# Patient Record
Sex: FEMALE | Race: WHITE | NOT HISPANIC OR LATINO | Employment: FULL TIME | URBAN - METROPOLITAN AREA
[De-identification: names, ages, dates, MRNs, and addresses within clinical notes are randomized per-mention and may not be internally consistent; named-entity substitution may affect disease eponyms.]

---

## 2017-02-17 ENCOUNTER — OFFICE VISIT (OUTPATIENT)
Dept: CARDIOLOGY | Facility: CLINIC | Age: 37
End: 2017-02-17

## 2017-02-17 VITALS
HEART RATE: 97 BPM | WEIGHT: 269.4 LBS | HEIGHT: 67 IN | BODY MASS INDEX: 42.28 KG/M2 | DIASTOLIC BLOOD PRESSURE: 92 MMHG | SYSTOLIC BLOOD PRESSURE: 141 MMHG

## 2017-02-17 DIAGNOSIS — R00.2 PALPITATIONS: Primary | ICD-10-CM

## 2017-02-17 PROCEDURE — 93225 XTRNL ECG REC<48 HRS REC: CPT | Performed by: INTERNAL MEDICINE

## 2017-02-17 PROCEDURE — 99213 OFFICE O/P EST LOW 20 MIN: CPT | Performed by: INTERNAL MEDICINE

## 2017-02-17 RX ORDER — ESOMEPRAZOLE MAGNESIUM 40 MG/1
40 CAPSULE, DELAYED RELEASE ORAL
COMMUNITY
End: 2019-10-16

## 2017-02-17 NOTE — PROGRESS NOTES
[facility] at [facility]  Office Progress Note  Brandy Garcia  1980  [phone]  [phone]    Visit Date: 02/17/2017    PCP: No Known Provider  [place] [id number]    IDENTIFICATION: A 36 y.o. female from [place].    Chief Complaint   Patient presents with   • Follow-up     SOA/PALPS       PROBLEM LIST:   1. Chest pain/palpitations   A. Echo 7/15: normal EF, normal pressures, IVC collapse consistent with decreased intravascular volume.  2. Obesity  3. GERD  4. H/o SVT   A. No interventions  5. Elevated TSH with history of postpartum hypothyroidism      Allergies  Allergies not on file    Current Medications    Current Outpatient Prescriptions:   •  esomeprazole (nexIUM) 40 MG capsule, Take 40 mg by mouth Every Morning Before Breakfast., Disp: , Rfl:       History of Present Illness   Patient is previously known to Dr. Jacobsen and was last seen in July 2015 while she was pregnant with chest discomfort and tachycardia palpitations.  He performed an echocardiogram which showed a collapsed inferior vena cava and he encouraged to increase fluid intake for which resolved her symptoms at that time.  Proximal by 2 months ago she began with fatigue, malaise, and recurrent panic attacks which led up to an emergency department visit to [place] [facility] where she is found to have an elevated TSH.  She was found to have sinus tachycardia with a heart rate of 129 bpm and was given IV fluids and had improvement of her symptoms at that time.  She was discharged home with recommended follow-up and further evaluation of her elevated TSH levels.    She states that over the last 2 weeks she has had a worsening sensation of tachycardia palpitations that are waking her up at night making her short of breath without dizziness or chest discomfort.  She has been able to perform routine activities and notes limitations to her activities of daily living due to fatigue.  She has discontinued  "her caffeine intake and has not seen any improvement.  She does not use any decongestants or OTC NSAIDs.  She also acknowledges uncontrolled weight fluctuations but overall has continuously gained weight over the last 6 months.  She denies orthopnea, PND, lower extremity edema.    ROS:  All systems have been reviewed and are negative with the exception of those mentioned in the HPI.    OBJECTIVE:  Vitals:    02/17/17 1250   BP: 141/92   BP Location: Left arm   Patient Position: Sitting   Pulse: 97   Weight: 269 lb 6.4 oz (122 kg)   Height: 67\" (170.2 cm)     Physical Exam   Constitutional: She is oriented to person, place, and time. She appears well-developed and well-nourished. No distress.   Neck: Normal range of motion. Neck supple. No hepatojugular reflux and no JVD present. Carotid bruit is not present. No tracheal deviation present. No thyromegaly present.   Cardiovascular: Normal rate, regular rhythm, S1 normal, S2 normal, intact distal pulses and normal pulses.  PMI is not displaced.  Exam reveals no gallop, no distant heart sounds, no friction rub, no midsystolic click and no opening snap.    No murmur heard.  Pulses:       Radial pulses are 2+ on the right side, and 2+ on the left side.        Dorsalis pedis pulses are 2+ on the right side, and 2+ on the left side.        Posterior tibial pulses are 2+ on the right side, and 2+ on the left side.   Pulmonary/Chest: Effort normal and breath sounds normal. She has no wheezes. She has no rales.   Abdominal: Soft. Bowel sounds are normal. She exhibits no mass. There is no tenderness. There is no guarding.   Musculoskeletal: Normal range of motion. She exhibits no edema or tenderness.   Neurological: She is alert and oriented to person, place, and time.   Nursing note and vitals reviewed.      Diagnostic Data:  Procedures      ASSESSMENT:   Diagnosis Plan   1. Palpitations  Holter Monitor - 24 Hour       PLAN:  1.  Normal echo in 2015.  Related to her " hyperthyroidism from recently found elevated TSH.  With her questionable history of supraventricular tachycardia will perform 24-hour Holter monitor just to ensure that this is sinus tachycardia related to her thyroid issues.  Will send a letter the results.  She has planned follow-up with a primary care provider to begin evaluation for this elevated TSH.    Follow-up with us in 9 months or sooner if needed.    Scribed for Severo Leija MD by RACHANA Rasheed. 2/17/2017  1:33 PM   I, Seveor Leija MD, personally performed the services described in this documentation as scribed by the above named individual in my presence, and it is both accurate and complete.  2/17/2017  1:43 PM        Severo Leija MD, [facility]

## 2017-02-28 PROCEDURE — 93227 XTRNL ECG REC<48 HR R&I: CPT | Performed by: INTERNAL MEDICINE

## 2017-03-02 ENCOUNTER — OFFICE VISIT (OUTPATIENT)
Dept: CARDIOLOGY | Facility: CLINIC | Age: 37
End: 2017-03-02

## 2017-03-02 DIAGNOSIS — R00.2 PALPITATIONS: ICD-10-CM

## 2017-03-08 ENCOUNTER — TELEPHONE (OUTPATIENT)
Dept: CARDIOLOGY | Facility: CLINIC | Age: 37
End: 2017-03-08

## 2017-03-08 NOTE — TELEPHONE ENCOUNTER
Patient called about holter monitor results. Informed patient her holter was normal. Patient stated she has still had an increased HR that wakes her at night. Asked patient if she had f/u with PCP for thyroid. Patient stated she had and they started her on synthroid and would recheck levels in 2 wks. Advised patient to see what her thyroid levels are are if PCP feels this isn't related to her thyroid to call us back and we can get her an appt or discuss with Dr Leija. Patient verbalized understanding.

## 2017-08-23 ENCOUNTER — TELEPHONE (OUTPATIENT)
Dept: CARDIOLOGY | Facility: CLINIC | Age: 37
End: 2017-08-23

## 2017-08-23 NOTE — TELEPHONE ENCOUNTER
Patient called stating she had an EKG with PCP and it was abnormal. Patient stated PCP told her she needs to be seen by cardio. She is currently scheduled in December for routine follow up. We have not received referral or EKG but this needs triaged. Please let us know if you would like us to move up the appointment.

## 2017-08-25 NOTE — TELEPHONE ENCOUNTER
Called patient and got PCP information.  I called [employer] [facility] at [phone] and requested the EKG at 10:50am today.  Waiting on the the fax.

## 2017-08-28 NOTE — TELEPHONE ENCOUNTER
Called again today 8/28/17 to have the EKG faxed to me at 10:20am. As of this time we had not received it.

## 2017-08-29 NOTE — TELEPHONE ENCOUNTER
Per Dr. Leija EKG looks good and to keep already scheduled appt. If she has anymore problems to give us a call.    LMOM at 2:58pm with Dr. Leija's recommendations.

## 2017-11-30 ENCOUNTER — TRANSCRIBE ORDERS (OUTPATIENT)
Dept: ADMINISTRATIVE | Facility: HOSPITAL | Age: 37
End: 2017-11-30

## 2017-12-04 ENCOUNTER — TRANSCRIBE ORDERS (OUTPATIENT)
Dept: ADMINISTRATIVE | Facility: HOSPITAL | Age: 37
End: 2017-12-04

## 2017-12-04 DIAGNOSIS — N63.11 BREAST LUMP ON RIGHT SIDE AT 10 O'CLOCK POSITION: ICD-10-CM

## 2017-12-04 DIAGNOSIS — N63.0 BREAST MASS IN FEMALE: Primary | ICD-10-CM

## 2017-12-20 ENCOUNTER — HOSPITAL ENCOUNTER (OUTPATIENT)
Dept: MAMMOGRAPHY | Facility: HOSPITAL | Age: 37
Discharge: HOME | End: 2017-12-20
Admitting: NURSE PRACTITIONER

## 2017-12-20 ENCOUNTER — HOSPITAL ENCOUNTER (OUTPATIENT)
Dept: ULTRASOUND IMAGING | Facility: HOSPITAL | Age: 37
Discharge: HOME | End: 2017-12-20

## 2017-12-20 DIAGNOSIS — N63.0 BREAST MASS IN FEMALE: ICD-10-CM

## 2017-12-20 DIAGNOSIS — N63.11 BREAST LUMP ON RIGHT SIDE AT 10 O'CLOCK POSITION: ICD-10-CM

## 2017-12-20 PROCEDURE — 76642 ULTRASOUND BREAST LIMITED: CPT | Performed by: RADIOLOGY

## 2017-12-20 PROCEDURE — G0279 TOMOSYNTHESIS, MAMMO: HCPCS

## 2017-12-20 PROCEDURE — 77066 DX MAMMO INCL CAD BI: CPT | Performed by: RADIOLOGY

## 2017-12-20 PROCEDURE — 76642 ULTRASOUND BREAST LIMITED: CPT

## 2017-12-20 PROCEDURE — 77062 BREAST TOMOSYNTHESIS BI: CPT | Performed by: RADIOLOGY

## 2017-12-20 PROCEDURE — G0204 DX MAMMO INCL CAD BI: HCPCS

## 2017-12-31 ENCOUNTER — HOSPITAL ENCOUNTER (EMERGENCY)
Facility: HOSPITAL | Age: 37
Discharge: HOME | End: 2017-12-31
Attending: EMERGENCY MEDICINE | Admitting: EMERGENCY MEDICINE

## 2017-12-31 ENCOUNTER — APPOINTMENT (OUTPATIENT)
Dept: GENERAL RADIOLOGY | Facility: HOSPITAL | Age: 37
End: 2017-12-31

## 2017-12-31 VITALS
RESPIRATION RATE: 20 BRPM | SYSTOLIC BLOOD PRESSURE: 129 MMHG | OXYGEN SATURATION: 98 % | HEART RATE: 98 BPM | WEIGHT: 247 LBS | TEMPERATURE: 99 F | BODY MASS INDEX: 38.77 KG/M2 | HEIGHT: 67 IN | DIASTOLIC BLOOD PRESSURE: 92 MMHG

## 2017-12-31 DIAGNOSIS — B34.9 VIRAL SYNDROME: Primary | ICD-10-CM

## 2017-12-31 DIAGNOSIS — J40 BRONCHITIS: ICD-10-CM

## 2017-12-31 LAB
FLUAV AG NPH QL: NEGATIVE
FLUBV AG NPH QL IA: NEGATIVE

## 2017-12-31 PROCEDURE — 94760 N-INVAS EAR/PLS OXIMETRY 1: CPT

## 2017-12-31 PROCEDURE — 99284 EMERGENCY DEPT VISIT MOD MDM: CPT

## 2017-12-31 PROCEDURE — 87804 INFLUENZA ASSAY W/OPTIC: CPT | Performed by: EMERGENCY MEDICINE

## 2017-12-31 PROCEDURE — 94640 AIRWAY INHALATION TREATMENT: CPT

## 2017-12-31 PROCEDURE — 71020 HC CHEST PA AND LATERAL: CPT

## 2017-12-31 RX ORDER — IPRATROPIUM BROMIDE AND ALBUTEROL SULFATE 2.5; .5 MG/3ML; MG/3ML
3 SOLUTION RESPIRATORY (INHALATION) ONCE
Status: COMPLETED | OUTPATIENT
Start: 2017-12-31 | End: 2017-12-31

## 2017-12-31 RX ORDER — GUAIFENESIN AND CODEINE PHOSPHATE 100; 10 MG/5ML; MG/5ML
5 SOLUTION ORAL 4 TIMES DAILY PRN
Qty: 180 ML | Refills: 0 | Status: SHIPPED | OUTPATIENT
Start: 2017-12-31 | End: 2018-01-05

## 2017-12-31 RX ADMIN — HYDROCODONE POLISTIREX AND CHLORPHENIRAMINE POLISTIREX 5 ML: 10; 8 SUSPENSION, EXTENDED RELEASE ORAL at 13:20

## 2017-12-31 RX ADMIN — IPRATROPIUM BROMIDE AND ALBUTEROL SULFATE 3 ML: .5; 3 SOLUTION RESPIRATORY (INHALATION) at 12:34

## 2018-01-01 ENCOUNTER — HOSPITAL ENCOUNTER (EMERGENCY)
Facility: HOSPITAL | Age: 38
Discharge: HOME | End: 2018-01-01
Attending: EMERGENCY MEDICINE | Admitting: EMERGENCY MEDICINE

## 2018-01-01 VITALS
BODY MASS INDEX: 38.77 KG/M2 | SYSTOLIC BLOOD PRESSURE: 116 MMHG | RESPIRATION RATE: 16 BRPM | DIASTOLIC BLOOD PRESSURE: 80 MMHG | OXYGEN SATURATION: 98 % | WEIGHT: 247 LBS | TEMPERATURE: 100 F | HEART RATE: 112 BPM | HEIGHT: 67 IN

## 2018-01-01 DIAGNOSIS — J11.1 INFLUENZA: Primary | ICD-10-CM

## 2018-01-01 DIAGNOSIS — R68.89 FLU-LIKE SYMPTOMS: ICD-10-CM

## 2018-01-01 LAB
ALBUMIN SERPL-MCNC: 4.1 G/DL (ref 3.2–4.8)
ALBUMIN/GLOB SERPL: 1.2 G/DL (ref 1.5–2.5)
ALP SERPL-CCNC: 79 U/L (ref 25–100)
ALT SERPL W P-5'-P-CCNC: 46 U/L (ref 7–40)
ANION GAP SERPL CALCULATED.3IONS-SCNC: 8 MMOL/L (ref 3–11)
AST SERPL-CCNC: 26 U/L (ref 0–33)
BASOPHILS # BLD AUTO: 0.01 10*3/MM3 (ref 0–0.2)
BASOPHILS NFR BLD AUTO: 0.2 % (ref 0–1)
BILIRUB SERPL-MCNC: 0.3 MG/DL (ref 0.3–1.2)
BUN BLD-MCNC: 10 MG/DL (ref 9–23)
BUN/CREAT SERPL: 14.3 (ref 7–25)
CALCIUM SPEC-SCNC: 8.6 MG/DL (ref 8.7–10.4)
CHLORIDE SERPL-SCNC: 106 MMOL/L (ref 99–109)
CO2 SERPL-SCNC: 22 MMOL/L (ref 20–31)
CREAT BLD-MCNC: 0.7 MG/DL (ref 0.6–1.3)
DEPRECATED RDW RBC AUTO: 42.2 FL (ref 37–54)
EOSINOPHIL # BLD AUTO: 0.11 10*3/MM3 (ref 0–0.3)
EOSINOPHIL NFR BLD AUTO: 2.2 % (ref 0–3)
ERYTHROCYTE [DISTWIDTH] IN BLOOD BY AUTOMATED COUNT: 13.4 % (ref 11.3–14.5)
GFR SERPL CREATININE-BSD FRML MDRD: 95 ML/MIN/1.73
GLOBULIN UR ELPH-MCNC: 3.4 GM/DL
GLUCOSE BLD-MCNC: 110 MG/DL (ref 70–100)
HCT VFR BLD AUTO: 37.6 % (ref 34.5–44)
HGB BLD-MCNC: 12.3 G/DL (ref 11.5–15.5)
IMM GRANULOCYTES # BLD: 0.02 10*3/MM3 (ref 0–0.03)
IMM GRANULOCYTES NFR BLD: 0.4 % (ref 0–0.6)
LYMPHOCYTES # BLD AUTO: 0.82 10*3/MM3 (ref 0.6–4.8)
LYMPHOCYTES NFR BLD AUTO: 16.4 % (ref 24–44)
MCH RBC QN AUTO: 28.1 PG (ref 27–31)
MCHC RBC AUTO-ENTMCNC: 32.7 G/DL (ref 32–36)
MCV RBC AUTO: 86 FL (ref 80–99)
MONOCYTES # BLD AUTO: 0.65 10*3/MM3 (ref 0–1)
MONOCYTES NFR BLD AUTO: 13 % (ref 0–12)
NEUTROPHILS # BLD AUTO: 3.39 10*3/MM3 (ref 1.5–8.3)
NEUTROPHILS NFR BLD AUTO: 67.8 % (ref 41–71)
PLATELET # BLD AUTO: 274 10*3/MM3 (ref 150–450)
PMV BLD AUTO: 10.4 FL (ref 6–12)
POTASSIUM BLD-SCNC: 3.8 MMOL/L (ref 3.5–5.5)
PROT SERPL-MCNC: 7.5 G/DL (ref 5.7–8.2)
RBC # BLD AUTO: 4.37 10*6/MM3 (ref 3.89–5.14)
SODIUM BLD-SCNC: 136 MMOL/L (ref 132–146)
WBC NRBC COR # BLD: 5 10*3/MM3 (ref 3.5–10.8)

## 2018-01-01 PROCEDURE — 94760 N-INVAS EAR/PLS OXIMETRY 1: CPT

## 2018-01-01 PROCEDURE — 80053 COMPREHEN METABOLIC PANEL: CPT | Performed by: PHYSICIAN ASSISTANT

## 2018-01-01 PROCEDURE — 96361 HYDRATE IV INFUSION ADD-ON: CPT

## 2018-01-01 PROCEDURE — 85025 COMPLETE CBC W/AUTO DIFF WBC: CPT | Performed by: PHYSICIAN ASSISTANT

## 2018-01-01 PROCEDURE — 25010000002 KETOROLAC TROMETHAMINE PER 15 MG: Performed by: PHYSICIAN ASSISTANT

## 2018-01-01 PROCEDURE — 25010000002 ONDANSETRON PER 1 MG: Performed by: PHYSICIAN ASSISTANT

## 2018-01-01 PROCEDURE — 94640 AIRWAY INHALATION TREATMENT: CPT

## 2018-01-01 PROCEDURE — 99284 EMERGENCY DEPT VISIT MOD MDM: CPT

## 2018-01-01 PROCEDURE — 96375 TX/PRO/DX INJ NEW DRUG ADDON: CPT

## 2018-01-01 PROCEDURE — 96374 THER/PROPH/DIAG INJ IV PUSH: CPT

## 2018-01-01 RX ORDER — SODIUM CHLORIDE 0.9 % (FLUSH) 0.9 %
10 SYRINGE (ML) INJECTION AS NEEDED
Status: DISCONTINUED | OUTPATIENT
Start: 2018-01-01 | End: 2018-01-01 | Stop reason: HOSPADM

## 2018-01-01 RX ORDER — ALBUTEROL SULFATE 2.5 MG/3ML
2.5 SOLUTION RESPIRATORY (INHALATION) ONCE
Status: COMPLETED | OUTPATIENT
Start: 2018-01-01 | End: 2018-01-01

## 2018-01-01 RX ORDER — KETOROLAC TROMETHAMINE 30 MG/ML
15 INJECTION, SOLUTION INTRAMUSCULAR; INTRAVENOUS ONCE
Status: COMPLETED | OUTPATIENT
Start: 2018-01-01 | End: 2018-01-01

## 2018-01-01 RX ORDER — ONDANSETRON 2 MG/ML
4 INJECTION INTRAMUSCULAR; INTRAVENOUS ONCE
Status: COMPLETED | OUTPATIENT
Start: 2018-01-01 | End: 2018-01-01

## 2018-01-01 RX ORDER — ALBUTEROL SULFATE 2.5 MG/3ML
SOLUTION RESPIRATORY (INHALATION)
Status: COMPLETED
Start: 2018-01-01 | End: 2018-01-01

## 2018-01-01 RX ADMIN — SODIUM CHLORIDE 2000 ML: 9 INJECTION, SOLUTION INTRAVENOUS at 14:11

## 2018-01-01 RX ADMIN — ALBUTEROL SULFATE 2.5 MG: 2.5 SOLUTION RESPIRATORY (INHALATION) at 14:30

## 2018-01-01 RX ADMIN — ALBUTEROL SULFATE 2.5 MG: 2.5 SOLUTION RESPIRATORY (INHALATION) at 14:31

## 2018-01-01 RX ADMIN — KETOROLAC TROMETHAMINE 15 MG: 30 INJECTION, SOLUTION INTRAMUSCULAR at 14:47

## 2018-01-01 RX ADMIN — ONDANSETRON 4 MG: 2 INJECTION INTRAMUSCULAR; INTRAVENOUS at 14:47

## 2018-01-01 NOTE — ED PROVIDER NOTES
Subjective   HPI Comments: 37-year-old female presents to the emergency department with flulike symptoms.  Specifically, she complains of fever, body aches, headache, nonproductive cough and generalized fatigue.  The patient states her symptoms began about 3 days ago.  She was seen here yesterday and had a negative flu screen and negative chest x-ray.  She received a neb treatment which helped briefly.  Past medical history of hypothyroidism, anxiety and depression.  She is a nonsmoker.  No alcohol or drug use.  Her PCP is Dr. Solano in [place].    Patient is a 37 y.o. female presenting with flu symptoms.   History provided by:  Patient  Flu Symptoms   Presenting symptoms: cough, fatigue, fever, headache, myalgias, nausea and sore throat    Presenting symptoms: no diarrhea, no rhinorrhea, no shortness of breath and no vomiting    Severity:  Moderate  Onset quality:  Gradual  Duration:  3 days  Progression:  Worsening  Chronicity:  New  Relieved by:  Nothing  Worsened by:  Nothing  Ineffective treatments:  OTC medications  Associated symptoms: chills, decreased appetite and nasal congestion    Associated symptoms: no ear pain        Review of Systems   Constitutional: Positive for chills, decreased appetite, fatigue and fever.   HENT: Positive for congestion and sore throat. Negative for ear pain, nosebleeds and rhinorrhea.    Eyes: Negative for pain, discharge and visual disturbance.   Respiratory: Positive for cough. Negative for shortness of breath and wheezing.    Cardiovascular: Negative for chest pain, palpitations and leg swelling.   Gastrointestinal: Positive for nausea. Negative for abdominal pain, blood in stool, diarrhea and vomiting.   Endocrine: Negative.    Genitourinary: Negative for dysuria, hematuria and urgency.   Musculoskeletal: Positive for myalgias. Negative for arthralgias and back pain.   Skin: Negative for pallor and rash.   Allergic/Immunologic: Negative for immunocompromised state.    Neurological: Positive for weakness (generalized) and headaches. Negative for dizziness and speech difficulty.   Hematological: Negative for adenopathy. Does not bruise/bleed easily.   Psychiatric/Behavioral: Negative.        History reviewed. No pertinent past medical history.    Allergies   Allergen Reactions   • Brethaire [Terbutaline]    • Prednisone Other (See Comments)   • Zithromax [Azithromycin] GI Intolerance   • Amoxicillin Rash       History reviewed. No pertinent surgical history.    History reviewed. No pertinent family history.    Social History     Social History   • Marital status:      Spouse name: N/A   • Number of children: N/A   • Years of education: N/A     Social History Main Topics   • Smoking status: Former Smoker     Quit date: 2/17/2013   • Smokeless tobacco: None   • Alcohol use 1.8 - 3.6 oz/week     1 - 2 Glasses of wine, 1 - 2 Cans of beer, 1 - 2 Shots of liquor per week      Comment: OCCAS   • Drug use: No   • Sexual activity: Defer     Other Topics Concern   • None     Social History Narrative           Objective   Physical Exam   Constitutional: She is oriented to person, place, and time. She appears well-developed and well-nourished.   Appears to feel bad     HENT:   Head: Normocephalic and atraumatic.   Nose: Nose normal.   Mouth/Throat: Oropharynx is clear and moist.   Eyes: EOM are normal. Pupils are equal, round, and reactive to light. Left eye exhibits no discharge. No scleral icterus.   Neck: Normal range of motion. Neck supple.   Cardiovascular: Regular rhythm and normal heart sounds.    No murmur heard.  Tachycardic at 114   Pulmonary/Chest: Effort normal. No respiratory distress. She has wheezes (mild). She has no rales. She exhibits no tenderness.   Abdominal: Soft. Bowel sounds are normal. There is no tenderness.   Musculoskeletal: Normal range of motion. She exhibits no edema or tenderness.   Neurological: She is alert and oriented to person, place, and time.    Skin: Skin is warm and dry. No rash noted. She is not diaphoretic.   Psychiatric: She has a normal mood and affect.   Nursing note and vitals reviewed.      Procedures         ED Course  ED Course    Nml white count.  Nml chemistries.  Pt has received Toradol and two liters of saline.  She had negative flu screen yesterday but I suspect that is a false negative.  At any rate, she is outside the window for Tamiflu.  Will d/c home to continue fluids and Tylenol or Motrin.  She has a cough med with codeine that was prescribed earlier.    Recent Results (from the past 24 hour(s))   Comprehensive Metabolic Panel    Collection Time: 01/01/18  2:10 PM   Result Value Ref Range    Glucose 110 (H) 70 - 100 mg/dL    BUN 10 9 - 23 mg/dL    Creatinine 0.70 0.60 - 1.30 mg/dL    Sodium 136 132 - 146 mmol/L    Potassium 3.8 3.5 - 5.5 mmol/L    Chloride 106 99 - 109 mmol/L    CO2 22.0 20.0 - 31.0 mmol/L    Calcium 8.6 (L) 8.7 - 10.4 mg/dL    Total Protein 7.5 5.7 - 8.2 g/dL    Albumin 4.10 3.20 - 4.80 g/dL    ALT (SGPT) 46 (H) 7 - 40 U/L    AST (SGOT) 26 0 - 33 U/L    Alkaline Phosphatase 79 25 - 100 U/L    Total Bilirubin 0.3 0.3 - 1.2 mg/dL    eGFR Non African Amer 95 >60 mL/min/1.73    Globulin 3.4 gm/dL    A/G Ratio 1.2 (L) 1.5 - 2.5 g/dL    BUN/Creatinine Ratio 14.3 7.0 - 25.0    Anion Gap 8.0 3.0 - 11.0 mmol/L   CBC Auto Differential    Collection Time: 01/01/18  2:13 PM   Result Value Ref Range    WBC 5.00 3.50 - 10.80 10*3/mm3    RBC 4.37 3.89 - 5.14 10*6/mm3    Hemoglobin 12.3 11.5 - 15.5 g/dL    Hematocrit 37.6 34.5 - 44.0 %    MCV 86.0 80.0 - 99.0 fL    MCH 28.1 27.0 - 31.0 pg    MCHC 32.7 32.0 - 36.0 g/dL    RDW 13.4 11.3 - 14.5 %    RDW-SD 42.2 37.0 - 54.0 fl    MPV 10.4 6.0 - 12.0 fL    Platelets 274 150 - 450 10*3/mm3    Neutrophil % 67.8 41.0 - 71.0 %    Lymphocyte % 16.4 (L) 24.0 - 44.0 %    Monocyte % 13.0 (H) 0.0 - 12.0 %    Eosinophil % 2.2 0.0 - 3.0 %    Basophil % 0.2 0.0 - 1.0 %    Immature Grans % 0.4 0.0 -  "0.6 %    Neutrophils, Absolute 3.39 1.50 - 8.30 10*3/mm3    Lymphocytes, Absolute 0.82 0.60 - 4.80 10*3/mm3    Monocytes, Absolute 0.65 0.00 - 1.00 10*3/mm3    Eosinophils, Absolute 0.11 0.00 - 0.30 10*3/mm3    Basophils, Absolute 0.01 0.00 - 0.20 10*3/mm3    Immature Grans, Absolute 0.02 0.00 - 0.03 10*3/mm3     Note: In addition to lab results from this visit, the labs listed above may include labs taken at another facility or during a different encounter within the last 24 hours. Please correlate lab times with ED admission and discharge times for further clarification of the services performed during this visit.    No orders to display     Vitals:    01/01/18 1318 01/01/18 1431   BP: 158/97    BP Location: Left arm    Patient Position: Sitting    Pulse: 112 98   Resp: 20 16   Temp: 100 °F (37.8 °C)    TempSrc: Oral    SpO2: 97% 97%   Weight: 112 kg (247 lb)    Height: 170.2 cm (67\")      Medications   sodium chloride 0.9 % flush 10 mL (not administered)   sodium chloride 0.9 % bolus 2,000 mL (2,000 mL Intravenous New Bag 1/1/18 1411)   ketorolac (TORADOL) injection 15 mg (15 mg Intravenous Given 1/1/18 1447)   ondansetron (ZOFRAN) injection 4 mg (4 mg Intravenous Given 1/1/18 1447)   albuterol (PROVENTIL) nebulizer solution 0.083% 2.5 mg/3mL (2.5 mg Nebulization Given 1/1/18 1431)   albuterol (PROVENTIL) (2.5 MG/3ML) 0.083% nebulizer solution  - ADS Override Pull (2.5 mg  Given 1/1/18 1430)     ECG/EMG Results (last 24 hours)     ** No results found for the last 24 hours. **                      [facility]    Final diagnoses:   Influenza   Flu-like symptoms            RACHANA Kern  01/01/18 1539    "

## 2018-01-01 NOTE — DISCHARGE INSTRUCTIONS
Plenty of fluids.  Rest.  Tylenol or Motrin as directed for fever and body aches.  Use your previously prescribed cough med.  Follow up with your PCP or return to ER if worse.

## 2018-03-12 ENCOUNTER — TRANSCRIBE ORDERS (OUTPATIENT)
Dept: LAB | Facility: HOSPITAL | Age: 38
End: 2018-03-12

## 2018-03-12 ENCOUNTER — APPOINTMENT (OUTPATIENT)
Dept: LAB | Facility: HOSPITAL | Age: 38
End: 2018-03-12

## 2018-03-12 DIAGNOSIS — E03.9 HYPOTHYROIDISM, UNSPECIFIED TYPE: Primary | ICD-10-CM

## 2018-08-08 ENCOUNTER — HOSPITAL ENCOUNTER (EMERGENCY)
Facility: HOSPITAL | Age: 38
Discharge: HOME | End: 2018-08-08
Attending: EMERGENCY MEDICINE | Admitting: EMERGENCY MEDICINE

## 2018-08-08 ENCOUNTER — APPOINTMENT (OUTPATIENT)
Dept: CT IMAGING | Facility: HOSPITAL | Age: 38
End: 2018-08-08

## 2018-08-08 VITALS
BODY MASS INDEX: 38.3 KG/M2 | WEIGHT: 244 LBS | RESPIRATION RATE: 14 BRPM | HEART RATE: 75 BPM | DIASTOLIC BLOOD PRESSURE: 94 MMHG | HEIGHT: 67 IN | OXYGEN SATURATION: 95 % | TEMPERATURE: 97.7 F | SYSTOLIC BLOOD PRESSURE: 138 MMHG

## 2018-08-08 DIAGNOSIS — N20.1 URETEROLITHIASIS: Primary | ICD-10-CM

## 2018-08-08 DIAGNOSIS — N23 RENAL COLIC ON LEFT SIDE: ICD-10-CM

## 2018-08-08 LAB
ALBUMIN SERPL-MCNC: 4.4 G/DL (ref 3.2–4.8)
ALBUMIN/GLOB SERPL: 1.3 G/DL (ref 1.5–2.5)
ALP SERPL-CCNC: 73 U/L (ref 25–100)
ALT SERPL W P-5'-P-CCNC: 52 U/L (ref 7–40)
ANION GAP SERPL CALCULATED.3IONS-SCNC: 18 MMOL/L (ref 3–11)
AST SERPL-CCNC: 26 U/L (ref 0–33)
BACTERIA UR QL AUTO: NORMAL /HPF
BASOPHILS # BLD AUTO: 0.04 10*3/MM3 (ref 0–0.2)
BASOPHILS NFR BLD AUTO: 0.5 % (ref 0–1)
BILIRUB SERPL-MCNC: 0.4 MG/DL (ref 0.3–1.2)
BILIRUB UR QL STRIP: NEGATIVE
BUN BLD-MCNC: 13 MG/DL (ref 9–23)
BUN/CREAT SERPL: 14.9 (ref 7–25)
CALCIUM SPEC-SCNC: 9.2 MG/DL (ref 8.7–10.4)
CHLORIDE SERPL-SCNC: 106 MMOL/L (ref 99–109)
CLARITY UR: CLEAR
CO2 SERPL-SCNC: 23 MMOL/L (ref 20–31)
COLOR UR: ABNORMAL
CREAT BLD-MCNC: 0.87 MG/DL (ref 0.6–1.3)
DEPRECATED RDW RBC AUTO: 41.7 FL (ref 37–54)
EOSINOPHIL # BLD AUTO: 0.36 10*3/MM3 (ref 0–0.3)
EOSINOPHIL NFR BLD AUTO: 4.6 % (ref 0–3)
ERYTHROCYTE [DISTWIDTH] IN BLOOD BY AUTOMATED COUNT: 13 % (ref 11.3–14.5)
GFR SERPL CREATININE-BSD FRML MDRD: 73 ML/MIN/1.73
GLOBULIN UR ELPH-MCNC: 3.3 GM/DL
GLUCOSE BLD-MCNC: 105 MG/DL (ref 70–100)
GLUCOSE UR STRIP-MCNC: NEGATIVE MG/DL
HCT VFR BLD AUTO: 39.5 % (ref 34.5–44)
HGB BLD-MCNC: 12.9 G/DL (ref 11.5–15.5)
HGB UR QL STRIP.AUTO: NEGATIVE
HOLD SPECIMEN: NORMAL
HOLD SPECIMEN: NORMAL
HYALINE CASTS UR QL AUTO: NORMAL /LPF
IMM GRANULOCYTES # BLD: 0.02 10*3/MM3 (ref 0–0.03)
IMM GRANULOCYTES NFR BLD: 0.3 % (ref 0–0.6)
KETONES UR QL STRIP: NEGATIVE
LEUKOCYTE ESTERASE UR QL STRIP.AUTO: ABNORMAL
LIPASE SERPL-CCNC: 53 U/L (ref 6–51)
LYMPHOCYTES # BLD AUTO: 2.3 10*3/MM3 (ref 0.6–4.8)
LYMPHOCYTES NFR BLD AUTO: 29.4 % (ref 24–44)
MCH RBC QN AUTO: 28.5 PG (ref 27–31)
MCHC RBC AUTO-ENTMCNC: 32.7 G/DL (ref 32–36)
MCV RBC AUTO: 87.2 FL (ref 80–99)
MONOCYTES # BLD AUTO: 0.56 10*3/MM3 (ref 0–1)
MONOCYTES NFR BLD AUTO: 7.2 % (ref 0–12)
NEUTROPHILS # BLD AUTO: 4.55 10*3/MM3 (ref 1.5–8.3)
NEUTROPHILS NFR BLD AUTO: 58 % (ref 41–71)
NITRITE UR QL STRIP: POSITIVE
PH UR STRIP.AUTO: 5.5 [PH] (ref 5–8)
PLATELET # BLD AUTO: 339 10*3/MM3 (ref 150–450)
PMV BLD AUTO: 10.2 FL (ref 6–12)
POTASSIUM BLD-SCNC: 4.1 MMOL/L (ref 3.5–5.5)
PROT SERPL-MCNC: 7.7 G/DL (ref 5.7–8.2)
PROT UR QL STRIP: NEGATIVE
RBC # BLD AUTO: 4.53 10*6/MM3 (ref 3.89–5.14)
RBC # UR: NORMAL /HPF
REF LAB TEST METHOD: NORMAL
SODIUM BLD-SCNC: 147 MMOL/L (ref 132–146)
SP GR UR STRIP: 1.01 (ref 1–1.03)
SQUAMOUS #/AREA URNS HPF: NORMAL /HPF
UROBILINOGEN UR QL STRIP: ABNORMAL
WBC NRBC COR # BLD: 7.83 10*3/MM3 (ref 3.5–10.8)
WBC UR QL AUTO: NORMAL /HPF
WHOLE BLOOD HOLD SPECIMEN: NORMAL
WHOLE BLOOD HOLD SPECIMEN: NORMAL

## 2018-08-08 PROCEDURE — 74176 CT ABD & PELVIS W/O CONTRAST: CPT

## 2018-08-08 PROCEDURE — 96375 TX/PRO/DX INJ NEW DRUG ADDON: CPT

## 2018-08-08 PROCEDURE — 25010000002 MORPHINE PER 10 MG: Performed by: EMERGENCY MEDICINE

## 2018-08-08 PROCEDURE — 96374 THER/PROPH/DIAG INJ IV PUSH: CPT

## 2018-08-08 PROCEDURE — 80053 COMPREHEN METABOLIC PANEL: CPT | Performed by: EMERGENCY MEDICINE

## 2018-08-08 PROCEDURE — 85025 COMPLETE CBC W/AUTO DIFF WBC: CPT | Performed by: EMERGENCY MEDICINE

## 2018-08-08 PROCEDURE — 25010000002 ONDANSETRON PER 1 MG: Performed by: EMERGENCY MEDICINE

## 2018-08-08 PROCEDURE — 81001 URINALYSIS AUTO W/SCOPE: CPT | Performed by: EMERGENCY MEDICINE

## 2018-08-08 PROCEDURE — 25010000002 KETOROLAC TROMETHAMINE PER 15 MG: Performed by: EMERGENCY MEDICINE

## 2018-08-08 PROCEDURE — 99284 EMERGENCY DEPT VISIT MOD MDM: CPT

## 2018-08-08 PROCEDURE — 83690 ASSAY OF LIPASE: CPT | Performed by: EMERGENCY MEDICINE

## 2018-08-08 RX ORDER — KETOROLAC TROMETHAMINE 10 MG/1
10 TABLET, FILM COATED ORAL EVERY 6 HOURS PRN
Qty: 20 TABLET | Refills: 0 | OUTPATIENT
Start: 2018-08-08 | End: 2018-11-20

## 2018-08-08 RX ORDER — CHLORAL HYDRATE 500 MG
550 CAPSULE ORAL
COMMUNITY
End: 2019-10-16

## 2018-08-08 RX ORDER — ONDANSETRON 8 MG/1
8 TABLET, ORALLY DISINTEGRATING ORAL EVERY 8 HOURS PRN
Qty: 15 TABLET | Refills: 0 | OUTPATIENT
Start: 2018-08-08 | End: 2018-11-20

## 2018-08-08 RX ORDER — OXYCODONE AND ACETAMINOPHEN 7.5; 325 MG/1; MG/1
1 TABLET ORAL EVERY 4 HOURS PRN
Qty: 15 TABLET | Refills: 0 | OUTPATIENT
Start: 2018-08-08 | End: 2018-11-20

## 2018-08-08 RX ORDER — SODIUM CHLORIDE 0.9 % (FLUSH) 0.9 %
10 SYRINGE (ML) INJECTION AS NEEDED
Status: DISCONTINUED | OUTPATIENT
Start: 2018-08-08 | End: 2018-08-08 | Stop reason: HOSPADM

## 2018-08-08 RX ORDER — ONDANSETRON 2 MG/ML
4 INJECTION INTRAMUSCULAR; INTRAVENOUS ONCE
Status: COMPLETED | OUTPATIENT
Start: 2018-08-08 | End: 2018-08-08

## 2018-08-08 RX ORDER — PHENAZOPYRIDINE HYDROCHLORIDE 100 MG/1
200 TABLET, FILM COATED ORAL ONCE
Status: COMPLETED | OUTPATIENT
Start: 2018-08-08 | End: 2018-08-08

## 2018-08-08 RX ORDER — OXYCODONE HYDROCHLORIDE AND ACETAMINOPHEN 5; 325 MG/1; MG/1
1 TABLET ORAL ONCE
Status: COMPLETED | OUTPATIENT
Start: 2018-08-08 | End: 2018-08-08

## 2018-08-08 RX ORDER — MORPHINE SULFATE 4 MG/ML
4 INJECTION, SOLUTION INTRAMUSCULAR; INTRAVENOUS ONCE
Status: COMPLETED | OUTPATIENT
Start: 2018-08-08 | End: 2018-08-08

## 2018-08-08 RX ORDER — KETOROLAC TROMETHAMINE 15 MG/ML
10 INJECTION, SOLUTION INTRAMUSCULAR; INTRAVENOUS ONCE
Status: COMPLETED | OUTPATIENT
Start: 2018-08-08 | End: 2018-08-08

## 2018-08-08 RX ORDER — OXYCODONE HYDROCHLORIDE AND ACETAMINOPHEN 5; 325 MG/1; MG/1
1 TABLET ORAL ONCE
Status: DISCONTINUED | OUTPATIENT
Start: 2018-08-08 | End: 2018-08-08

## 2018-08-08 RX ADMIN — KETOROLAC TROMETHAMINE 10 MG: 15 INJECTION, SOLUTION INTRAMUSCULAR; INTRAVENOUS at 19:15

## 2018-08-08 RX ADMIN — LIDOCAINE HYDROCHLORIDE 150 MG: 10 INJECTION, SOLUTION INFILTRATION; PERINEURAL at 19:31

## 2018-08-08 RX ADMIN — PHENAZOPYRIDINE HYDROCHLORIDE 200 MG: 100 TABLET ORAL at 19:15

## 2018-08-08 RX ADMIN — SODIUM CHLORIDE 1000 ML: 9 INJECTION, SOLUTION INTRAVENOUS at 19:15

## 2018-08-08 RX ADMIN — ONDANSETRON 4 MG: 2 INJECTION INTRAMUSCULAR; INTRAVENOUS at 19:15

## 2018-08-08 RX ADMIN — OXYCODONE HYDROCHLORIDE AND ACETAMINOPHEN 1 TABLET: 5; 325 TABLET ORAL at 20:31

## 2018-08-08 RX ADMIN — MORPHINE SULFATE 4 MG: 4 INJECTION, SOLUTION INTRAMUSCULAR; INTRAVENOUS at 20:09

## 2018-08-08 NOTE — ED PROVIDER NOTES
Subjective   Brandy Garcia is a 38 y.o.female who presents to the ED with complaints of left sided flank pain. The patient reports her pain started in her left groin and radiated into her left flank and LLQ earlier today. She describes the pain as a stabbing sensation that waxes and wanes. She has not taken any medication. She also complains of nausea and lightheadedness. She denies any history of kidney stones. There are no other complaints at this time.         History provided by:  Patient, spouse and relative  Flank Pain   Pain location:  L flank  Pain quality: stabbing    Pain radiates to:  LLQ  Pain severity:  Moderate  Onset quality:  Sudden  Duration: earlier today.  Timing:  Constant  Progression:  Waxing and waning  Chronicity:  New  Relieved by:  None tried  Worsened by:  Nothing  Ineffective treatments:  None tried  Associated symptoms: nausea        Review of Systems   Gastrointestinal: Positive for abdominal pain (LLQ) and nausea.   Genitourinary: Positive for flank pain.   Neurological: Positive for light-headedness.   All other systems reviewed and are negative.      Past Medical History:   Diagnosis Date   • Anxiety    • Depression    • Disease of thyroid gland    • GERD (gastroesophageal reflux disease)        Allergies   Allergen Reactions   • Prednisone Other (See Comments)     Makes her crazy    • Brethaire [Terbutaline]    • Zithromax [Azithromycin] GI Intolerance   • Amoxicillin Rash       Past Surgical History:   Procedure Laterality Date   • CHOLECYSTECTOMY     • LEEP         History reviewed. No pertinent family history.    Social History     Social History   • Marital status:      Social History Main Topics   • Smoking status: Former Smoker     Quit date: 2/17/2013   • Alcohol use 1.8 - 3.6 oz/week     1 - 2 Glasses of wine, 1 - 2 Cans of beer, 1 - 2 Shots of liquor per week      Comment: OCCAS   • Drug use: No   • Sexual activity: Defer     Other Topics Concern   • Not on file          Objective   Physical Exam   Constitutional: She is oriented to person, place, and time. She appears well-developed and well-nourished. No distress.   Pt appears uncomfortable   Cardiovascular: Normal rate, regular rhythm, normal heart sounds and intact distal pulses.    No murmur heard.  Pulmonary/Chest: Effort normal and breath sounds normal. No respiratory distress.   Abdominal: Soft. Bowel sounds are normal. There is no tenderness.   Musculoskeletal: Normal range of motion. She exhibits no edema.   No CVA TTP   Neurological: She is alert and oriented to person, place, and time.   Skin: Skin is warm and dry.   Psychiatric: She has a normal mood and affect. Her behavior is normal.   Nursing note and vitals reviewed.      Procedures         ED Course  ED Course as of Aug 08 2051   Wed Aug 08, 2018   2007 Symptoms improved.  Patient has a 4 mm distal left ureteral stone.  We'll discharge her symptomatically management follow-up with urology if symptoms persist return the ER for any worsening or concerns.  Patient voices understanding and agrees.    YOANA query complete. Treatment plan to include limited course of prescribed  controlled substance. Risks including addiction, benefits, and alternatives presented to patient.    [RS]      ED Course User Index  [RS] Killian Deluca MD     Recent Results (from the past 24 hour(s))   Comprehensive Metabolic Panel    Collection Time: 08/08/18  6:56 PM   Result Value Ref Range    Glucose 105 (H) 70 - 100 mg/dL    BUN 13 9 - 23 mg/dL    Creatinine 0.87 0.60 - 1.30 mg/dL    Sodium 147 (H) 132 - 146 mmol/L    Potassium 4.1 3.5 - 5.5 mmol/L    Chloride 106 99 - 109 mmol/L    CO2 23.0 20.0 - 31.0 mmol/L    Calcium 9.2 8.7 - 10.4 mg/dL    Total Protein 7.7 5.7 - 8.2 g/dL    Albumin 4.40 3.20 - 4.80 g/dL    ALT (SGPT) 52 (H) 7 - 40 U/L    AST (SGOT) 26 0 - 33 U/L    Alkaline Phosphatase 73 25 - 100 U/L    Total Bilirubin 0.4 0.3 - 1.2 mg/dL    eGFR Non African Amer 73  >60 mL/min/1.73    Globulin 3.3 gm/dL    A/G Ratio 1.3 (L) 1.5 - 2.5 g/dL    BUN/Creatinine Ratio 14.9 7.0 - 25.0    Anion Gap 18.0 (H) 3.0 - 11.0 mmol/L   Lipase    Collection Time: 08/08/18  6:56 PM   Result Value Ref Range    Lipase 53 (H) 6 - 51 U/L   Light Blue Top    Collection Time: 08/08/18  6:56 PM   Result Value Ref Range    Extra Tube hold for add-on    Green Top (Gel)    Collection Time: 08/08/18  6:56 PM   Result Value Ref Range    Extra Tube Hold for add-ons.    Lavender Top    Collection Time: 08/08/18  6:56 PM   Result Value Ref Range    Extra Tube hold for add-on    Gold Top - SST    Collection Time: 08/08/18  6:56 PM   Result Value Ref Range    Extra Tube Hold for add-ons.    CBC Auto Differential    Collection Time: 08/08/18  6:56 PM   Result Value Ref Range    WBC 7.83 3.50 - 10.80 10*3/mm3    RBC 4.53 3.89 - 5.14 10*6/mm3    Hemoglobin 12.9 11.5 - 15.5 g/dL    Hematocrit 39.5 34.5 - 44.0 %    MCV 87.2 80.0 - 99.0 fL    MCH 28.5 27.0 - 31.0 pg    MCHC 32.7 32.0 - 36.0 g/dL    RDW 13.0 11.3 - 14.5 %    RDW-SD 41.7 37.0 - 54.0 fl    MPV 10.2 6.0 - 12.0 fL    Platelets 339 150 - 450 10*3/mm3    Neutrophil % 58.0 41.0 - 71.0 %    Lymphocyte % 29.4 24.0 - 44.0 %    Monocyte % 7.2 0.0 - 12.0 %    Eosinophil % 4.6 (H) 0.0 - 3.0 %    Basophil % 0.5 0.0 - 1.0 %    Immature Grans % 0.3 0.0 - 0.6 %    Neutrophils, Absolute 4.55 1.50 - 8.30 10*3/mm3    Lymphocytes, Absolute 2.30 0.60 - 4.80 10*3/mm3    Monocytes, Absolute 0.56 0.00 - 1.00 10*3/mm3    Eosinophils, Absolute 0.36 (H) 0.00 - 0.30 10*3/mm3    Basophils, Absolute 0.04 0.00 - 0.20 10*3/mm3    Immature Grans, Absolute 0.02 0.00 - 0.03 10*3/mm3   Urinalysis With Microscopic If Indicated (No Culture) - Urine, Clean Catch    Collection Time: 08/08/18  8:26 PM   Result Value Ref Range    Color, UA Dark Yellow (A) Yellow, Straw    Appearance, UA Clear Clear    pH, UA 5.5 5.0 - 8.0    Specific Gravity, UA 1.008 1.001 - 1.030    Glucose, UA Negative  "Negative    Ketones, UA Negative Negative    Bilirubin, UA Negative Negative    Blood, UA Negative Negative    Protein, UA Negative Negative    Leuk Esterase, UA Trace (A) Negative    Nitrite, UA Positive (A) Negative    Urobilinogen, UA 1.0 E.U./dL 0.2 - 1.0 E.U./dL   Urinalysis, Microscopic Only - Urine, Clean Catch    Collection Time: 08/08/18  8:26 PM   Result Value Ref Range    RBC, UA 0-2 None Seen, 0-2 /HPF    WBC, UA 0-2 None Seen, 0-2 /HPF    Bacteria, UA None Seen None Seen, Trace /HPF    Squamous Epithelial Cells, UA 0-2 None Seen, 0-2 /HPF    Hyaline Casts, UA None Seen 0 - 6 /LPF    Methodology Automated Microscopy      Note: In addition to lab results from this visit, the labs listed above may include labs taken at another facility or during a different encounter within the last 24 hours. Please correlate lab times with ED admission and discharge times for further clarification of the services performed during this visit.    CT Abdomen Pelvis Without Contrast   Final Result     Single non-obstructing intrarenal calculus on the left.  No other urinary    calculi.  No hydronephrosis.        No other acute findings.      THIS DOCUMENT HAS BEEN ELECTRONICALLY SIGNED BY LAINE TATE MD        Vitals:    08/08/18 1849 08/08/18 1940 08/08/18 2000   BP: 158/95 133/82 138/94   BP Location: Left arm     Patient Position: Sitting     Pulse: 76  75   Resp: 14     Temp: 97.7 °F (36.5 °C)     TempSrc: Oral     SpO2: 100% 97% 95%   Weight: 111 kg (244 lb)     Height: 170.2 cm (67\")       Medications   sodium chloride 0.9 % flush 10 mL (not administered)   sodium chloride 0.9 % bolus 1,000 mL (0 mL Intravenous Stopped 8/8/18 2009)   ketorolac (TORADOL) injection 10 mg (10 mg Intravenous Given 8/8/18 1915)   lidocaine (XYLOCAINE) 1 % 150 mg in sodium chloride 0.9 % 250 mL IVPB (150 mg Intravenous Given 8/8/18 1931)   phenazopyridine (PYRIDIUM) tablet 200 mg (200 mg Oral Given 8/8/18 1915)   ondansetron (ZOFRAN) " injection 4 mg (4 mg Intravenous Given 8/8/18 1915)   Morphine sulfate (PF) injection 4 mg (4 mg Intravenous Given 8/8/18 2009)   oxyCODONE-acetaminophen (PERCOCET) 5-325 MG per tablet 1 tablet (1 tablet Oral Given 8/8/18 2031)     ECG/EMG Results (last 24 hours)     ** No results found for the last 24 hours. **                        MDM  Number of Diagnoses or Management Options  Renal colic on left side:   Ureterolithiasis:      Amount and/or Complexity of Data Reviewed  Clinical lab tests: reviewed  Tests in the radiology section of CPT®: reviewed  Independent visualization of images, tracings, or specimens: yes        Final diagnoses:   Ureterolithiasis   Renal colic on left side       Documentation assistance provided by gabi Polanco.  Information recorded by the gabi was done at my direction and has been verified and validated by me.     Yovani Polanco  08/08/18 1907       Yovani Polanco  08/08/18 1908       Killian Deluca MD  08/08/18 2051

## 2018-08-09 NOTE — DISCHARGE INSTRUCTIONS
CONTROLLED SUBSTANCE(S) EDUCATION  Controlled Substances have been prescribed by your provider to treat your medical condition and associated symptoms. Although Controlled Substances can be effective in relieving your pain or other symptoms, they may also cause serious adverse effects. It is important that you understand how to safely and appropriately take these medications.  Proper Use  1. Carefully following instructions for use, including timing of doses, whether to take the  medication with or without food, and any foods or other medications to avoid while taking the medication;  2. If you have low or impaired vision you should wear glasses when taking the medication and not take the medication in the dark;  3. You should read the prescription container label each time to confirm the dosage;  4. You should never use the medication after the expiration date;  5. You must never share the medication with others;  6. You must not take the medication with alcohol or other sedatives;  7. You should not take the medication to help you sleep;  8. You should never break, crush or chew the medication;  9. If you have been prescribed a skin patch (transdermal), external heat, fever and exertion can increase the absorption of these products, leading to potentially fatal overdose;  10. You should immediately contact the physician?s office to report any adverse reaction and,  11. It is illegal to share, sell or give away Controlled Substances.  Driving and Work Safety  1. Controlled Substances may cause sleepiness, clouded thinking, decreased concentration, slower reflexes, or incoordination, all of which may create a danger to you and others when driving or operating certain type of machinery;  2. Avoid, if possible, driving or engaging in other potentially dangerous work or other activities, for a specific period of time until the initial effects of the Controlled Substances no longer create such dangers; and,  3.  Ingesting other substances, such as alcohol, benzodiazepines or some cold remedies, at the same time you are taking the Controlled Substances prescribed or dispensed may increase cognitive and motor impairment.  Pregnancy  If you are pregnant or nursing a baby, avoid using Controlled Substances, or use them on a minimal basis in strict accordance with your provider?s instructions.  Potential for Overdose and Response  1. The use of Controlled Substances creates a risk of respiratory depression, which may result in serious harm or death. You and others should be watchful for the following warning signs of overmedication:  ? intoxicated behavior, such as confusion, slurred speech, or stumbling;  ? feeling dizzy or faint;  ? acting very drowsy or groggy;  ? unusual snoring, gasping, or snorting during sleep;  ? and/or difficulty waking up from sleep or difficulty in staying awake.  2. Immediately call ?911? or an emergency service upon you or your caregivers observing or experiencing any of the following conditions:  ? you cannot be aroused or waken, or are unable to talk after being awakened;  ? you have shortness of breath, slow or light breathing, or stopped breathing;  ? gurgling noises coming from your mouth or throat;  ? your body is limp, seems lifeless;  ? your face is pale or clammy;  ? your fingernails or lips are turning purple or blue; and/or  ? your heartbeat is slow, unusual or stopped  Safe Storage of Controlled Substances  1. If your Controlled Substances are not stored in a safe manner there is a potential that  partners, family members or others may improperly obtain your Controlled Substances;  2. Always keep the Controlled Substances in the original container;  3. Store Controlled Substances in a locked cabinet or other secure storage unit, that is cool, dry and out of direct sunlight, such as:  ? an existing safe;  ? a cut-proof travel bag;  ? a portable lock box designed for travel; or,  ? a  locking medical box.  4. Do not store Controlled Substances in:  ? an unlocked medicine cabinet;  ? in your car; or,  ? in a refrigerator or freezer unless specifically recommended by the prescriber or  pharmacist; and  5. Immediately notify your provider if any Controlled Substances prescribed or dispensed by the provider are stolen or improperly taken by another individual.  Proper Disposal  1. It is important to safely and appropriately dispose of unused Controlled Substances that had been prescribed or dispensed by your provider;  2. Promptly dispose of unused Controlled Substances after the expiration date of the  prescription or after you no longer require the Controlled Substances to treat your medical condition;  3. In order to safely dispose of Controlled Substances, you should turn in the unused Controlled Substances as part of an approved governmental drug take-back program. The [place] Office of Drug Control Policy has a listing of [place] Permanent Drug Disposal Locations at http://www.odcp.[place].gov - click on the [place] Prescriptions Drug Drop Map and Location on the left side of the page.  4. You should not flush Controlled Substances down the toilet; and,  5. You should personally remove any identifying information, including the prescription number, from an empty Controlled Substance container and then properly dispose of the empty container.  CONSENT FOR TREATMENT WITH CONTROLLED SUBSTANCE(S)  (This is for an initial prescription)  1. Controlled Substances  Controlled Substances are prescribed to treat a variety of conditions, including the relief  of chronic pain, to provide stimulation, promote weight loss, and treat mood disorders.  Pain relief is an important medical reason to take Controlled Substances.  Controlled Substances are drugs or chemical substances whose possession and use are  regulated under the Controlled Substances Act. The law requires that patient are informed of the risks,  benefits, and alternatives of taking Controlled Substances.  2. Adverse Effects  As with any medication, there are risks and adverse effects associated with the use of  Controlled Substances. Common adverse effects of pain medicines could include, but are not limited to: sedation or sleepiness, nausea, vomiting, constipation, pruritus (itching), confusion, respiratory depression, and urinary retention. Some of these effects may make it unsafe for you to drive a vehicle, operate heavy machinery, or perform other tasks that require concentration and coordination. Excessive use of these Controlled Substances can lead to profound sedation, respiratory depression, coma, and/or death. Regarding stimulants, adverse effects could include, but are not limited to: drug dependency, neuropsychiatric symptoms such as psychosis and hetal, weight loss, cardiovascular events such as heart attack and stroke, insomnia, hypertension, and agitation. Any questions you have regarding the Controlled Substance(s) should be discussed with the prescribing provider.  3. Physical Dependence, Tolerance, and Addiction  Although uncommon when used for their clinical indications, both pain relievers and  stimulants can cause physical dependence, tolerance, and/or addiction when used for a  prolonged period. Maintenance therapy with these Controlled Substances can cause  physical dependence. This means that if these medications are abruptly stopped, or  decreased significantly over a short period of time, a patient may experience withdrawal  symptoms such as: nervousness, irritability, insomnia, sweating, abdominal cramping,  nausea, vomiting, and diarrhea. Tolerance occurs when the effects of these Controlled  Substances are decreased over a period of prolonged use making it necessary to increase the dosage. Physical dependence and tolerance are different than addiction. Addiction is a complex disease characterized by compulsive craving or seeking  and use of a substance despite its extreme negatives on a person. The risk of addiction may be increased in a patient with a history of alcoholism or other addiction.  4. Alternatives  Controlled Substances are routinely prescribed to treat moderate to severe pain or other  medical conditions. Other medicines are available to treat these conditions that are not  associated with tolerance or addiction, however, are associated with a lower level of pain  relief or stimulation. It may also be an alternative to not take any medicine to treat these  conditions, or to use alternative modalities, other than medicine to treat these conditions.  I voluntarily consent to the receipt of the above-named Controlled Substance(s) as prescribed by my provider. I have been informed of the benefits, risks, and alternatives to taking these medications. I acknowledge that I have read and understood all of the information above and I have had the opportunity to ask questions and have them answered to my satisfaction.

## 2018-09-09 ENCOUNTER — TRANSCRIBE ORDERS (OUTPATIENT)
Dept: GENERAL RADIOLOGY | Facility: HOSPITAL | Age: 38
End: 2018-09-09

## 2018-09-09 ENCOUNTER — HOSPITAL ENCOUNTER (OUTPATIENT)
Dept: GENERAL RADIOLOGY | Facility: HOSPITAL | Age: 38
Discharge: HOME | End: 2018-09-09
Admitting: PHYSICIAN ASSISTANT

## 2018-09-09 ENCOUNTER — HOSPITAL ENCOUNTER (OUTPATIENT)
Dept: GENERAL RADIOLOGY | Facility: HOSPITAL | Age: 38
Discharge: HOME | End: 2018-09-09

## 2018-09-09 DIAGNOSIS — R05.9 COUGH: ICD-10-CM

## 2018-09-09 DIAGNOSIS — R05.9 COUGH: Primary | ICD-10-CM

## 2018-09-09 PROCEDURE — 71046 X-RAY EXAM CHEST 2 VIEWS: CPT

## 2018-11-20 ENCOUNTER — HOSPITAL ENCOUNTER (EMERGENCY)
Facility: HOSPITAL | Age: 38
Discharge: HOME | End: 2018-11-20
Attending: EMERGENCY MEDICINE | Admitting: EMERGENCY MEDICINE

## 2018-11-20 ENCOUNTER — APPOINTMENT (OUTPATIENT)
Dept: ULTRASOUND IMAGING | Facility: HOSPITAL | Age: 38
End: 2018-11-20

## 2018-11-20 ENCOUNTER — APPOINTMENT (OUTPATIENT)
Dept: GENERAL RADIOLOGY | Facility: HOSPITAL | Age: 38
End: 2018-11-20

## 2018-11-20 VITALS
WEIGHT: 230 LBS | HEIGHT: 67 IN | BODY MASS INDEX: 36.1 KG/M2 | SYSTOLIC BLOOD PRESSURE: 128 MMHG | RESPIRATION RATE: 18 BRPM | DIASTOLIC BLOOD PRESSURE: 68 MMHG | HEART RATE: 89 BPM | TEMPERATURE: 98.1 F | OXYGEN SATURATION: 98 %

## 2018-11-20 DIAGNOSIS — R10.2 PELVIC PAIN: Primary | ICD-10-CM

## 2018-11-20 DIAGNOSIS — N83.202 LEFT OVARIAN CYST: ICD-10-CM

## 2018-11-20 LAB
ALBUMIN SERPL-MCNC: 4.38 G/DL (ref 3.2–4.8)
ALBUMIN/GLOB SERPL: 1.6 G/DL (ref 1.5–2.5)
ALP SERPL-CCNC: 80 U/L (ref 25–100)
ALT SERPL W P-5'-P-CCNC: 42 U/L (ref 7–40)
ANION GAP SERPL CALCULATED.3IONS-SCNC: 8 MMOL/L (ref 3–11)
AST SERPL-CCNC: 23 U/L (ref 0–33)
B-HCG UR QL: NEGATIVE
BACTERIA UR QL AUTO: NORMAL /HPF
BASOPHILS # BLD AUTO: 0.03 10*3/MM3 (ref 0–0.2)
BASOPHILS NFR BLD AUTO: 0.3 % (ref 0–1)
BILIRUB SERPL-MCNC: 0.4 MG/DL (ref 0.3–1.2)
BILIRUB UR QL STRIP: NEGATIVE
BUN BLD-MCNC: 10 MG/DL (ref 9–23)
BUN/CREAT SERPL: 14.7 (ref 7–25)
CALCIUM SPEC-SCNC: 9.3 MG/DL (ref 8.7–10.4)
CHLORIDE SERPL-SCNC: 102 MMOL/L (ref 99–109)
CLARITY UR: ABNORMAL
CO2 SERPL-SCNC: 27 MMOL/L (ref 20–31)
COLOR UR: YELLOW
CREAT BLD-MCNC: 0.68 MG/DL (ref 0.6–1.3)
DEPRECATED RDW RBC AUTO: 41.9 FL (ref 37–54)
EOSINOPHIL # BLD AUTO: 0.36 10*3/MM3 (ref 0–0.3)
EOSINOPHIL NFR BLD AUTO: 3.4 % (ref 0–3)
ERYTHROCYTE [DISTWIDTH] IN BLOOD BY AUTOMATED COUNT: 13.1 % (ref 11.3–14.5)
GFR SERPL CREATININE-BSD FRML MDRD: 97 ML/MIN/1.73
GLOBULIN UR ELPH-MCNC: 2.7 GM/DL
GLUCOSE BLD-MCNC: 110 MG/DL (ref 70–100)
GLUCOSE UR STRIP-MCNC: NEGATIVE MG/DL
HCT VFR BLD AUTO: 39.6 % (ref 34.5–44)
HGB BLD-MCNC: 12.7 G/DL (ref 11.5–15.5)
HGB UR QL STRIP.AUTO: NEGATIVE
HOLD SPECIMEN: NORMAL
HOLD SPECIMEN: NORMAL
HYALINE CASTS UR QL AUTO: NORMAL /LPF
IMM GRANULOCYTES # BLD: 0.01 10*3/MM3 (ref 0–0.03)
IMM GRANULOCYTES NFR BLD: 0.1 % (ref 0–0.6)
INTERNAL NEGATIVE CONTROL: NEGATIVE
INTERNAL POSITIVE CONTROL: POSITIVE
KETONES UR QL STRIP: NEGATIVE
LEUKOCYTE ESTERASE UR QL STRIP.AUTO: ABNORMAL
LIPASE SERPL-CCNC: 54 U/L (ref 6–51)
LYMPHOCYTES # BLD AUTO: 2.31 10*3/MM3 (ref 0.6–4.8)
LYMPHOCYTES NFR BLD AUTO: 21.9 % (ref 24–44)
Lab: NORMAL
MCH RBC QN AUTO: 28 PG (ref 27–31)
MCHC RBC AUTO-ENTMCNC: 32.1 G/DL (ref 32–36)
MCV RBC AUTO: 87.4 FL (ref 80–99)
MONOCYTES # BLD AUTO: 0.84 10*3/MM3 (ref 0–1)
MONOCYTES NFR BLD AUTO: 8 % (ref 0–12)
NEUTROPHILS # BLD AUTO: 6.99 10*3/MM3 (ref 1.5–8.3)
NEUTROPHILS NFR BLD AUTO: 66.4 % (ref 41–71)
NITRITE UR QL STRIP: NEGATIVE
PH UR STRIP.AUTO: 7.5 [PH] (ref 5–8)
PLATELET # BLD AUTO: 404 10*3/MM3 (ref 150–450)
PMV BLD AUTO: 9.8 FL (ref 6–12)
POTASSIUM BLD-SCNC: 3.6 MMOL/L (ref 3.5–5.5)
PROT SERPL-MCNC: 7.1 G/DL (ref 5.7–8.2)
PROT UR QL STRIP: NEGATIVE
RBC # BLD AUTO: 4.53 10*6/MM3 (ref 3.89–5.14)
RBC # UR: NORMAL /HPF
REF LAB TEST METHOD: NORMAL
SODIUM BLD-SCNC: 137 MMOL/L (ref 132–146)
SP GR UR STRIP: 1.02 (ref 1–1.03)
SQUAMOUS #/AREA URNS HPF: NORMAL /HPF
UROBILINOGEN UR QL STRIP: ABNORMAL
WBC NRBC COR # BLD: 10.53 10*3/MM3 (ref 3.5–10.8)
WBC UR QL AUTO: NORMAL /HPF
WHOLE BLOOD HOLD SPECIMEN: NORMAL
WHOLE BLOOD HOLD SPECIMEN: NORMAL

## 2018-11-20 PROCEDURE — 93976 VASCULAR STUDY: CPT

## 2018-11-20 PROCEDURE — 96375 TX/PRO/DX INJ NEW DRUG ADDON: CPT

## 2018-11-20 PROCEDURE — 25010000002 HYDROMORPHONE PER 4 MG: Performed by: EMERGENCY MEDICINE

## 2018-11-20 PROCEDURE — 80053 COMPREHEN METABOLIC PANEL: CPT | Performed by: EMERGENCY MEDICINE

## 2018-11-20 PROCEDURE — 99283 EMERGENCY DEPT VISIT LOW MDM: CPT

## 2018-11-20 PROCEDURE — 83690 ASSAY OF LIPASE: CPT | Performed by: EMERGENCY MEDICINE

## 2018-11-20 PROCEDURE — 74018 RADEX ABDOMEN 1 VIEW: CPT

## 2018-11-20 PROCEDURE — 81001 URINALYSIS AUTO W/SCOPE: CPT | Performed by: EMERGENCY MEDICINE

## 2018-11-20 PROCEDURE — 25010000002 ONDANSETRON PER 1 MG: Performed by: EMERGENCY MEDICINE

## 2018-11-20 PROCEDURE — 96374 THER/PROPH/DIAG INJ IV PUSH: CPT

## 2018-11-20 PROCEDURE — 25010000002 KETOROLAC TROMETHAMINE PER 15 MG: Performed by: EMERGENCY MEDICINE

## 2018-11-20 PROCEDURE — 81025 URINE PREGNANCY TEST: CPT | Performed by: EMERGENCY MEDICINE

## 2018-11-20 PROCEDURE — 96376 TX/PRO/DX INJ SAME DRUG ADON: CPT

## 2018-11-20 PROCEDURE — 76830 TRANSVAGINAL US NON-OB: CPT

## 2018-11-20 PROCEDURE — 85025 COMPLETE CBC W/AUTO DIFF WBC: CPT | Performed by: EMERGENCY MEDICINE

## 2018-11-20 RX ORDER — HYDROMORPHONE HYDROCHLORIDE 1 MG/ML
0.25 INJECTION, SOLUTION INTRAMUSCULAR; INTRAVENOUS; SUBCUTANEOUS ONCE
Status: COMPLETED | OUTPATIENT
Start: 2018-11-20 | End: 2018-11-20

## 2018-11-20 RX ORDER — SODIUM CHLORIDE 0.9 % (FLUSH) 0.9 %
10 SYRINGE (ML) INJECTION AS NEEDED
Status: DISCONTINUED | OUTPATIENT
Start: 2018-11-20 | End: 2018-11-20 | Stop reason: HOSPADM

## 2018-11-20 RX ORDER — KETOROLAC TROMETHAMINE 15 MG/ML
10 INJECTION, SOLUTION INTRAMUSCULAR; INTRAVENOUS ONCE
Status: COMPLETED | OUTPATIENT
Start: 2018-11-20 | End: 2018-11-20

## 2018-11-20 RX ORDER — ONDANSETRON 2 MG/ML
4 INJECTION INTRAMUSCULAR; INTRAVENOUS ONCE
Status: COMPLETED | OUTPATIENT
Start: 2018-11-20 | End: 2018-11-20

## 2018-11-20 RX ADMIN — KETOROLAC TROMETHAMINE 10 MG: 15 INJECTION, SOLUTION INTRAMUSCULAR; INTRAVENOUS at 11:28

## 2018-11-20 RX ADMIN — ONDANSETRON 4 MG: 2 INJECTION INTRAMUSCULAR; INTRAVENOUS at 11:28

## 2018-11-20 RX ADMIN — HYDROMORPHONE HYDROCHLORIDE 0.25 MG: 1 INJECTION, SOLUTION INTRAMUSCULAR; INTRAVENOUS; SUBCUTANEOUS at 14:37

## 2018-11-20 RX ADMIN — HYDROMORPHONE HYDROCHLORIDE 0.25 MG: 1 INJECTION, SOLUTION INTRAMUSCULAR; INTRAVENOUS; SUBCUTANEOUS at 12:11

## 2018-11-20 NOTE — ED PROVIDER NOTES
Subjective   Ms. Garcia started having left flank pain shortly before arrival.  It makes her restless, she has been sweaty and nauseated.  The pain radiates to her suprapubic area and vagina.  It feels the same as a kidney stone she had a few months ago.  A CT scan at that time showed a non-obstructive stone in her left kidney.        History provided by:  Patient  Flank Pain   Pain location:  L flank  Pain quality: aching    Pain radiates to:  LLQ and perineum  Pain severity:  Severe  Onset quality:  Sudden  Duration:  1 hour  Timing:  Constant  Chronicity:  Recurrent  Relieved by:  None tried  Worsened by:  Nothing  Ineffective treatments:  None tried  Associated symptoms: nausea    Associated symptoms: no vomiting        Review of Systems   Constitutional: Negative.    HENT:        On the improving side of a URI   Respiratory:        Improving URI   Cardiovascular: Negative.    Gastrointestinal: Positive for nausea. Negative for vomiting.   Genitourinary: Positive for flank pain.   Neurological: Negative.    Psychiatric/Behavioral: Negative.    All other systems reviewed and are negative.      Past Medical History:   Diagnosis Date   • Anxiety    • Depression    • Disease of thyroid gland    • GERD (gastroesophageal reflux disease)    • Kidney stone        Allergies   Allergen Reactions   • Prednisone Other (See Comments)     Makes her crazy    • Brethaire [Terbutaline]    • Zithromax [Azithromycin] GI Intolerance   • Amoxicillin Rash       Past Surgical History:   Procedure Laterality Date   • CHOLECYSTECTOMY     • LEEP         History reviewed. No pertinent family history.    Social History     Socioeconomic History   • Marital status:      Spouse name: Not on file   • Number of children: Not on file   • Years of education: Not on file   • Highest education level: Not on file   Tobacco Use   • Smoking status: Former Smoker     Last attempt to quit: 2013     Years since quittin.7   Substance and  Sexual Activity   • Alcohol use: Yes     Alcohol/week: 1.8 - 3.6 oz     Types: 1 - 2 Glasses of wine, 1 - 2 Cans of beer, 1 - 2 Shots of liquor per week     Comment: OCCAS   • Drug use: No   • Sexual activity: Defer           Objective   Physical Exam   Constitutional: She is oriented to person, place, and time. She appears well-developed and well-nourished. No distress.   HENT:   Head: Atraumatic.   Airway patent   Eyes: Conjunctivae are normal.   Neck: Phonation normal. Neck supple.   Cardiovascular: Regular rhythm. Tachycardia present.   Pulmonary/Chest: Effort normal and breath sounds normal. No respiratory distress.   Abdominal: Soft. There is no tenderness.   Musculoskeletal:   Minimal left CVAT   Neurological: She is alert and oriented to person, place, and time.   Skin: Skin is warm and dry.   Psychiatric: She has a normal mood and affect. Her behavior is normal.   Nursing note and vitals reviewed.      Procedures           ED Course  ED Course as of Nov 20 1611   Tue Nov 20, 2018   1251 She is more comfortable at this time.  She adds history of periodic left pelvic pain without diagnosis.  I have reviewed her recent CT and her KUB today with Dr. Cantu.  No   [LI]   1534 She is much more comfortable at this time.  No further pain meds in last hour and only tiny dose of Dilaudid given then.  She relates that she had obvious left pelvic pain with the US probe during the US exam.  [LI]      ED Course User Index  [LI] Rufino Pires MD      Recent Results (from the past 24 hour(s))   Comprehensive Metabolic Panel    Collection Time: 11/20/18 11:28 AM   Result Value Ref Range    Glucose 110 (H) 70 - 100 mg/dL    BUN 10 9 - 23 mg/dL    Creatinine 0.68 0.60 - 1.30 mg/dL    Sodium 137 132 - 146 mmol/L    Potassium 3.6 3.5 - 5.5 mmol/L    Chloride 102 99 - 109 mmol/L    CO2 27.0 20.0 - 31.0 mmol/L    Calcium 9.3 8.7 - 10.4 mg/dL    Total Protein 7.1 5.7 - 8.2 g/dL    Albumin 4.38 3.20 - 4.80 g/dL    ALT (SGPT) 42  (H) 7 - 40 U/L    AST (SGOT) 23 0 - 33 U/L    Alkaline Phosphatase 80 25 - 100 U/L    Total Bilirubin 0.4 0.3 - 1.2 mg/dL    eGFR Non African Amer 97 >60 mL/min/1.73    Globulin 2.7 gm/dL    A/G Ratio 1.6 1.5 - 2.5 g/dL    BUN/Creatinine Ratio 14.7 7.0 - 25.0    Anion Gap 8.0 3.0 - 11.0 mmol/L   Lipase    Collection Time: 11/20/18 11:28 AM   Result Value Ref Range    Lipase 54 (H) 6 - 51 U/L   Urinalysis With Microscopic If Indicated (No Culture) - Urine, Clean Catch    Collection Time: 11/20/18 11:28 AM   Result Value Ref Range    Color, UA Yellow Yellow, Straw    Appearance, UA Cloudy (A) Clear    pH, UA 7.5 5.0 - 8.0    Specific Gravity, UA 1.022 1.001 - 1.030    Glucose, UA Negative Negative    Ketones, UA Negative Negative    Bilirubin, UA Negative Negative    Blood, UA Negative Negative    Protein, UA Negative Negative    Leuk Esterase, UA Trace (A) Negative    Nitrite, UA Negative Negative    Urobilinogen, UA 0.2 E.U./dL 0.2 - 1.0 E.U./dL   Light Blue Top    Collection Time: 11/20/18 11:28 AM   Result Value Ref Range    Extra Tube hold for add-on    Green Top (Gel)    Collection Time: 11/20/18 11:28 AM   Result Value Ref Range    Extra Tube Hold for add-ons.    Lavender Top    Collection Time: 11/20/18 11:28 AM   Result Value Ref Range    Extra Tube hold for add-on    Gold Top - SST    Collection Time: 11/20/18 11:28 AM   Result Value Ref Range    Extra Tube Hold for add-ons.    CBC Auto Differential    Collection Time: 11/20/18 11:28 AM   Result Value Ref Range    WBC 10.53 3.50 - 10.80 10*3/mm3    RBC 4.53 3.89 - 5.14 10*6/mm3    Hemoglobin 12.7 11.5 - 15.5 g/dL    Hematocrit 39.6 34.5 - 44.0 %    MCV 87.4 80.0 - 99.0 fL    MCH 28.0 27.0 - 31.0 pg    MCHC 32.1 32.0 - 36.0 g/dL    RDW 13.1 11.3 - 14.5 %    RDW-SD 41.9 37.0 - 54.0 fl    MPV 9.8 6.0 - 12.0 fL    Platelets 404 150 - 450 10*3/mm3    Neutrophil % 66.4 41.0 - 71.0 %    Lymphocyte % 21.9 (L) 24.0 - 44.0 %    Monocyte % 8.0 0.0 - 12.0 %     Eosinophil % 3.4 (H) 0.0 - 3.0 %    Basophil % 0.3 0.0 - 1.0 %    Immature Grans % 0.1 0.0 - 0.6 %    Neutrophils, Absolute 6.99 1.50 - 8.30 10*3/mm3    Lymphocytes, Absolute 2.31 0.60 - 4.80 10*3/mm3    Monocytes, Absolute 0.84 0.00 - 1.00 10*3/mm3    Eosinophils, Absolute 0.36 (H) 0.00 - 0.30 10*3/mm3    Basophils, Absolute 0.03 0.00 - 0.20 10*3/mm3    Immature Grans, Absolute 0.01 0.00 - 0.03 10*3/mm3   Urinalysis, Microscopic Only - Urine, Clean Catch    Collection Time: 11/20/18 11:28 AM   Result Value Ref Range    RBC, UA 0-2 None Seen, 0-2 /HPF    WBC, UA 0-2 None Seen, 0-2 /HPF    Bacteria, UA None Seen None Seen, Trace /HPF    Squamous Epithelial Cells, UA 0-2 None Seen, 0-2 /HPF    Hyaline Casts, UA 0-6 0 - 6 /LPF    Methodology Automated Microscopy    POCT pregnancy, urine    Collection Time: 11/20/18 11:33 AM   Result Value Ref Range    HCG, Urine, QL Negative Negative    Lot Number [id number]     Internal Positive Control Positive     Internal Negative Control Negative      Note: In addition to lab results from this visit, the labs listed above may include labs taken at another facility or during a different encounter within the last 24 hours. Please correlate lab times with ED admission and discharge times for further clarification of the services performed during this visit.    US Non-ob Transvaginal   Preliminary Result   No evidence of torsion. There is a complex cyst seen on the   left ovary measuring 1.7 cm. The remainder of the ultrasound of the   pelvis is unremarkable.       D:  11/20/2018   E:  11/20/2018                   US Testicular or Ovarian Vascular Limited   Preliminary Result   No evidence of torsion. There is a complex cyst seen on the   left ovary measuring 1.7 cm. The remainder of the ultrasound of the   pelvis is unremarkable.       D:  11/20/2018   E:  11/20/2018                   XR Abdomen KUB   Preliminary Result   1. Faint suggestion of minute calculi in the left lower renal  "pole as on   previous CT scan.    2. Stable pelvic phleboliths, and what appears to be a small benign bone   island in the left sacrum. Allowing for this, no evidence of potential   ureteral calculus.   3. Mild fecal stasis noted.       D:  11/20/2018   E:  11/20/2018                Vitals:    11/20/18 1109 11/20/18 1245 11/20/18 1547   BP: (!) 160/105 139/98 128/68   BP Location:   Left arm   Patient Position:   Sitting   Pulse: 101  89   Resp: 18     Temp: 97.8 °F (36.6 °C)  98.1 °F (36.7 °C)   TempSrc: Oral  Oral   SpO2: 100% 97% 98%   Weight: 104 kg (230 lb)     Height: 170.2 cm (67\")       Medications   sodium chloride 0.9 % flush 10 mL (not administered)   ketorolac (TORADOL) injection 10 mg (10 mg Intravenous Given 11/20/18 1128)   ondansetron (ZOFRAN) injection 4 mg (4 mg Intravenous Given 11/20/18 1128)   HYDROmorphone (DILAUDID) injection 0.25 mg (0.25 mg Intravenous Given 11/20/18 1211)   HYDROmorphone (DILAUDID) injection 0.25 mg (0.25 mg Intravenous Given 11/20/18 1437)     ECG/EMG Results (last 24 hours)     ** No results found for the last 24 hours. **                    MDM      Final diagnoses:   Pelvic pain   Left ovarian cyst            Rufino Pires MD  11/20/18 1612    "

## 2018-11-20 NOTE — DISCHARGE INSTRUCTIONS
Follow up with NACHO Boyle.    Immediately return to the emergency department if you develop new or worsening symptoms.

## 2019-01-18 RX ORDER — LEVOTHYROXINE SODIUM 50 UG/1
50 CAPSULE ORAL DAILY
Qty: 30 CAPSULE | Refills: 2 | Status: CANCELLED | OUTPATIENT
Start: 2019-01-18

## 2019-02-21 ENCOUNTER — TRANSCRIBE ORDERS (OUTPATIENT)
Dept: NUTRITION | Facility: HOSPITAL | Age: 39
End: 2019-02-21

## 2019-02-21 DIAGNOSIS — R63.5 ABNORMAL WEIGHT GAIN: Primary | ICD-10-CM

## 2019-07-21 ENCOUNTER — HOSPITAL ENCOUNTER (EMERGENCY)
Facility: HOSPITAL | Age: 39
Discharge: HOME | End: 2019-07-21
Attending: EMERGENCY MEDICINE | Admitting: EMERGENCY MEDICINE

## 2019-07-21 VITALS
OXYGEN SATURATION: 97 % | BODY MASS INDEX: 40.02 KG/M2 | RESPIRATION RATE: 18 BRPM | WEIGHT: 255 LBS | HEART RATE: 81 BPM | SYSTOLIC BLOOD PRESSURE: 140 MMHG | DIASTOLIC BLOOD PRESSURE: 90 MMHG | TEMPERATURE: 99 F | HEIGHT: 67 IN

## 2019-07-21 DIAGNOSIS — R42 ACUTE ONSET OF SEVERE VERTIGO: Primary | ICD-10-CM

## 2019-07-21 LAB
ALBUMIN SERPL-MCNC: 4.1 G/DL (ref 3.5–5.2)
ALBUMIN/GLOB SERPL: 1.1 G/DL
ALP SERPL-CCNC: 75 U/L (ref 39–117)
ALT SERPL W P-5'-P-CCNC: 32 U/L (ref 1–33)
ANION GAP SERPL CALCULATED.3IONS-SCNC: 11 MMOL/L (ref 5–15)
AST SERPL-CCNC: 32 U/L (ref 1–32)
BASOPHILS # BLD AUTO: 0.07 10*3/MM3 (ref 0–0.2)
BASOPHILS NFR BLD AUTO: 1 % (ref 0–1.5)
BILIRUB SERPL-MCNC: 0.3 MG/DL (ref 0.2–1.2)
BUN BLD-MCNC: 13 MG/DL (ref 6–20)
BUN/CREAT SERPL: 16 (ref 7–25)
CALCIUM SPEC-SCNC: 9.6 MG/DL (ref 8.6–10.5)
CHLORIDE SERPL-SCNC: 106 MMOL/L (ref 98–107)
CO2 SERPL-SCNC: 25 MMOL/L (ref 22–29)
CREAT BLD-MCNC: 0.81 MG/DL (ref 0.57–1)
DEPRECATED RDW RBC AUTO: 39.9 FL (ref 37–54)
EOSINOPHIL # BLD AUTO: 0.22 10*3/MM3 (ref 0–0.4)
EOSINOPHIL NFR BLD AUTO: 3.2 % (ref 0.3–6.2)
ERYTHROCYTE [DISTWIDTH] IN BLOOD BY AUTOMATED COUNT: 12.9 % (ref 12.3–15.4)
GFR SERPL CREATININE-BSD FRML MDRD: 79 ML/MIN/1.73
GLOBULIN UR ELPH-MCNC: 3.9 GM/DL
GLUCOSE BLD-MCNC: 134 MG/DL (ref 65–99)
HCT VFR BLD AUTO: 36.4 % (ref 34–46.6)
HGB BLD-MCNC: 11.5 G/DL (ref 12–15.9)
HOLD SPECIMEN: NORMAL
HOLD SPECIMEN: NORMAL
IMM GRANULOCYTES # BLD AUTO: 0.04 10*3/MM3 (ref 0–0.05)
IMM GRANULOCYTES NFR BLD AUTO: 0.6 % (ref 0–0.5)
LYMPHOCYTES # BLD AUTO: 1.42 10*3/MM3 (ref 0.7–3.1)
LYMPHOCYTES NFR BLD AUTO: 20.6 % (ref 19.6–45.3)
MCH RBC QN AUTO: 27.4 PG (ref 26.6–33)
MCHC RBC AUTO-ENTMCNC: 31.6 G/DL (ref 31.5–35.7)
MCV RBC AUTO: 86.7 FL (ref 79–97)
MONOCYTES # BLD AUTO: 0.58 10*3/MM3 (ref 0.1–0.9)
MONOCYTES NFR BLD AUTO: 8.4 % (ref 5–12)
NEUTROPHILS # BLD AUTO: 4.56 10*3/MM3 (ref 1.7–7)
NEUTROPHILS NFR BLD AUTO: 66.2 % (ref 42.7–76)
NRBC BLD AUTO-RTO: 0 /100 WBC (ref 0–0.2)
PLATELET # BLD AUTO: 397 10*3/MM3 (ref 140–450)
PMV BLD AUTO: 9.5 FL (ref 6–12)
POTASSIUM BLD-SCNC: 4 MMOL/L (ref 3.5–5.2)
PROT SERPL-MCNC: 8 G/DL (ref 6–8.5)
RBC # BLD AUTO: 4.2 10*6/MM3 (ref 3.77–5.28)
SODIUM BLD-SCNC: 142 MMOL/L (ref 136–145)
WBC NRBC COR # BLD: 6.89 10*3/MM3 (ref 3.4–10.8)
WHOLE BLOOD HOLD SPECIMEN: NORMAL
WHOLE BLOOD HOLD SPECIMEN: NORMAL

## 2019-07-21 PROCEDURE — 25010000002 LORAZEPAM PER 2 MG

## 2019-07-21 PROCEDURE — 25010000002 LORAZEPAM PER 2 MG: Performed by: EMERGENCY MEDICINE

## 2019-07-21 PROCEDURE — 96375 TX/PRO/DX INJ NEW DRUG ADDON: CPT

## 2019-07-21 PROCEDURE — 25010000002 DIMENHYDRINATE 50 MG/ML SOLUTION: Performed by: EMERGENCY MEDICINE

## 2019-07-21 PROCEDURE — 96376 TX/PRO/DX INJ SAME DRUG ADON: CPT

## 2019-07-21 PROCEDURE — 99284 EMERGENCY DEPT VISIT MOD MDM: CPT

## 2019-07-21 PROCEDURE — 85025 COMPLETE CBC W/AUTO DIFF WBC: CPT | Performed by: EMERGENCY MEDICINE

## 2019-07-21 PROCEDURE — 25010000002 DEXAMETHASONE PER 1 MG: Performed by: EMERGENCY MEDICINE

## 2019-07-21 PROCEDURE — 80053 COMPREHEN METABOLIC PANEL: CPT | Performed by: EMERGENCY MEDICINE

## 2019-07-21 PROCEDURE — 96374 THER/PROPH/DIAG INJ IV PUSH: CPT

## 2019-07-21 RX ORDER — DIMENHYDRINATE 50 MG/ML
25 INJECTION, SOLUTION INTRAMUSCULAR; INTRAVENOUS ONCE
Status: COMPLETED | OUTPATIENT
Start: 2019-07-21 | End: 2019-07-21

## 2019-07-21 RX ORDER — MECLIZINE HYDROCHLORIDE 25 MG/1
25 TABLET ORAL 3 TIMES DAILY PRN
Qty: 30 TABLET | Refills: 0 | Status: SHIPPED | OUTPATIENT
Start: 2019-07-21 | End: 2019-10-16

## 2019-07-21 RX ORDER — DIAZEPAM 5 MG/1
5 TABLET ORAL EVERY 8 HOURS PRN
Qty: 9 TABLET | Refills: 0 | Status: SHIPPED | OUTPATIENT
Start: 2019-07-21 | End: 2019-10-16

## 2019-07-21 RX ORDER — SODIUM CHLORIDE 0.9 % (FLUSH) 0.9 %
10 SYRINGE (ML) INJECTION AS NEEDED
Status: DISCONTINUED | OUTPATIENT
Start: 2019-07-21 | End: 2019-07-21 | Stop reason: HOSPADM

## 2019-07-21 RX ORDER — LORAZEPAM 2 MG/ML
INJECTION INTRAMUSCULAR
Status: COMPLETED
Start: 2019-07-21 | End: 2019-07-21

## 2019-07-21 RX ORDER — LORAZEPAM 2 MG/ML
1 INJECTION INTRAMUSCULAR ONCE
Status: COMPLETED | OUTPATIENT
Start: 2019-07-21 | End: 2019-07-21

## 2019-07-21 RX ORDER — DEXAMETHASONE SODIUM PHOSPHATE 4 MG/ML
8 INJECTION, SOLUTION INTRA-ARTICULAR; INTRALESIONAL; INTRAMUSCULAR; INTRAVENOUS; SOFT TISSUE ONCE
Status: COMPLETED | OUTPATIENT
Start: 2019-07-21 | End: 2019-07-21

## 2019-07-21 RX ORDER — LORAZEPAM 2 MG/ML
0.5 INJECTION INTRAMUSCULAR ONCE
Status: COMPLETED | OUTPATIENT
Start: 2019-07-21 | End: 2019-07-21

## 2019-07-21 RX ORDER — DIMENHYDRINATE 50 MG/ML
50 INJECTION, SOLUTION INTRAMUSCULAR; INTRAVENOUS ONCE
Status: COMPLETED | OUTPATIENT
Start: 2019-07-21 | End: 2019-07-21

## 2019-07-21 RX ADMIN — LORAZEPAM 0.5 MG: 2 INJECTION INTRAMUSCULAR at 09:45

## 2019-07-21 RX ADMIN — DIMENHYDRINATE 50 MG: 50 INJECTION, SOLUTION INTRAMUSCULAR; INTRAVENOUS at 09:21

## 2019-07-21 RX ADMIN — LORAZEPAM 0.5 MG: 2 INJECTION INTRAMUSCULAR; INTRAVENOUS at 12:42

## 2019-07-21 RX ADMIN — DEXAMETHASONE SODIUM PHOSPHATE 8 MG: 4 INJECTION, SOLUTION INTRAMUSCULAR; INTRAVENOUS at 09:21

## 2019-07-21 RX ADMIN — DIMENHYDRINATE 25 MG: 50 INJECTION, SOLUTION INTRAMUSCULAR; INTRAVENOUS at 12:38

## 2019-07-21 RX ADMIN — LORAZEPAM 0.5 MG: 2 INJECTION INTRAMUSCULAR; INTRAVENOUS at 09:45

## 2019-07-21 RX ADMIN — SODIUM CHLORIDE 1000 ML: 9 INJECTION, SOLUTION INTRAVENOUS at 09:22

## 2019-10-16 ENCOUNTER — HOSPITAL ENCOUNTER (INPATIENT)
Facility: HOSPITAL | Age: 39
LOS: 4 days | Discharge: HOME | End: 2019-10-20
Attending: EMERGENCY MEDICINE | Admitting: HOSPITALIST

## 2019-10-16 ENCOUNTER — APPOINTMENT (OUTPATIENT)
Dept: ULTRASOUND IMAGING | Facility: HOSPITAL | Age: 39
End: 2019-10-16

## 2019-10-16 ENCOUNTER — APPOINTMENT (OUTPATIENT)
Dept: CT IMAGING | Facility: HOSPITAL | Age: 39
End: 2019-10-16

## 2019-10-16 DIAGNOSIS — N23 RENAL COLIC ON LEFT SIDE: ICD-10-CM

## 2019-10-16 DIAGNOSIS — R52 INTRACTABLE PAIN: Primary | ICD-10-CM

## 2019-10-16 PROBLEM — K21.9 GERD WITHOUT ESOPHAGITIS: Status: ACTIVE | Noted: 2019-10-16

## 2019-10-16 PROBLEM — E03.9 HYPOTHYROID: Status: ACTIVE | Noted: 2019-10-16

## 2019-10-16 PROBLEM — A41.9 SEPSIS (HCC): Status: ACTIVE | Noted: 2019-10-16

## 2019-10-16 PROBLEM — R30.0 DYSURIA: Status: ACTIVE | Noted: 2019-10-16

## 2019-10-16 PROBLEM — N20.0 KIDNEY STONE: Status: ACTIVE | Noted: 2019-10-16

## 2019-10-16 LAB
ALBUMIN SERPL-MCNC: 4.5 G/DL (ref 3.5–5.2)
ALBUMIN/GLOB SERPL: 1.4 G/DL
ALP SERPL-CCNC: 64 U/L (ref 39–117)
ALT SERPL W P-5'-P-CCNC: 31 U/L (ref 1–33)
ANION GAP SERPL CALCULATED.3IONS-SCNC: 10 MMOL/L (ref 5–15)
AST SERPL-CCNC: 19 U/L (ref 1–32)
B-HCG UR QL: NEGATIVE
BACTERIA UR QL AUTO: ABNORMAL /HPF
BASOPHILS # BLD AUTO: 0.05 10*3/MM3 (ref 0–0.2)
BASOPHILS NFR BLD AUTO: 0.7 % (ref 0–1.5)
BILIRUB SERPL-MCNC: 0.2 MG/DL (ref 0.2–1.2)
BILIRUB UR QL STRIP: NEGATIVE
BUN BLD-MCNC: 12 MG/DL (ref 6–20)
BUN/CREAT SERPL: 14.6 (ref 7–25)
CALCIUM SPEC-SCNC: 9.5 MG/DL (ref 8.6–10.5)
CHLORIDE SERPL-SCNC: 105 MMOL/L (ref 98–107)
CLARITY UR: CLEAR
CO2 SERPL-SCNC: 26 MMOL/L (ref 22–29)
COLOR UR: YELLOW
CREAT BLD-MCNC: 0.82 MG/DL (ref 0.57–1)
DEPRECATED RDW RBC AUTO: 40.1 FL (ref 37–54)
EOSINOPHIL # BLD AUTO: 0.33 10*3/MM3 (ref 0–0.4)
EOSINOPHIL NFR BLD AUTO: 4.8 % (ref 0.3–6.2)
ERYTHROCYTE [DISTWIDTH] IN BLOOD BY AUTOMATED COUNT: 12.7 % (ref 12.3–15.4)
GFR SERPL CREATININE-BSD FRML MDRD: 78 ML/MIN/1.73
GLOBULIN UR ELPH-MCNC: 3.2 GM/DL
GLUCOSE BLD-MCNC: 102 MG/DL (ref 65–99)
GLUCOSE UR STRIP-MCNC: NEGATIVE MG/DL
HCT VFR BLD AUTO: 38.9 % (ref 34–46.6)
HGB BLD-MCNC: 12.5 G/DL (ref 12–15.9)
HGB UR QL STRIP.AUTO: NEGATIVE
HOLD SPECIMEN: NORMAL
HOLD SPECIMEN: NORMAL
HYALINE CASTS UR QL AUTO: ABNORMAL /LPF
IMM GRANULOCYTES # BLD AUTO: 0.02 10*3/MM3 (ref 0–0.05)
IMM GRANULOCYTES NFR BLD AUTO: 0.3 % (ref 0–0.5)
INTERNAL NEGATIVE CONTROL: NEGATIVE
INTERNAL POSITIVE CONTROL: POSITIVE
KETONES UR QL STRIP: NEGATIVE
LEUKOCYTE ESTERASE UR QL STRIP.AUTO: ABNORMAL
LYMPHOCYTES # BLD AUTO: 2.1 10*3/MM3 (ref 0.7–3.1)
LYMPHOCYTES NFR BLD AUTO: 30.4 % (ref 19.6–45.3)
Lab: NORMAL
MCH RBC QN AUTO: 28 PG (ref 26.6–33)
MCHC RBC AUTO-ENTMCNC: 32.1 G/DL (ref 31.5–35.7)
MCV RBC AUTO: 87.2 FL (ref 79–97)
MONOCYTES # BLD AUTO: 0.5 10*3/MM3 (ref 0.1–0.9)
MONOCYTES NFR BLD AUTO: 7.2 % (ref 5–12)
NEUTROPHILS # BLD AUTO: 3.9 10*3/MM3 (ref 1.7–7)
NEUTROPHILS NFR BLD AUTO: 56.6 % (ref 42.7–76)
NITRITE UR QL STRIP: NEGATIVE
NRBC BLD AUTO-RTO: 0 /100 WBC (ref 0–0.2)
PH UR STRIP.AUTO: 5.5 [PH] (ref 5–8)
PLATELET # BLD AUTO: 415 10*3/MM3 (ref 140–450)
PMV BLD AUTO: 9.6 FL (ref 6–12)
POTASSIUM BLD-SCNC: 3.9 MMOL/L (ref 3.5–5.2)
PROT SERPL-MCNC: 7.7 G/DL (ref 6–8.5)
PROT UR QL STRIP: NEGATIVE
RBC # BLD AUTO: 4.46 10*6/MM3 (ref 3.77–5.28)
RBC # UR: ABNORMAL /HPF
REF LAB TEST METHOD: ABNORMAL
SODIUM BLD-SCNC: 141 MMOL/L (ref 136–145)
SP GR UR STRIP: 1.01 (ref 1–1.03)
SQUAMOUS #/AREA URNS HPF: ABNORMAL /HPF
UROBILINOGEN UR QL STRIP: ABNORMAL
WBC NRBC COR # BLD: 6.9 10*3/MM3 (ref 3.4–10.8)
WBC UR QL AUTO: ABNORMAL /HPF
WHOLE BLOOD HOLD SPECIMEN: NORMAL
WHOLE BLOOD HOLD SPECIMEN: NORMAL

## 2019-10-16 PROCEDURE — 87088 URINE BACTERIA CULTURE: CPT | Performed by: INTERNAL MEDICINE

## 2019-10-16 PROCEDURE — 25010000002 CEFTRIAXONE PER 250 MG: Performed by: HOSPITALIST

## 2019-10-16 PROCEDURE — 87040 BLOOD CULTURE FOR BACTERIA: CPT | Performed by: HOSPITALIST

## 2019-10-16 PROCEDURE — 74176 CT ABD & PELVIS W/O CONTRAST: CPT

## 2019-10-16 PROCEDURE — 83615 LACTATE (LD) (LDH) ENZYME: CPT | Performed by: INTERNAL MEDICINE

## 2019-10-16 PROCEDURE — 25010000002 ONDANSETRON PER 1 MG: Performed by: EMERGENCY MEDICINE

## 2019-10-16 PROCEDURE — 25010000002 FENTANYL CITRATE (PF) 100 MCG/2ML SOLUTION: Performed by: EMERGENCY MEDICINE

## 2019-10-16 PROCEDURE — 87186 SC STD MICRODIL/AGAR DIL: CPT | Performed by: INTERNAL MEDICINE

## 2019-10-16 PROCEDURE — 87086 URINE CULTURE/COLONY COUNT: CPT | Performed by: INTERNAL MEDICINE

## 2019-10-16 PROCEDURE — 85025 COMPLETE CBC W/AUTO DIFF WBC: CPT | Performed by: EMERGENCY MEDICINE

## 2019-10-16 PROCEDURE — 81001 URINALYSIS AUTO W/SCOPE: CPT | Performed by: EMERGENCY MEDICINE

## 2019-10-16 PROCEDURE — 80053 COMPREHEN METABOLIC PANEL: CPT | Performed by: EMERGENCY MEDICINE

## 2019-10-16 PROCEDURE — 76830 TRANSVAGINAL US NON-OB: CPT

## 2019-10-16 PROCEDURE — 25010000002 KETOROLAC TROMETHAMINE PER 15 MG: Performed by: HOSPITALIST

## 2019-10-16 PROCEDURE — 93976 VASCULAR STUDY: CPT

## 2019-10-16 PROCEDURE — 80048 BASIC METABOLIC PNL TOTAL CA: CPT | Performed by: HOSPITALIST

## 2019-10-16 PROCEDURE — 25010000002 HYDROMORPHONE PER 4 MG: Performed by: EMERGENCY MEDICINE

## 2019-10-16 PROCEDURE — 25010000002 KETOROLAC TROMETHAMINE PER 15 MG: Performed by: EMERGENCY MEDICINE

## 2019-10-16 PROCEDURE — 25010000002 HYDROMORPHONE 1 MG/ML SOLUTION: Performed by: EMERGENCY MEDICINE

## 2019-10-16 PROCEDURE — 25010000002 HYDROMORPHONE PER 4 MG: Performed by: HOSPITALIST

## 2019-10-16 PROCEDURE — 99285 EMERGENCY DEPT VISIT HI MDM: CPT

## 2019-10-16 PROCEDURE — 99223 1ST HOSP IP/OBS HIGH 75: CPT | Performed by: HOSPITALIST

## 2019-10-16 PROCEDURE — 81025 URINE PREGNANCY TEST: CPT | Performed by: EMERGENCY MEDICINE

## 2019-10-16 PROCEDURE — 25010000002 ONDANSETRON PER 1 MG: Performed by: HOSPITALIST

## 2019-10-16 RX ORDER — HYDROMORPHONE HYDROCHLORIDE 1 MG/ML
0.5 INJECTION, SOLUTION INTRAMUSCULAR; INTRAVENOUS; SUBCUTANEOUS
Status: DISCONTINUED | OUTPATIENT
Start: 2019-10-16 | End: 2019-10-17

## 2019-10-16 RX ORDER — FAMOTIDINE 20 MG/1
40 TABLET, FILM COATED ORAL DAILY
Status: DISCONTINUED | OUTPATIENT
Start: 2019-10-16 | End: 2019-10-17

## 2019-10-16 RX ORDER — KETOROLAC TROMETHAMINE 30 MG/ML
15 INJECTION, SOLUTION INTRAMUSCULAR; INTRAVENOUS ONCE
Status: COMPLETED | OUTPATIENT
Start: 2019-10-16 | End: 2019-10-16

## 2019-10-16 RX ORDER — LISINOPRIL 5 MG/1
5 TABLET ORAL DAILY
Status: DISCONTINUED | OUTPATIENT
Start: 2019-10-16 | End: 2019-10-17

## 2019-10-16 RX ORDER — ACETAMINOPHEN 160 MG/5ML
650 SOLUTION ORAL EVERY 4 HOURS PRN
Status: DISCONTINUED | OUTPATIENT
Start: 2019-10-16 | End: 2019-10-17

## 2019-10-16 RX ORDER — ACETAMINOPHEN 325 MG/1
650 TABLET ORAL EVERY 4 HOURS PRN
Status: DISCONTINUED | OUTPATIENT
Start: 2019-10-16 | End: 2019-10-20 | Stop reason: HOSPADM

## 2019-10-16 RX ORDER — HYDROMORPHONE HYDROCHLORIDE 1 MG/ML
0.5 INJECTION, SOLUTION INTRAMUSCULAR; INTRAVENOUS; SUBCUTANEOUS ONCE
Status: COMPLETED | OUTPATIENT
Start: 2019-10-16 | End: 2019-10-16

## 2019-10-16 RX ORDER — METOPROLOL TARTRATE 5 MG/5ML
5 INJECTION INTRAVENOUS EVERY 6 HOURS PRN
Status: DISCONTINUED | OUTPATIENT
Start: 2019-10-16 | End: 2019-10-17

## 2019-10-16 RX ORDER — ONDANSETRON 2 MG/ML
4 INJECTION INTRAMUSCULAR; INTRAVENOUS ONCE
Status: COMPLETED | OUTPATIENT
Start: 2019-10-16 | End: 2019-10-16

## 2019-10-16 RX ORDER — SODIUM CHLORIDE 9 MG/ML
125 INJECTION, SOLUTION INTRAVENOUS CONTINUOUS
Status: DISCONTINUED | OUTPATIENT
Start: 2019-10-16 | End: 2019-10-17

## 2019-10-16 RX ORDER — ACETAMINOPHEN 650 MG/1
650 SUPPOSITORY RECTAL EVERY 4 HOURS PRN
Status: DISCONTINUED | OUTPATIENT
Start: 2019-10-16 | End: 2019-10-17

## 2019-10-16 RX ORDER — FENTANYL CITRATE 50 UG/ML
50 INJECTION, SOLUTION INTRAMUSCULAR; INTRAVENOUS ONCE
Status: COMPLETED | OUTPATIENT
Start: 2019-10-16 | End: 2019-10-16

## 2019-10-16 RX ORDER — LEVOTHYROXINE SODIUM 0.07 MG/1
75 TABLET ORAL
Status: DISCONTINUED | OUTPATIENT
Start: 2019-10-17 | End: 2019-10-17 | Stop reason: ALTCHOICE

## 2019-10-16 RX ORDER — LORAZEPAM 2 MG/ML
0.5 INJECTION INTRAMUSCULAR ONCE
Status: DISCONTINUED | OUTPATIENT
Start: 2019-10-17 | End: 2019-10-17

## 2019-10-16 RX ORDER — SODIUM CHLORIDE 0.9 % (FLUSH) 0.9 %
10 SYRINGE (ML) INJECTION EVERY 12 HOURS SCHEDULED
Status: DISCONTINUED | OUTPATIENT
Start: 2019-10-16 | End: 2019-10-17

## 2019-10-16 RX ORDER — SODIUM CHLORIDE 0.9 % (FLUSH) 0.9 %
10 SYRINGE (ML) INJECTION AS NEEDED
Status: DISCONTINUED | OUTPATIENT
Start: 2019-10-16 | End: 2019-10-17

## 2019-10-16 RX ORDER — KETOROLAC TROMETHAMINE 30 MG/ML
30 INJECTION, SOLUTION INTRAMUSCULAR; INTRAVENOUS EVERY 6 HOURS PRN
Status: DISCONTINUED | OUTPATIENT
Start: 2019-10-16 | End: 2019-10-17

## 2019-10-16 RX ORDER — OMEPRAZOLE 20 MG/1
20 CAPSULE, DELAYED RELEASE ORAL DAILY PRN
COMMUNITY
End: 2021-01-26

## 2019-10-16 RX ORDER — TAMSULOSIN HYDROCHLORIDE 0.4 MG/1
0.4 CAPSULE ORAL DAILY
Status: DISCONTINUED | OUTPATIENT
Start: 2019-10-16 | End: 2019-10-20 | Stop reason: HOSPADM

## 2019-10-16 RX ORDER — NALOXONE HCL 0.4 MG/ML
0.4 VIAL (ML) INJECTION
Status: DISCONTINUED | OUTPATIENT
Start: 2019-10-16 | End: 2019-10-17

## 2019-10-16 RX ORDER — IBUPROFEN 400 MG/1
400 TABLET ORAL EVERY 4 HOURS PRN
Status: DISCONTINUED | OUTPATIENT
Start: 2019-10-16 | End: 2019-10-17

## 2019-10-16 RX ORDER — ONDANSETRON 2 MG/ML
4 INJECTION INTRAMUSCULAR; INTRAVENOUS EVERY 6 HOURS PRN
Status: DISCONTINUED | OUTPATIENT
Start: 2019-10-16 | End: 2019-10-20 | Stop reason: HOSPADM

## 2019-10-16 RX ADMIN — FENTANYL CITRATE 25 MCG: 50 INJECTION INTRAMUSCULAR; INTRAVENOUS at 13:04

## 2019-10-16 RX ADMIN — HYDROMORPHONE HYDROCHLORIDE 0.5 MG: 1 INJECTION, SOLUTION INTRAMUSCULAR; INTRAVENOUS; SUBCUTANEOUS at 11:46

## 2019-10-16 RX ADMIN — HYDROMORPHONE HYDROCHLORIDE 0.5 MG: 1 INJECTION, SOLUTION INTRAMUSCULAR; INTRAVENOUS; SUBCUTANEOUS at 12:01

## 2019-10-16 RX ADMIN — SODIUM CHLORIDE 1000 ML: 9 INJECTION, SOLUTION INTRAVENOUS at 19:48

## 2019-10-16 RX ADMIN — SODIUM CHLORIDE 1000 ML: 9 INJECTION, SOLUTION INTRAVENOUS at 12:20

## 2019-10-16 RX ADMIN — HYDROMORPHONE HYDROCHLORIDE 0.5 MG: 1 INJECTION, SOLUTION INTRAMUSCULAR; INTRAVENOUS; SUBCUTANEOUS at 15:13

## 2019-10-16 RX ADMIN — HYDROMORPHONE HYDROCHLORIDE 0.5 MG: 1 INJECTION, SOLUTION INTRAMUSCULAR; INTRAVENOUS; SUBCUTANEOUS at 14:03

## 2019-10-16 RX ADMIN — CEFTRIAXONE 1 G: 1 INJECTION, POWDER, FOR SOLUTION INTRAMUSCULAR; INTRAVENOUS at 17:32

## 2019-10-16 RX ADMIN — HYDROMORPHONE HYDROCHLORIDE 0.5 MG: 1 INJECTION, SOLUTION INTRAMUSCULAR; INTRAVENOUS; SUBCUTANEOUS at 15:36

## 2019-10-16 RX ADMIN — KETOROLAC TROMETHAMINE 15 MG: 30 INJECTION, SOLUTION INTRAMUSCULAR at 11:46

## 2019-10-16 RX ADMIN — TAMSULOSIN HYDROCHLORIDE 0.4 MG: 0.4 CAPSULE ORAL at 18:03

## 2019-10-16 RX ADMIN — ONDANSETRON 4 MG: 2 INJECTION INTRAMUSCULAR; INTRAVENOUS at 21:40

## 2019-10-16 RX ADMIN — HYDROMORPHONE HYDROCHLORIDE 0.5 MG: 1 INJECTION, SOLUTION INTRAMUSCULAR; INTRAVENOUS; SUBCUTANEOUS at 18:03

## 2019-10-16 RX ADMIN — ACETAMINOPHEN 650 MG: 325 TABLET ORAL at 19:48

## 2019-10-16 RX ADMIN — FAMOTIDINE 40 MG: 20 TABLET ORAL at 18:03

## 2019-10-16 RX ADMIN — HYDROMORPHONE HYDROCHLORIDE 0.5 MG: 1 INJECTION, SOLUTION INTRAMUSCULAR; INTRAVENOUS; SUBCUTANEOUS at 17:29

## 2019-10-16 RX ADMIN — KETOROLAC TROMETHAMINE 30 MG: 30 INJECTION, SOLUTION INTRAMUSCULAR at 18:01

## 2019-10-16 RX ADMIN — ONDANSETRON 4 MG: 2 INJECTION INTRAMUSCULAR; INTRAVENOUS at 13:02

## 2019-10-16 RX ADMIN — ONDANSETRON 4 MG: 2 INJECTION INTRAMUSCULAR; INTRAVENOUS at 11:45

## 2019-10-16 NOTE — H&P
"    [facility] Medicine Services  HISTORY AND PHYSICAL    Patient Name: Brandy Garcia  : 1980  MRN: [id number]  Primary Care Physician: Emily Solano APRN  Date of admission: 10/16/2019      Subjective   Subjective     Chief Complaint:  LLQ/Lt flank pain    HPI:  Brandy Garcia is a 39 y.o. 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 She stated that she had not been feeling well over this past week. She stated that she was having intermittent LLQ abd pain, but \"was not bad,\" in addition to dysuria/urinary urgency, so she went to the urgent care center, and was diagnosed with UTI. She was prescribed Macrobid. She also took some Pyridium and initially reported that her she got mild relief of her symptoms, but today, while at work, she felt sudden severe, sharp pain in the left flank/lower back area that was sharp and constant. She denies any fever or chills, but still reports uriary urgency/dysuria. Denies any chest pain, cough, SOA, hematuria, or diarrhea.    Basic labs were unremarkable. CT A/P showed 2 calcifications in te left pelvis, appx 10mm and 2.5mm, which appears to be external to the ureter per report. There is also a 3mm left ureteral stone noted with mild hydronephrosis. US abd was also performed to rule out ovarian torsion, which was negative.    Despite significant analgesics, pt was still having intractable pain, so we are asked to admit pt and ED provider have consulted urology( Dr Dang notified).      Review of Systems   All other systems reviewed and are negative.     Personal History     Past Medical History:   Diagnosis Date   • Anxiety    • Depression    • Disease of thyroid gland    • GERD (gastroesophageal reflux disease)    • Kidney stone        Past Surgical History:   Procedure Laterality Date   • CHOLECYSTECTOMY     • LEEP         Family History:  Pertinent FHx was reviewed and unremarkable.     Social History:  reports that " she quit smoking about 6 years ago. She does not have any smokeless tobacco history on file. She reports that she drinks about 1.8 - 3.6 oz of alcohol per week. She reports that she does not use drugs.      Medications:    Available home medication information reviewed.      Allergies   Allergen Reactions   • Prednisone Other (See Comments)     Makes her crazy    • Brethaire [Terbutaline]    • Zithromax [Azithromycin] GI Intolerance   • Amoxicillin Rash       Objective   Objective     Vital Signs:   Temp:  [98.6 °F (37 °C)] 98.6 °F (37 °C)  Heart Rate:  [77-93] 77  Resp:  [14-18] 14  BP: (136-144)/(77-87) 137/77        Physical Exam   General Assessment: No acute cardiopulmonary distress. Well developed and well nourished.    HEENT: NCAT, PERRL, MM moist    Neck: Supple    CVS: RRR, S1S2 normal, no murmurs    Resp: CTAB, no adventitious sound    Abd: soft, NT, ND, normal BS, no guarding or peritoneal signs; no CVA tenderness    Ext: Trace edema, both calves are symmetric and NTTP    Neuro: Nonfocal    Skin: W/D/I. No rash.    Psych: Affect is appropriate    Results Reviewed:  I have personally reviewed current lab and radiology data.    Results from last 7 days   Lab Units 10/16/19  1136   WBC 10*3/mm3 6.90   HEMOGLOBIN g/dL 12.5   HEMATOCRIT % 38.9   PLATELETS 10*3/mm3 415     Results from last 7 days   Lab Units 10/16/19  1136   SODIUM mmol/L 141   POTASSIUM mmol/L 3.9   CHLORIDE mmol/L 105   CO2 mmol/L 26.0   BUN mg/dL 12   CREATININE mg/dL 0.82   GLUCOSE mg/dL 102*   CALCIUM mg/dL 9.5   ALT (SGPT) U/L 31   AST (SGOT) U/L 19     Estimated Creatinine Clearance: 119.5 mL/min (by C-G formula based on SCr of 0.82 mg/dL).  Brief Urine Lab Results  (Last result in the past 365 days)      Color   Clarity   Blood   Leuk Est   Nitrite   Protein   CREAT   Urine HCG        10/16/19 1135               Negative         Imaging Results (last 24 hours)     Procedure Component Value Units Date/Time    US Non-ob Transvaginal  [[id number]] Collected:  10/16/19 1603     Updated:  10/16/19 1646    Narrative:       EXAMINATION: US NON-OB TRANSVAGINAL, US TESTICULAR OR OVARIAN VASCULAR  LIMITED - 10/16/2019     INDICATION: Left adnexal pain x 1 week.     TECHNIQUE: Duplex interrogation is be performed to the ovaries to  visualize both arterial and venous waveforms.     COMPARISON: 11/20/2018 transvaginal ultrasound.     FINDINGS: Multiple small nabothian cysts are seen in the cervix. The  uterus measures approximately 9.7 x 5.2 x 5.8 cm and is otherwise  homogeneous in echotexture. No uterine mass is identified. The  endometrial echo complex is generally uniform and normal in appearance  at approximately 8 mm in width.     The right ovary measures approximately 4.3 x 2.1 x 2.5 cm and contains  multiple small follicles. There is normal arterial waveform and color  Doppler flow.     Left ovary measures approximately 3.4 x 1.9 x 2.3 cm and contains  multiple small follicles. There is normal arterial waveform and color  Doppler flow. No adnexal mass or significant intrapelvic free fluid is  seen.       Impression:       Incidentally noted nabothian cysts. Otherwise negative  pelvic ultrasound. Uterus and ovaries appear within normal limits. In  particular, no visible left ovarian cyst or evidence of torsion.      DICTATED:   10/16/2019  EDITED/ls :   10/16/2019        US Testicular or Ovarian Vascular Limited [[id number]] Collected:  10/16/19 1603     Updated:  10/16/19 1646    Narrative:       EXAMINATION: US NON-OB TRANSVAGINAL, US TESTICULAR OR OVARIAN VASCULAR  LIMITED - 10/16/2019     INDICATION: Left adnexal pain x 1 week.     TECHNIQUE: Duplex interrogation is be performed to the ovaries to  visualize both arterial and venous waveforms.     COMPARISON: 11/20/2018 transvaginal ultrasound.     FINDINGS: Multiple small nabothian cysts are seen in the cervix. The  uterus measures approximately 9.7 x 5.2 x 5.8 cm and is otherwise  homogeneous in  echotexture. No uterine mass is identified. The  endometrial echo complex is generally uniform and normal in appearance  at approximately 8 mm in width.     The right ovary measures approximately 4.3 x 2.1 x 2.5 cm and contains  multiple small follicles. There is normal arterial waveform and color  Doppler flow.     Left ovary measures approximately 3.4 x 1.9 x 2.3 cm and contains  multiple small follicles. There is normal arterial waveform and color  Doppler flow. No adnexal mass or significant intrapelvic free fluid is  seen.       Impression:       Incidentally noted nabothian cysts. Otherwise negative  pelvic ultrasound. Uterus and ovaries appear within normal limits. In  particular, no visible left ovarian cyst or evidence of torsion.      DICTATED:   10/16/2019  EDITED/ls :   10/16/2019        CT Abdomen Pelvis Without Contrast [[id number]] Collected:  10/16/19 1319     Updated:  10/16/19 1328    Narrative:       EXAMINATION: CT ABDOMEN AND PELVIS WO CONTRAST-10/16/2019:      INDICATION: Left flank pain.     TECHNIQUE: 5 mm unenhanced images through the abdomen and pelvis.     The radiation dose reduction device was turned on for each scan per the  ALARA (As Low as Reasonably Achievable) protocol.     COMPARISON: 08/08/2018.     FINDINGS: The included lower lungs appear grossly clear. No significant  abnormalities are noted of the liver, spleen, pancreas, adrenal glands,  or right kidney. The gallbladder is surgically absent. There is slight  indistinctness of the left renal outline and mild dilatation of the  collecting system. There is a 3 mm nonobstructing lower pole calculus.     There is dilatation of the left renal collecting system and ureter down  to the level of the distal left ureter. There are 2 calcifications at  this level, an approximately 2.5 mm calculus that appears to be external  to the ureter, and a 10 mm calculus, very closely associated with the  left ureter, however, these are both present  on the 2018 exam  where there is no obstructive uropathy, and where they appear adjacent  to but distinct from the distal left ureter. Accordingly, their precise  location is uncertain.     The bladder is decompressed. The uterus and ovaries are not enlarged. No  mass or adenopathy is seen. Large and small bowel loops appear grossly  normal.       Impression:       1.  Mild left-sided obstructive uropathy, and 3 mm nonobstructing left  renal calculus.  2.  Two calcifications in the left pelvis, 10 mm and 2.5 mm in diameter,  both of which were present on the 2018 exam and appeared external  to the ureter. The larger calcification appears very closely associated  with the left ureter today, but it is questionable whether this is  actually intraureteral. Consider evaluation by urology.  3.  No other evidence of acute intra-abdominal or intrapelvic disease is  seen.     D:  10/16/2019  E:  10/16/2019              Results for orders placed during the hospital encounter of 07/21/15   CONVERTED (HISTORICAL) ECHO    Narrative Patient:      TIM MARLOW    [facility] Rec#:     [id number]               :          1980            Date:         2015            Age:          35y                   Height:       170.18 cm / 67.0 in  Weight:       117.94 kg / 259.9 lbs  Sex:          F                     BSA:          2.26  Room#:        ER 14                     Sonographer:  Sadia Green,RDCS,RVS  Referring:    RACHAEL  Reading:      Branden Jacobsen MD  Primary:      NO PRIMARY CARE PHYSICIAN  Unit:         Emergency Dept.  ______________________________________________________________________    Transthoracic Echocardiogram    Indication:  dyspnea  BP:           127/92  HR:           105  Rhythm:       Tachycardyia    Conclusions  1. The estimated ejection fraction is greater than 65%.   2. Normal left ventricular diastolic filling is observed.  3. The inferior vena cava appears normal in size.IVC is  collapsed at  rest-consistent with decreased intravascular volume.    Findings       Technical Comments:  The study quality is good.      Left Ventricle:  The left ventricular chamber size is normal. The estimated ejection  fraction is greater than 65%.  Normal left ventricular diastolic filling  is observed.     Left Atrium:  The left atrial chamber size is normal.     Right Ventricle:  The right ventricular cavity size is normal. The right ventricular  global systolic function is normal.     Right Atrium:  The right atrial cavity size is normal. No atrial septal defect is  visualized.     Aortic Valve:  The aortic valve is trileaflet. There is no evidence of aortic  regurgitation. There is no evidence of aortic stenosis.     Mitral Valve:  The mitral valve leaflets appear normal. There is no evidence of mitral  valve prolapse. There is a trace of mitral regurgitation. There is no  evidence of mitral stenosis.     Tricuspid Valve:  The tricuspid valve leaflets are normal.  There is a physiological  tricuspid regurgitation.  Unable to estimate the right ventricular  systolic pressure.     Pulmonic Valve:  The pulmonic valve appears normal. There is a trace pulmonic  regurgitation.      Pericardium:  There is no evidence of pericardial effusion.     Aorta:  There is no dilatation of the aortic root.     Pulmonary Artery:  The main pulmonary artery appears normal.     Venous:  The venous system appears normal. The inferior vena cava appears normal  in size.IVC is collapsed at rest-consistent with decreased intravascular  volume. There is a greater than 50% respiratory change in the inferior  vena cava dimension. The flow pattern of the inferior vena cava appears  normal.     Measurements   Chambers  Name                    Value           Ao root diameter (MM)   2.6 cm          LA dimension (AP) MM    3.3 cm          LA:Ao ratio (MM)        1.27 ratio      RVIDd (AP) 2D           1.78 cm         IVSd (2D)                1.12 cm         LVIDd (2D)              3.37 cm         LVIDs (2D)              2.68 cm         LVPWd (2D)              0.97 cm         Ao root diameter (2D)   2.6 cm          LA dimension (AP) 2D    2.9 cm          LA:Ao ratio (2D)        1.12 ratio        Volumes  Name                    Value           LA ESV SP 4CH (MOD)     18 ml           LA ESV SP 2CH (MOD)     16 ml           LA ESV BP (MOD)         17 ml           LA ESV BP (MOD) index   7.5 ml/m2         Diastolic/Systolic Function  Name                    Value           MV E-wave Vmax          0.88 m/sec      MV A-wave Vmax          0.72 m/sec      MV E:A ratio            1.2 ratio       LV septal e' Vmax       0.07 m/sec      LV lateral e' Vmax      0.12 m/sec      LV E:e' septal ratio    12.8 ratio      LV E:e' lateral ratio   7.4 ratio         Aortic Valve  Name                    Value           AV Vmax                 1.81 m/sec      AV VTI                  32.2 cm         AV peak gradient        13 mmHg         AV mean gradient        7 mmHg          LVOT Vmax               1.13 m/sec      LVOT VTI                20.5 cm         LVOT peak gradient      5 mmHg          LVOT mean gradient      3 mmHg            Pulmonic Valve/Qp:Qs  Name                    Value           PV Vmax                 1.3 m/sec       PV peak gradient        7 mmHg          PV acceleration time    123 msec          Electronically signed by: Branden Jacobsen MD on 07/21/2015 17:43:39       Assessment/Plan   Assessment / Plan     Active Hospital Problems    Diagnosis POA   • Intractable pain [R52] Yes   • Kidney stone [N20.0] Unknown   • GERD without esophagitis [K21.9] Unknown   • Hypothyroid [E03.9] Unknown   • Dysuria [R30.0] Unknown       Intractable flank/LLQ pain  Left ureteral stone with mild hydronephrosis/no KIKE  - Pain control Toradol/Dilaudid PRN   - Flomax   - Cont IVF  - Strain urine  - Urology consulted, Dr Dang notified by ED provider    Dysuria/urinary urgency  -  Pt reported that she was diagnosed with UTI at  and prescribed Macrobid; in addition, she has been taking Pyridium  - Ucx here relatively unremarkable, but was modified by previous abx, so will do empiric Rocephin given that pt is having UTI symptoms  - Obtain record from     Hypothyroidism  - Cont home Synthroid    HTN  - Well controlled at this time, cont home Lisinopril    DVT prophylaxis:  SCD    CODE STATUS:    Code Status and Medical Interventions:   Ordered at: 10/16/19 6313     Level Of Support Discussed With:    Patient     Code Status:    CPR     Medical Interventions (Level of Support Prior to Arrest):    Full       Admission Status:  I believe this patient meets OBSERVATION status, however if further evaluation or treatment plans warrant, status may change.  Based upon current information, I predict patient's care encounter to be less than or equal to 2 midnights.        Electronically signed by Lara Anne MD, 10/16/19, 5:05 PM.

## 2019-10-16 NOTE — ED PROVIDER NOTES
Subjective   Ms. Garcia is a 39-year-old female that comes to the emergency room today complaining of having increasing left flank pain.  Patient's been having episodes off and on for the past couple days but seems to have significantly gotten worse.  Patient reports that the pain is now radiating back into her left flank.  Patient reports she is been having some frequency and urgency.  She denies any hematuria.  Patient states she did dip her urine and it looks like that it did dip positive for blood.  Patient reports that she is been on oral antibiotics for recent UTI.  Patient denies any fevers chills or Reiger's associated with this.  States she has no other complaints.        History provided by:  Patient   used: No    Flank Pain   Pain location:  LLQ and L flank  Pain quality: aching, gnawing and throbbing    Pain radiates to:  L flank  Pain severity:  Severe  Onset quality:  Gradual  Duration:  3 days  Timing:  Intermittent  Progression:  Worsening  Chronicity:  Recurrent  Context: not awakening from sleep, not diet changes, not eating, not laxative use, not recent illness, not recent sexual activity, not recent travel, not sick contacts, not suspicious food intake and not trauma    Relieved by:  Nothing  Worsened by:  Nothing  Ineffective treatments:  None tried  Associated symptoms: nausea and vomiting    Associated symptoms: no anorexia, no chest pain, no chills, no cough, no diarrhea, no fever, no hematemesis, no hematochezia and no hematuria    Risk factors: no alcohol abuse, has not had multiple surgeries, no NSAID use, not pregnant and no recent hospitalization        Review of Systems   Constitutional: Negative for chills and fever.   Respiratory: Negative for cough, chest tightness and stridor.    Cardiovascular: Negative for chest pain, palpitations and leg swelling.   Gastrointestinal: Positive for abdominal pain, nausea and vomiting. Negative for anorexia, diarrhea,  hematemesis and hematochezia.   Genitourinary: Positive for flank pain. Negative for hematuria.   Hematological: Negative for adenopathy.   Psychiatric/Behavioral: Negative.    All other systems reviewed and are negative.      Past Medical History:   Diagnosis Date   • Anxiety    • Depression    • Disease of thyroid gland    • GERD (gastroesophageal reflux disease)    • Kidney stone        Allergies   Allergen Reactions   • Prednisone Other (See Comments)     Makes her crazy    • Brethaire [Terbutaline]    • Zithromax [Azithromycin] GI Intolerance   • Amoxicillin Rash       Past Surgical History:   Procedure Laterality Date   • CHOLECYSTECTOMY     • LEEP         History reviewed. No pertinent family history.    Social History     Socioeconomic History   • Marital status:      Spouse name: Not on file   • Number of children: Not on file   • Years of education: Not on file   • Highest education level: Not on file   Tobacco Use   • Smoking status: Former Smoker     Last attempt to quit: 2013     Years since quittin.6   Substance and Sexual Activity   • Alcohol use: Yes     Alcohol/week: 1.8 - 3.6 oz     Types: 1 - 2 Glasses of wine, 1 - 2 Cans of beer, 1 - 2 Shots of liquor per week     Comment: OCCAS   • Drug use: No   • Sexual activity: Defer           Objective   Physical Exam   Constitutional: She is oriented to person, place, and time. She appears well-developed and well-nourished. No distress.   Diaphoretic pale rocking back and forth appears miserable but nontoxic   HENT:   Head: Normocephalic and atraumatic.   Nose: Nose normal.   Eyes: Conjunctivae are normal. No scleral icterus.   Neck: Normal range of motion. Neck supple.   Cardiovascular: Normal rate, regular rhythm, normal heart sounds and intact distal pulses.   No murmur heard.  Pulmonary/Chest: Effort normal and breath sounds normal. No respiratory distress.   Abdominal: Soft. Bowel sounds are normal. There is no hepatosplenomegaly,  splenomegaly or hepatomegaly. There is no tenderness. There is no rigidity, no rebound, no guarding, no CVA tenderness, no tenderness at McBurney's point and negative Rosen's sign. No hernia. Hernia confirmed negative in the ventral area, confirmed negative in the right inguinal area and confirmed negative in the left inguinal area.   Cannot re-create her abdominal pain with palpation.   Musculoskeletal: Normal range of motion.   Neurological: She is alert and oriented to person, place, and time.   Skin: Skin is warm and dry. She is not diaphoretic.   Psychiatric: She has a normal mood and affect. Her behavior is normal.   Nursing note and vitals reviewed.      Procedures           ED Course  ED Course as of Oct 17 0740   Wed Oct 16, 2019   1602 Went over the ultrasound with the patient.  Good blood flow was seen.  I discussed the patient with Dr. Dang, he has me to call the office to make sure that it is up on his list for tomorrow.  [KAMAR]   1602 Spoke to Dr. Anne she will admit the patient.  [KAMAR]      ED Course User Index  [KAMAR] Jhonatan Bello PA      Recent Results (from the past 24 hour(s))   Urinalysis With Microscopic If Indicated (No Culture) - Urine, Clean Catch    Collection Time: 10/16/19 11:28 AM   Result Value Ref Range    Color, UA Yellow Yellow, Straw    Appearance, UA Clear Clear    pH, UA 5.5 5.0 - 8.0    Specific Gravity, UA 1.009 1.001 - 1.030    Glucose, UA Negative Negative    Ketones, UA Negative Negative    Bilirubin, UA Negative Negative    Blood, UA Negative Negative    Protein, UA Negative Negative    Leuk Esterase, UA Small (1+) (A) Negative    Nitrite, UA Negative Negative    Urobilinogen, UA 0.2 E.U./dL 0.2 - 1.0 E.U./dL   Urinalysis, Microscopic Only - Urine, Clean Catch    Collection Time: 10/16/19 11:28 AM   Result Value Ref Range    RBC, UA 0-2 None Seen, 0-2 /HPF    WBC, UA 6-12 (A) None Seen, 0-2 /HPF    Bacteria, UA None Seen None Seen, Trace /HPF    Squamous  Epithelial Cells, UA 0-2 None Seen, 0-2 /HPF    Hyaline Casts, UA 0-6 0 - 6 /LPF    Methodology Automated Microscopy    POCT Pregnancy, Urine    Collection Time: 10/16/19 11:35 AM   Result Value Ref Range    HCG, Urine, QL Negative Negative    Lot Number [id number]     Internal Positive Control Positive     Internal Negative Control Negative    Comprehensive Metabolic Panel    Collection Time: 10/16/19 11:36 AM   Result Value Ref Range    Glucose 102 (H) 65 - 99 mg/dL    BUN 12 6 - 20 mg/dL    Creatinine 0.82 0.57 - 1.00 mg/dL    Sodium 141 136 - 145 mmol/L    Potassium 3.9 3.5 - 5.2 mmol/L    Chloride 105 98 - 107 mmol/L    CO2 26.0 22.0 - 29.0 mmol/L    Calcium 9.5 8.6 - 10.5 mg/dL    Total Protein 7.7 6.0 - 8.5 g/dL    Albumin 4.50 3.50 - 5.20 g/dL    ALT (SGPT) 31 1 - 33 U/L    AST (SGOT) 19 1 - 32 U/L    Alkaline Phosphatase 64 39 - 117 U/L    Total Bilirubin 0.2 0.2 - 1.2 mg/dL    eGFR Non African Amer 78 >60 mL/min/1.73    Globulin 3.2 gm/dL    A/G Ratio 1.4 g/dL    BUN/Creatinine Ratio 14.6 7.0 - 25.0    Anion Gap 10.0 5.0 - 15.0 mmol/L   CBC Auto Differential    Collection Time: 10/16/19 11:36 AM   Result Value Ref Range    WBC 6.90 3.40 - 10.80 10*3/mm3    RBC 4.46 3.77 - 5.28 10*6/mm3    Hemoglobin 12.5 12.0 - 15.9 g/dL    Hematocrit 38.9 34.0 - 46.6 %    MCV 87.2 79.0 - 97.0 fL    MCH 28.0 26.6 - 33.0 pg    MCHC 32.1 31.5 - 35.7 g/dL    RDW 12.7 12.3 - 15.4 %    RDW-SD 40.1 37.0 - 54.0 fl    MPV 9.6 6.0 - 12.0 fL    Platelets 415 140 - 450 10*3/mm3    Neutrophil % 56.6 42.7 - 76.0 %    Lymphocyte % 30.4 19.6 - 45.3 %    Monocyte % 7.2 5.0 - 12.0 %    Eosinophil % 4.8 0.3 - 6.2 %    Basophil % 0.7 0.0 - 1.5 %    Immature Grans % 0.3 0.0 - 0.5 %    Neutrophils, Absolute 3.90 1.70 - 7.00 10*3/mm3    Lymphocytes, Absolute 2.10 0.70 - 3.10 10*3/mm3    Monocytes, Absolute 0.50 0.10 - 0.90 10*3/mm3    Eosinophils, Absolute 0.33 0.00 - 0.40 10*3/mm3    Basophils, Absolute 0.05 0.00 - 0.20 10*3/mm3    Immature  Grans, Absolute 0.02 0.00 - 0.05 10*3/mm3    nRBC 0.0 0.0 - 0.2 /100 WBC   Light Blue Top    Collection Time: 10/16/19 11:36 AM   Result Value Ref Range    Extra Tube hold for add-on    Green Top (Gel)    Collection Time: 10/16/19 11:36 AM   Result Value Ref Range    Extra Tube Hold for add-ons.    Lavender Top    Collection Time: 10/16/19 11:36 AM   Result Value Ref Range    Extra Tube hold for add-on    Gold Top - SST    Collection Time: 10/16/19 11:36 AM   Result Value Ref Range    Extra Tube Hold for add-ons.    Basic Metabolic Panel    Collection Time: 10/16/19  8:28 PM   Result Value Ref Range    Glucose 93 65 - 99 mg/dL    BUN 13 6 - 20 mg/dL    Creatinine 1.09 (H) 0.57 - 1.00 mg/dL    Sodium 139 136 - 145 mmol/L    Potassium 4.0 3.5 - 5.2 mmol/L    Chloride 106 98 - 107 mmol/L    CO2 21.0 (L) 22.0 - 29.0 mmol/L    Calcium 8.8 8.6 - 10.5 mg/dL    eGFR Non African Amer 56 (L) >60 mL/min/1.73    BUN/Creatinine Ratio 11.9 7.0 - 25.0    Anion Gap 12.0 5.0 - 15.0 mmol/L   Lactate Dehydrogenase    Collection Time: 10/16/19  8:28 PM   Result Value Ref Range     (H) 135 - 214 U/L   CBC Auto Differential    Collection Time: 10/17/19  3:19 AM   Result Value Ref Range    WBC 19.50 (H) 3.40 - 10.80 10*3/mm3    RBC 3.73 (L) 3.77 - 5.28 10*6/mm3    Hemoglobin 10.4 (L) 12.0 - 15.9 g/dL    Hematocrit 32.8 (L) 34.0 - 46.6 %    MCV 87.9 79.0 - 97.0 fL    MCH 27.9 26.6 - 33.0 pg    MCHC 31.7 31.5 - 35.7 g/dL    RDW 12.9 12.3 - 15.4 %    RDW-SD 41.4 37.0 - 54.0 fl    MPV 9.4 6.0 - 12.0 fL    Platelets 276 140 - 450 10*3/mm3   Lactic Acid, Plasma    Collection Time: 10/17/19  3:19 AM   Result Value Ref Range    Lactate 2.0 0.5 - 2.0 mmol/L   Manual Differential    Collection Time: 10/17/19  3:19 AM   Result Value Ref Range    Neutrophil % 72.0 42.7 - 76.0 %    Lymphocyte % 3.0 (L) 19.6 - 45.3 %    Monocyte % 3.0 (L) 5.0 - 12.0 %    Eosinophil % 0.0 (L) 0.3 - 6.2 %    Basophil % 0.0 0.0 - 1.5 %    Bands %  22.0 (H) 0.0 -  5.0 %    Neutrophils Absolute 18.33 (H) 1.70 - 7.00 10*3/mm3    Lymphocytes Absolute 0.59 (L) 0.70 - 3.10 10*3/mm3    Monocytes Absolute 0.59 0.10 - 0.90 10*3/mm3    Eosinophils Absolute 0.00 0.00 - 0.40 10*3/mm3    Basophils Absolute 0.00 0.00 - 0.20 10*3/mm3    RBC Morphology Normal Normal    WBC Morphology Normal Normal    Platelet Morphology Normal Normal   Procalcitonin    Collection Time: 10/17/19  4:47 AM   Result Value Ref Range    Procalcitonin 0.88 (H) 0.10 - 0.25 ng/mL   Cortisol    Collection Time: 10/17/19  4:47 AM   Result Value Ref Range    Cortisol 29.24   mcg/dL   TSH    Collection Time: 10/17/19  4:47 AM   Result Value Ref Range    TSH 3.680 0.270 - 4.200 uIU/mL   T4, Free    Collection Time: 10/17/19  4:47 AM   Result Value Ref Range    Free T4 1.50 0.93 - 1.70 ng/dL   Magnesium    Collection Time: 10/17/19  4:47 AM   Result Value Ref Range    Magnesium 1.6 1.6 - 2.6 mg/dL   Phosphorus    Collection Time: 10/17/19  4:47 AM   Result Value Ref Range    Phosphorus 3.2 2.5 - 4.5 mg/dL   CBC (No Diff)    Collection Time: 10/17/19  5:37 AM   Result Value Ref Range    WBC 19.14 (H) 3.40 - 10.80 10*3/mm3    RBC 3.68 (L) 3.77 - 5.28 10*6/mm3    Hemoglobin 10.2 (L) 12.0 - 15.9 g/dL    Hematocrit 31.9 (L) 34.0 - 46.6 %    MCV 86.7 79.0 - 97.0 fL    MCH 27.7 26.6 - 33.0 pg    MCHC 32.0 31.5 - 35.7 g/dL    RDW 12.9 12.3 - 15.4 %    RDW-SD 40.9 37.0 - 54.0 fl    MPV 9.8 6.0 - 12.0 fL    Platelets 265 140 - 450 10*3/mm3     Note: In addition to lab results from this visit, the labs listed above may include labs taken at another facility or during a different encounter within the last 24 hours. Please correlate lab times with ED admission and discharge times for further clarification of the services performed during this visit.    XR Chest 1 View   Final Result   No acute cardiopulmonary findings.      Signer Name: Severo Licona MD    Signed: 10/17/2019 1:32 AM    Workstation Name: [id number]     Radiology  [facility]      US Non-ob Transvaginal   Final Result   Incidentally noted nabothian cysts. Otherwise negative   pelvic ultrasound. Uterus and ovaries appear within normal limits. In   particular, no visible left ovarian cyst or evidence of torsion.        DICTATED:   10/16/2019   EDITED/ls :   10/16/2019        This report was finalized on 10/16/2019 10:35 PM by DR. Onesimo Cantu MD.          US Testicular or Ovarian Vascular Limited   Final Result   Incidentally noted nabothian cysts. Otherwise negative   pelvic ultrasound. Uterus and ovaries appear within normal limits. In   particular, no visible left ovarian cyst or evidence of torsion.        DICTATED:   10/16/2019   EDITED/ls :   10/16/2019        This report was finalized on 10/16/2019 10:35 PM by DR. Onesimo Cantu MD.          CT Abdomen Pelvis Without Contrast   Final Result   1.  Mild left-sided obstructive uropathy, and 3 mm nonobstructing left   renal calculus.   2.  Two calcifications in the left pelvis, 10 mm and 2.5 mm in diameter,   both of which were present on the 08/08/2018 exam and appeared external   to the ureter. The larger calcification appears very closely associated   with the left ureter today, but it is questionable whether this is   actually intraureteral. Consider evaluation by urology.   3.  No other evidence of acute intra-abdominal or intrapelvic disease is   seen.       D:  10/16/2019   E:  10/16/2019       This report was finalized on 10/16/2019 9:56 PM by DR. Onesimo Cantu MD.            Vitals:    10/17/19 0615 10/17/19 0630 10/17/19 0645 10/17/19 0700   BP: 97/69 95/60 104/66 110/61   BP Location:       Patient Position:       Pulse: 106 102 98 94   Resp:    18   Temp:       TempSrc:       SpO2: 95% 97% 97% 96%   Weight:       Height:         Medications   lidocaine (XYLOCAINE) 1 % 150 mg in sodium chloride 0.9 % 250 mL IVPB (150 mg Intravenous Not Given 10/16/19 1700)   lisinopril (PRINIVIL,ZESTRIL) tablet 5 mg (5 mg  Oral Not Given 10/16/19 1803)   sodium chloride 0.9 % infusion (125 mL/hr Intravenous New Bag 10/17/19 0457)   acetaminophen (TYLENOL) tablet 650 mg (650 mg Oral Given 10/17/19 0127)   ondansetron (ZOFRAN) injection 4 mg (4 mg Intravenous Given 10/17/19 0457)   tamsulosin (FLOMAX) 24 hr capsule 0.4 mg (0.4 mg Oral Given 10/16/19 1803)   LORazepam (ATIVAN) injection 0.5 mg (0.5 mg Intravenous Not Given 10/17/19 0135)   meropenem (MERREM) 1 g/100 mL 0.9% NS VTB (mbp) (not administered)   HYDROmorphone (DILAUDID) injection 0.5 mg (not administered)   pantoprazole (PROTONIX) EC tablet 40 mg (40 mg Oral Given 10/17/19 0655)   levothyroxine sodium (TIROSINT) capsule 75 mcg **BRAND ONLY  - PATIENT SUPPLIED** (75 mcg Oral Given 10/17/19 0726)   sodium chloride 0.9 % bolus 1,000 mL (0 mL Intravenous Stopped 10/16/19 1404)   ketorolac (TORADOL) injection 15 mg (15 mg Intravenous Given 10/16/19 1146)   HYDROmorphone (DILAUDID) injection 0.5 mg (0.5 mg Intravenous Given 10/16/19 1146)   ondansetron (ZOFRAN) injection 4 mg (4 mg Intravenous Given 10/16/19 1145)   fentaNYL citrate (PF) (SUBLIMAZE) injection 50 mcg (25 mcg Intravenous Given 10/16/19 1304)   ondansetron (ZOFRAN) injection 4 mg (4 mg Intravenous Given 10/16/19 1302)   sodium chloride 0.9 % bolus 1,000 mL (1,000 mL Intravenous New Bag 10/16/19 1948)   meropenem (MERREM) 1 g/100 mL 0.9% NS VTB (mbp) (1 g Intravenous New Bag 10/17/19 0127)   sodium chloride 0.9 % bolus 1,000 mL (1,000 mL Intravenous New Bag 10/17/19 0417)     ECG/EMG Results (last 24 hours)     ** No results found for the last 24 hours. **        ECG 12 Lead   Preliminary Result   Test Reason : HR increase   Blood Pressure : **/** mmHG   Vent. Rate : 122 BPM     Atrial Rate : 122 BPM      P-R Int : 000 ms          QRS Dur : 096 ms       QT Int : 436 ms       P-R-T Axes : 065 022 044 degrees      QTc Int : 621 ms      Sinus tachycardia   Otherwise normal ECG   When compared with ECG of 21-JUL-2015  17:24,   Nonspecific T wave abnormality has replaced inverted T waves in Inferior   leads      Referred By:             Confirmed By:                       MDM  Number of Diagnoses or Management Options  Intractable pain: new and requires workup  Renal colic on left side: new and requires workup     Amount and/or Complexity of Data Reviewed  Clinical lab tests: reviewed and ordered  Tests in the radiology section of CPT®: reviewed and ordered  Tests in the medicine section of CPT®: ordered and reviewed  Discuss the patient with other providers: yes        Final diagnoses:   Intractable pain   Renal colic on left side              Jhonatan Bello PA  10/17/19 0740

## 2019-10-17 ENCOUNTER — ANESTHESIA EVENT (OUTPATIENT)
Dept: PERIOP | Facility: HOSPITAL | Age: 39
End: 2019-10-17

## 2019-10-17 ENCOUNTER — APPOINTMENT (OUTPATIENT)
Dept: GENERAL RADIOLOGY | Facility: HOSPITAL | Age: 39
End: 2019-10-17

## 2019-10-17 PROBLEM — Z78.9 ALCOHOL USE: Status: ACTIVE | Noted: 2019-10-17

## 2019-10-17 PROBLEM — Z87.891 FORMER SMOKER: Status: ACTIVE | Noted: 2019-10-17

## 2019-10-17 PROBLEM — G89.29 CHRONIC BACK PAIN: Status: ACTIVE | Noted: 2019-10-17

## 2019-10-17 PROBLEM — N13.9 OBSTRUCTIVE UROPATHY: Status: ACTIVE | Noted: 2019-10-17

## 2019-10-17 PROBLEM — N39.0 UTI (URINARY TRACT INFECTION): Status: ACTIVE | Noted: 2019-10-17

## 2019-10-17 PROBLEM — E66.9 CLASS 2 OBESITY IN ADULT: Status: ACTIVE | Noted: 2019-10-17

## 2019-10-17 PROBLEM — E66.812 CLASS 2 OBESITY IN ADULT: Status: ACTIVE | Noted: 2019-10-17

## 2019-10-17 PROBLEM — M54.9 CHRONIC BACK PAIN: Status: ACTIVE | Noted: 2019-10-17

## 2019-10-17 LAB
ANION GAP SERPL CALCULATED.3IONS-SCNC: 12 MMOL/L (ref 5–15)
BASOPHILS # BLD MANUAL: 0 10*3/MM3 (ref 0–0.2)
BASOPHILS NFR BLD AUTO: 0 % (ref 0–1.5)
BUN BLD-MCNC: 13 MG/DL (ref 6–20)
BUN/CREAT SERPL: 11.9 (ref 7–25)
CALCIUM SPEC-SCNC: 8.8 MG/DL (ref 8.6–10.5)
CHLORIDE SERPL-SCNC: 106 MMOL/L (ref 98–107)
CO2 SERPL-SCNC: 21 MMOL/L (ref 22–29)
CORTIS SERPL-MCNC: 29.24 MCG/DL
CREAT BLD-MCNC: 1.09 MG/DL (ref 0.57–1)
D-LACTATE SERPL-SCNC: 2 MMOL/L (ref 0.5–2)
DEPRECATED RDW RBC AUTO: 40.9 FL (ref 37–54)
DEPRECATED RDW RBC AUTO: 41.4 FL (ref 37–54)
EOSINOPHIL # BLD MANUAL: 0 10*3/MM3 (ref 0–0.4)
EOSINOPHIL NFR BLD MANUAL: 0 % (ref 0.3–6.2)
ERYTHROCYTE [DISTWIDTH] IN BLOOD BY AUTOMATED COUNT: 12.9 % (ref 12.3–15.4)
ERYTHROCYTE [DISTWIDTH] IN BLOOD BY AUTOMATED COUNT: 12.9 % (ref 12.3–15.4)
GFR SERPL CREATININE-BSD FRML MDRD: 56 ML/MIN/1.73
GLUCOSE BLD-MCNC: 93 MG/DL (ref 65–99)
HCT VFR BLD AUTO: 31.9 % (ref 34–46.6)
HCT VFR BLD AUTO: 32.8 % (ref 34–46.6)
HGB BLD-MCNC: 10.2 G/DL (ref 12–15.9)
HGB BLD-MCNC: 10.4 G/DL (ref 12–15.9)
LDH SERPL-CCNC: 346 U/L (ref 135–214)
LYMPHOCYTES # BLD MANUAL: 0.59 10*3/MM3 (ref 0.7–3.1)
LYMPHOCYTES NFR BLD MANUAL: 3 % (ref 19.6–45.3)
LYMPHOCYTES NFR BLD MANUAL: 3 % (ref 5–12)
MAGNESIUM SERPL-MCNC: 1.6 MG/DL (ref 1.6–2.6)
MCH RBC QN AUTO: 27.7 PG (ref 26.6–33)
MCH RBC QN AUTO: 27.9 PG (ref 26.6–33)
MCHC RBC AUTO-ENTMCNC: 31.7 G/DL (ref 31.5–35.7)
MCHC RBC AUTO-ENTMCNC: 32 G/DL (ref 31.5–35.7)
MCV RBC AUTO: 86.7 FL (ref 79–97)
MCV RBC AUTO: 87.9 FL (ref 79–97)
MONOCYTES # BLD AUTO: 0.59 10*3/MM3 (ref 0.1–0.9)
NEUTROPHILS # BLD AUTO: 18.33 10*3/MM3 (ref 1.7–7)
NEUTROPHILS NFR BLD MANUAL: 72 % (ref 42.7–76)
NEUTS BAND NFR BLD MANUAL: 22 % (ref 0–5)
PHOSPHATE SERPL-MCNC: 3.2 MG/DL (ref 2.5–4.5)
PLAT MORPH BLD: NORMAL
PLATELET # BLD AUTO: 265 10*3/MM3 (ref 140–450)
PLATELET # BLD AUTO: 276 10*3/MM3 (ref 140–450)
PMV BLD AUTO: 9.4 FL (ref 6–12)
PMV BLD AUTO: 9.8 FL (ref 6–12)
POTASSIUM BLD-SCNC: 4 MMOL/L (ref 3.5–5.2)
PROCALCITONIN SERPL-MCNC: 0.88 NG/ML (ref 0.1–0.25)
RBC # BLD AUTO: 3.68 10*6/MM3 (ref 3.77–5.28)
RBC # BLD AUTO: 3.73 10*6/MM3 (ref 3.77–5.28)
RBC MORPH BLD: NORMAL
SODIUM BLD-SCNC: 139 MMOL/L (ref 136–145)
T4 FREE SERPL-MCNC: 1.5 NG/DL (ref 0.93–1.7)
TSH SERPL DL<=0.05 MIU/L-ACNC: 3.68 UIU/ML (ref 0.27–4.2)
WBC MORPH BLD: NORMAL
WBC NRBC COR # BLD: 19.14 10*3/MM3 (ref 3.4–10.8)
WBC NRBC COR # BLD: 19.5 10*3/MM3 (ref 3.4–10.8)

## 2019-10-17 PROCEDURE — 25010000002 ENOXAPARIN PER 10 MG: Performed by: INTERNAL MEDICINE

## 2019-10-17 PROCEDURE — 94799 UNLISTED PULMONARY SVC/PX: CPT

## 2019-10-17 PROCEDURE — 85027 COMPLETE CBC AUTOMATED: CPT | Performed by: NURSE PRACTITIONER

## 2019-10-17 PROCEDURE — 84100 ASSAY OF PHOSPHORUS: CPT | Performed by: NURSE PRACTITIONER

## 2019-10-17 PROCEDURE — 25010000002 HYDROMORPHONE PER 4 MG: Performed by: INTERNAL MEDICINE

## 2019-10-17 PROCEDURE — 93010 ELECTROCARDIOGRAM REPORT: CPT | Performed by: INTERNAL MEDICINE

## 2019-10-17 PROCEDURE — 74018 RADEX ABDOMEN 1 VIEW: CPT

## 2019-10-17 PROCEDURE — 25010000002 MEROPENEM PER 100 MG: Performed by: INTERNAL MEDICINE

## 2019-10-17 PROCEDURE — 83735 ASSAY OF MAGNESIUM: CPT | Performed by: NURSE PRACTITIONER

## 2019-10-17 PROCEDURE — 71045 X-RAY EXAM CHEST 1 VIEW: CPT

## 2019-10-17 PROCEDURE — 99232 SBSQ HOSP IP/OBS MODERATE 35: CPT | Performed by: INTERNAL MEDICINE

## 2019-10-17 PROCEDURE — 85025 COMPLETE CBC W/AUTO DIFF WBC: CPT | Performed by: HOSPITALIST

## 2019-10-17 PROCEDURE — 84145 PROCALCITONIN (PCT): CPT | Performed by: NURSE PRACTITIONER

## 2019-10-17 PROCEDURE — 84439 ASSAY OF FREE THYROXINE: CPT | Performed by: NURSE PRACTITIONER

## 2019-10-17 PROCEDURE — 82533 TOTAL CORTISOL: CPT | Performed by: NURSE PRACTITIONER

## 2019-10-17 PROCEDURE — 85007 BL SMEAR W/DIFF WBC COUNT: CPT | Performed by: HOSPITALIST

## 2019-10-17 PROCEDURE — 93005 ELECTROCARDIOGRAM TRACING: CPT | Performed by: INTERNAL MEDICINE

## 2019-10-17 PROCEDURE — 84443 ASSAY THYROID STIM HORMONE: CPT | Performed by: NURSE PRACTITIONER

## 2019-10-17 PROCEDURE — 25010000002 ONDANSETRON PER 1 MG: Performed by: HOSPITALIST

## 2019-10-17 PROCEDURE — 83605 ASSAY OF LACTIC ACID: CPT | Performed by: INTERNAL MEDICINE

## 2019-10-17 RX ORDER — OXYCODONE HYDROCHLORIDE AND ACETAMINOPHEN 5; 325 MG/1; MG/1
1 TABLET ORAL EVERY 6 HOURS PRN
Status: DISCONTINUED | OUTPATIENT
Start: 2019-10-17 | End: 2019-10-17

## 2019-10-17 RX ORDER — AMOXICILLIN 250 MG
2 CAPSULE ORAL 2 TIMES DAILY PRN
Status: DISCONTINUED | OUTPATIENT
Start: 2019-10-17 | End: 2019-10-20 | Stop reason: HOSPADM

## 2019-10-17 RX ORDER — HYDROMORPHONE HYDROCHLORIDE 1 MG/ML
0.5 INJECTION, SOLUTION INTRAMUSCULAR; INTRAVENOUS; SUBCUTANEOUS
Status: DISCONTINUED | OUTPATIENT
Start: 2019-10-17 | End: 2019-10-17

## 2019-10-17 RX ORDER — IBUPROFEN 400 MG/1
400 TABLET ORAL EVERY 4 HOURS PRN
Status: DISCONTINUED | OUTPATIENT
Start: 2019-10-17 | End: 2019-10-20 | Stop reason: HOSPADM

## 2019-10-17 RX ORDER — FAMOTIDINE 10 MG/ML
20 INJECTION, SOLUTION INTRAVENOUS ONCE
Status: CANCELLED | OUTPATIENT
Start: 2019-10-17 | End: 2019-10-17

## 2019-10-17 RX ORDER — HYDROMORPHONE HYDROCHLORIDE 1 MG/ML
0.5 INJECTION, SOLUTION INTRAMUSCULAR; INTRAVENOUS; SUBCUTANEOUS
Status: CANCELLED | OUTPATIENT
Start: 2019-10-17

## 2019-10-17 RX ORDER — SODIUM CHLORIDE, SODIUM LACTATE, POTASSIUM CHLORIDE, CALCIUM CHLORIDE 600; 310; 30; 20 MG/100ML; MG/100ML; MG/100ML; MG/100ML
50 INJECTION, SOLUTION INTRAVENOUS CONTINUOUS
Status: DISCONTINUED | OUTPATIENT
Start: 2019-10-17 | End: 2019-10-19

## 2019-10-17 RX ORDER — MAGNESIUM SULFATE HEPTAHYDRATE 40 MG/ML
4 INJECTION, SOLUTION INTRAVENOUS AS NEEDED
Status: DISCONTINUED | OUTPATIENT
Start: 2019-10-17 | End: 2019-10-20 | Stop reason: HOSPADM

## 2019-10-17 RX ORDER — LEVOTHYROXINE SODIUM 75 UG/1
75 CAPSULE ORAL
Status: DISCONTINUED | OUTPATIENT
Start: 2019-10-17 | End: 2019-10-20 | Stop reason: HOSPADM

## 2019-10-17 RX ORDER — PANTOPRAZOLE SODIUM 40 MG/1
40 TABLET, DELAYED RELEASE ORAL EVERY MORNING
Status: DISCONTINUED | OUTPATIENT
Start: 2019-10-17 | End: 2019-10-20 | Stop reason: HOSPADM

## 2019-10-17 RX ORDER — FENTANYL CITRATE 50 UG/ML
50 INJECTION, SOLUTION INTRAMUSCULAR; INTRAVENOUS
Status: CANCELLED | OUTPATIENT
Start: 2019-10-17

## 2019-10-17 RX ORDER — OXYCODONE AND ACETAMINOPHEN 7.5; 325 MG/1; MG/1
2 TABLET ORAL EVERY 6 HOURS PRN
Status: DISCONTINUED | OUTPATIENT
Start: 2019-10-17 | End: 2019-10-20 | Stop reason: HOSPADM

## 2019-10-17 RX ORDER — MAGNESIUM SULFATE HEPTAHYDRATE 40 MG/ML
2 INJECTION, SOLUTION INTRAVENOUS AS NEEDED
Status: DISCONTINUED | OUTPATIENT
Start: 2019-10-17 | End: 2019-10-20 | Stop reason: HOSPADM

## 2019-10-17 RX ADMIN — TAMSULOSIN HYDROCHLORIDE 0.4 MG: 0.4 CAPSULE ORAL at 08:33

## 2019-10-17 RX ADMIN — LISINOPRIL 5 MG: 5 TABLET ORAL at 08:33

## 2019-10-17 RX ADMIN — ENOXAPARIN SODIUM 40 MG: 40 INJECTION SUBCUTANEOUS at 21:25

## 2019-10-17 RX ADMIN — SODIUM CHLORIDE, POTASSIUM CHLORIDE, SODIUM LACTATE AND CALCIUM CHLORIDE 100 ML/HR: 600; 310; 30; 20 INJECTION, SOLUTION INTRAVENOUS at 10:13

## 2019-10-17 RX ADMIN — ACETAMINOPHEN 650 MG: 325 TABLET ORAL at 01:27

## 2019-10-17 RX ADMIN — LEVOTHYROXINE SODIUM 75 MCG: 75 CAPSULE ORAL at 07:26

## 2019-10-17 RX ADMIN — OXYCODONE HYDROCHLORIDE AND ACETAMINOPHEN 1 TABLET: 5; 325 TABLET ORAL at 12:53

## 2019-10-17 RX ADMIN — PANTOPRAZOLE SODIUM 40 MG: 40 TABLET, DELAYED RELEASE ORAL at 06:55

## 2019-10-17 RX ADMIN — SODIUM CHLORIDE 125 ML/HR: 9 INJECTION, SOLUTION INTRAVENOUS at 04:57

## 2019-10-17 RX ADMIN — IBUPROFEN 400 MG: 400 TABLET ORAL at 17:19

## 2019-10-17 RX ADMIN — MEROPENEM 1 G: 1 INJECTION INTRAVENOUS at 01:27

## 2019-10-17 RX ADMIN — MAGNESIUM SULFATE HEPTAHYDRATE 4 G: 40 INJECTION, SOLUTION INTRAVENOUS at 12:34

## 2019-10-17 RX ADMIN — MEROPENEM 1 G: 1 INJECTION INTRAVENOUS at 16:10

## 2019-10-17 RX ADMIN — ONDANSETRON 4 MG: 2 INJECTION INTRAMUSCULAR; INTRAVENOUS at 17:01

## 2019-10-17 RX ADMIN — SODIUM CHLORIDE 1000 ML: 9 INJECTION, SOLUTION INTRAVENOUS at 04:17

## 2019-10-17 RX ADMIN — OXYCODONE HYDROCHLORIDE AND ACETAMINOPHEN 1 TABLET: 7.5; 325 TABLET ORAL at 18:37

## 2019-10-17 RX ADMIN — ONDANSETRON 4 MG: 2 INJECTION INTRAMUSCULAR; INTRAVENOUS at 04:57

## 2019-10-17 RX ADMIN — IBUPROFEN 400 MG: 400 TABLET, FILM COATED ORAL at 02:15

## 2019-10-17 RX ADMIN — HYDROMORPHONE HYDROCHLORIDE 0.5 MG: 1 INJECTION, SOLUTION INTRAMUSCULAR; INTRAVENOUS; SUBCUTANEOUS at 10:12

## 2019-10-17 RX ADMIN — ACETAMINOPHEN 325 MG: 325 TABLET ORAL at 18:36

## 2019-10-17 RX ADMIN — ONDANSETRON 4 MG: 2 INJECTION INTRAMUSCULAR; INTRAVENOUS at 10:19

## 2019-10-17 RX ADMIN — HYDROMORPHONE HYDROCHLORIDE 0.5 MG: 1 INJECTION, SOLUTION INTRAMUSCULAR; INTRAVENOUS; SUBCUTANEOUS at 16:10

## 2019-10-17 RX ADMIN — MEROPENEM 1 G: 1 INJECTION INTRAVENOUS at 10:12

## 2019-10-17 NOTE — NURSING NOTE
Pt HR increased. Blood cultures obtained. Tylenol for temp. C/O abd pain. Sepsis criteria met. MD notified.

## 2019-10-17 NOTE — PROGRESS NOTES
Clinical Nutrition     Multidisciplinary Rounds      Patient Name: Brandy Garcia  Date of Encounter: 10/17/19 9:42 AM  MRN: [id number]  Admission date: 10/16/2019      Reason for visit: MDR. RD to continue to follow per protocol.     Additional information obtained during MDR: NPO for further assessment.     Current diet: NPO Diet  No active supplement orders      EMR reviewed   MST=0    Intervention:  Follow treatment plan  Care plan reviewed    Follow up:   Per protocol      Hillary Draper RDN, LD  9:42 AM  Time: 10min

## 2019-10-17 NOTE — PROGRESS NOTES
INTENSIVIST   PROGRESS NOTE     Hospital:  LOS: 1 day      S     Ms. Brandy Garcia, 39 y.o. female is followed for:      Left flank pain    Left obstructive uropathy    Recent UTI    Left renal calculi X2     As an Intensivist, we provide an integrated approach to the ICU patient and family, medical management of comorbid conditions, lead interdisciplinary rounds and coordinate the care with all other services, including those from other specialists.     Interval History:  Brandy Garcia is a 39 y.o. female admitted to [facility] on 10/16 with concern for pyelonephritis.     Last week she was seen in an [facility] and treated for UTI with Macrobid and Pyridium.     CT abdomen and pelvis showed mild left obstructive uropathy with 3mm non-obstructive stone as well as 2 renal pelvis calcifications (10mm and 2.5mm) previously noted on 2018 exam. It appears the larger calcification is closely associated with the left ureter and may be intraureteral.     She was admitted to the Hospitalist service and placed on IVFs, tamsulosin, and empiric ceftriaxone. Despite narcotics there were ongoing complaints of intractable pain. However pain resolved by 18:00     The morning of 10/17, she was transferred to telemetry and developed worsening hypotension, tachycardia, and fever with max 101F. Antimicrobials were changed from Ceftriaxone to Meropenem.    Because of the hypotension, Dr. Rinaldi called to transfer her to ICU.     The patient's relevant past medical, surgical and social history were reviewed and updated in Epic as appropriate.     ROS:   Constitutional: Negative for fever.   Respiratory: Negative for dyspnea.   Cardiovascular: Negative for chest pain.   Gastrointestinal: Negative for nausea, vomiting and diarrhea.        O     Vitals:  Temp: 98 °F (36.7 °C) (10/17/19 0350) Temp  Min: 98 °F (36.7 °C)  Max: 101 °F (38.3 °C)   BP: (!) 87/49 (10/17/19 0425) BP  Min: 87/49  Max: 144/86   Pulse: (!) 124 (10/17/19 0430) Pulse  Min:  77  Max: 124   Resp: 18 (10/16/19 6542) Resp  Min: 14  Max: 18   SpO2: 98 % (10/17/19 0210) SpO2  Min: 94 %  Max: 100 %   Device: room air (10/17/19 0421)    Flow Rate:   No Data Recorded     Intake/Ouptut 24 hrs (7:00AM - 6:59 AM)  Intake/Output       10/16/19 0700 - 10/17/19 0659    Intake (ml) 2100    Output (ml) 1200    Net (ml) 900    Last Weight  111 kg (245 lb)           Medications (drips):    sodium chloride Last Rate: 125 mL/hr (10/17/19 0457)     Physical Examination  Telemetry:            Constitutional:  No acute distress.   Cardiovascular: Tachycardia.   Regular rhythm.   Normal heart sounds.  No murmurs, gallop or rub.   Respiratory: No respiratory distress. Normal respiratory effort.  Normal breath sounds  Clear to auscultation and percussion.    Abdominal:  Obese. Soft. No masses.   Slight tenderness on the left flank. No rebound.  . No distension. No HSM.   Extremities: No digital cyanosis. No clubbing.  No peripheral edema.   Neurological:   Alert and Oriented to person, place, and time.  Best Eye Response: 4-->(E4) spontaneous (10/17/19 0216)  Best Motor Response: 6-->(M6) obeys commands (10/17/19 0216)  Best Verbal Response: 5-->(V5) oriented (10/17/19 0216)  Ulices Coma Scale Score: 15 (10/17/19 0216)   Lines/Drains/Airways: Peripheral IV(s)     Hematology:  Results from last 7 days   Lab Units 10/17/19  0319 10/16/19  1136   WBC 10*3/mm3 19.50* 6.90   HEMOGLOBIN g/dL 10.4* 12.5   MCV fL 87.9 87.2   PLATELETS 10*3/mm3 276 415     Chemistry:  Estimated Creatinine Clearance: 89.9 mL/min (A) (by C-G formula based on SCr of 1.09 mg/dL (H)).  Results from last 7 days   Lab Units 10/16/19  2028 10/16/19  1136   SODIUM mmol/L 139 141   POTASSIUM mmol/L 4.0 3.9   CHLORIDE mmol/L 106 105   CO2 mmol/L 21.0* 26.0   ANION GAP mmol/L 12.0 10.0   GLUCOSE mg/dL 93 102*   CALCIUM mg/dL 8.8 9.5     Results from last 7 days   Lab Units 10/16/19  2028 10/16/19  1136   BUN mg/dL 13 12   CREATININE mg/dL 1.09*  0.82           Hepatic:  Results from last 7 days   Lab Units 10/16/19  1136   ALK PHOS U/L 64   BILIRUBIN mg/dL 0.2   ALT (SGPT) U/L 31   AST (SGOT) U/L 19   ALBUMIN g/dL 4.50       Lab Results   Lab Value Date/Time    CORTISOL 29.24 10/17/2019 0447     Lab Results   Lab Value Date/Time    PROCALCITO 0.88 (H) 10/17/2019 0447     Lab Results   Lab Value Date/Time    LACTATE 2.0 10/17/2019 0319      Images:  Ct Abdomen Pelvis Without Contrast    Result Date: 10/16/2019  1.  Mild left-sided obstructive uropathy, and 3 mm nonobstructing left renal calculus. 2.  Two calcifications in the left pelvis, 10 mm and 2.5 mm in diameter, both of which were present on the 08/08/2018 exam and appeared external to the ureter. The larger calcification appears very closely associated with the left ureter today, but it is questionable whether this is actually intraureteral. Consider evaluation by urology. 3.  No other evidence of acute intra-abdominal or intrapelvic disease is seen.  D:  10/16/2019 E:  10/16/2019  This report was finalized on 10/16/2019 9:56 PM by DR. Onesimo Cantu MD.      Us Non-ob Transvaginal    Result Date: 10/16/2019  Incidentally noted nabothian cysts. Otherwise negative pelvic ultrasound. Uterus and ovaries appear within normal limits. In particular, no visible left ovarian cyst or evidence of torsion.   DICTATED:   10/16/2019 EDITED/ls :   10/16/2019  This report was finalized on 10/16/2019 10:35 PM by DR. Onesimo Cantu MD.      Xr Chest 1 View    Result Date: 10/17/2019  No acute cardiopulmonary findings. Signer Name: Severo Licona MD  Signed: 10/17/2019 1:32 AM  Workstation Name: [id number]  [facility]    Us Testicular Or Ovarian Vascular Limited    Result Date: 10/16/2019  Incidentally noted nabothian cysts. Otherwise negative pelvic ultrasound. Uterus and ovaries appear within normal limits. In particular, no visible left ovarian cyst or evidence of torsion.   DICTATED:   10/16/2019  EDITED/ls :   10/16/2019  This report was finalized on 10/16/2019 10:35 PM by DR. Onesimo Cantu MD.        Results: Reviewed.  I reviewed the patient's new laboratory and imaging results.  I independently reviewed the patient's new images.    Medications: Reviewed.    Assessment/Plan   A / P     39 y.o.female, admitted on 10/16/2019 with Intractable pain [R52]  Sepsis (CMS/HCC) [A41.9]:     1. Fever, Hypotension, R/O Sepsis  1. Source: Probable urinary.  2. Left obstructive uropathy  3. Left renal calculi X2 (10mm and 2.5mm)} also noted on 2018 imaging   4. Recent UTI on Macrobid  2. Hypothyroidism on Rx  1. Dose adjusted ~ AUG 2019  3. GERD  4. Chronic low back pain   5. Former smoker  6. Class II obesity Body mass index is 38.37 kg/m².  7. Antibiotics: meropenem   8. Glucose: No h/o DM      No results found for: HGBA1C    Diet: NPO Diet No active supplement orders     Advance Directives: Code Status and Medical Interventions:   Ordered at: 10/16/19 1558     Level Of Support Discussed With:    Patient     Code Status:    CPR     Medical Interventions (Level of Support Prior to Arrest):    Full        Assessment / Plan:    1. Hemodynamic support  2. IVF  3. F/U PCT  4. Urology consult    Plan of care and goals reviewed during interdisciplinary rounds.  Level of Risk is High due to:  illness with threat to life or bodily function.   I discussed the patient's findings and my recommendations with patient, family and nursing staff    SHYLA Collado, AGACELESTINOP-BC, FNP-BC   Pulmonary and Critical Care     I have personally seen, interviewed and examined the patient and verified all the key components of the history, physical examination, assessment and plan with SHYLA Collado, JUANP-BC  and reviewed the note, which reflects my changes and contributions.      Juan Manuel Espinoza MD, FACP, FCCP, [facility]  Intensive Care Medicine, Nutrition Support and Pulmonary Medicine

## 2019-10-17 NOTE — PLAN OF CARE
Problem: Patient Care Overview  Goal: Plan of Care Review  Outcome: Ongoing (interventions implemented as appropriate)   10/17/19 0677   Coping/Psychosocial   Plan of Care Reviewed With patient   Plan of Care Review   Progress improving   OTHER   Outcome Summary Transferred to ICU at 0445 with sepsis/hypotension. HR 120s and SBP 90s upon arrival to unit, but improved to HR 90s and SBP 100s with NS at 125 ml/hr. Good uop with small amount of sediment noted in strainer. Pt denies pain at this time, but c/o nausea that improved with zofran. Temp 99.0. Urology consult pending.        Problem: Skin Injury Risk (Adult)  Goal: Identify Related Risk Factors and Signs and Symptoms  Outcome: Outcome(s) achieved Date Met: 10/17/19    Goal: Skin Health and Integrity  Outcome: Ongoing (interventions implemented as appropriate)

## 2019-10-17 NOTE — CONSULTS
Consult    Referring Provider:No ref. provider found  Reason for Consultation: RenaL COLIC      Patient Care Team:  Emily Solano APRN as PCP - General (Family Medicine)  Provider, No Known as PCP - Family Medicine    Chief complaint   Chief Complaint   Patient presents with   • Abdominal Pain   • Flank Pain       Subjective .     History of present illness:  I was asked to evaluate this patient at the behest of Jourdan Aburto MD, in relation to left-sided renal colic yesterday. The patient is a 39-year-old woman with a presumed history of prior kidney stones noted in the past which she believes she passed spontaneously. The patient was admitted yesterday with left-sided lower abdominal pain which ascended into the left upper quadrant. She had mild flank pain. The patient was admitted for pain control. The patient this morning says she is pain free and hungry. She denies any nausea, vomiting, or fevers. No dysuria. No hematuria. CT scan on this admission compared to a scan in the past was compared and the findings are notable for a 2 mm left renal stone and pelvic phleboliths on the left, 2 in number, and 1 of which is 1 cm in size. There is no evidence of hydronephrosis on this new CT scan.        Past Medical History:   Diagnosis Date   • Anxiety    • Depression    • Disease of thyroid gland    • GERD (gastroesophageal reflux disease)    • Kidney stone      Past Surgical History:   Procedure Laterality Date   • CHOLECYSTECTOMY     • LEEP           Current Facility-Administered Medications:   •  acetaminophen (TYLENOL) tablet 650 mg, 650 mg, Oral, Q4H PRN, 650 mg at 10/17/19 0127 **OR** [DISCONTINUED] acetaminophen (TYLENOL) 160 MG/5ML solution 650 mg, 650 mg, Oral, Q4H PRN **OR** [DISCONTINUED] acetaminophen (TYLENOL) suppository 650 mg, 650 mg, Rectal, Q4H PRN, Lara Anne MD  •  HYDROmorphone (DILAUDID) injection 0.5 mg, 0.5 mg, Intravenous, Q2H PRN, Juan Manuel Espinoza MD  •  levothyroxine  "sodium (TIROSINT) capsule 75 mcg **BRAND ONLY  - PATIENT SUPPLIED**, 75 mcg, Oral, Q AM, Lara Anne MD, 75 mcg at 10/17/19 0726  •  lidocaine (XYLOCAINE) 1 % 150 mg in sodium chloride 0.9 % 250 mL IVPB, 150 mg, Intravenous, Once, Jhonatan Bello PA  •  lisinopril (PRINIVIL,ZESTRIL) tablet 5 mg, 5 mg, Oral, Daily, Lara Anne MD  •  LORazepam (ATIVAN) injection 0.5 mg, 0.5 mg, Intravenous, Once, Lara Anne MD  •  meropenem (MERREM) 1 g/100 mL 0.9% NS VTB (mbp), 1 g, Intravenous, Q8H, Khloe Rinaldi DO  •  ondansetron (ZOFRAN) injection 4 mg, 4 mg, Intravenous, Q6H PRN, Lara Anne MD, 4 mg at 10/17/19 0457  •  pantoprazole (PROTONIX) EC tablet 40 mg, 40 mg, Oral, Alexis PAK Yuri, MD, 40 mg at 10/17/19 0655  •  sodium chloride 0.9 % infusion, 125 mL/hr, Intravenous, Continuous, Lara Anne MD, Last Rate: 125 mL/hr at 10/17/19 0457, 125 mL/hr at 10/17/19 0457  •  tamsulosin (FLOMAX) 24 hr capsule 0.4 mg, 0.4 mg, Oral, Daily, Lara Anne MD, 0.4 mg at 10/16/19 1803  Prednisone; Brethaire [terbutaline]; Zithromax [azithromycin]; and Amoxicillin    Review of Systems  Pertinent items are noted in HPI, all other systems reviewed and negative    Objective     Vital Signs   /61   Pulse 94   Temp 99 °F (37.2 °C) (Oral)   Resp 18   Ht 170.2 cm (67\")   Wt 128 kg (281 lb 8.4 oz)   LMP 10/06/2019 (Approximate)   SpO2 96%   BMI 44.09 kg/m²     Physical Exam:  General AppearanceNormal affect, comfortable, no distress. Normal vital signs. Obese.      Back: No kyphosis present, no scoliosis present,no tenderness to percussion or Palpation  Lungs: Respirations regular, even and  unlabored  Chest Wall: No abnormalities observed  Abdomen: No masses, no organomegaly, soft   non-tender, non-distended, no guarding, no rebound    Extremities: Moves all extremities well, no edema, no cyanosis, no redness    Skin: No bleeding, bruising or " rash    Neurologic: Cranial Nerves 1-12 grossly intact    Results Review:  Lab Results (last 24 hours)     Procedure Component Value Units Date/Time    T4, Free [[id number]]  (Normal) Collected:  10/17/19 0447    Specimen:  Blood Updated:  10/17/19 0619     Free T4 1.50 ng/dL     Magnesium [[id number]]  (Normal) Collected:  10/17/19 0447    Specimen:  Blood Updated:  10/17/19 0618     Magnesium 1.6 mg/dL     Phosphorus [[id number]]  (Normal) Collected:  10/17/19 0447    Specimen:  Blood Updated:  10/17/19 0618     Phosphorus 3.2 mg/dL     Cortisol [[id number]] Collected:  10/17/19 0447    Specimen:  Blood Updated:  10/17/19 0618     Cortisol 29.24 mcg/dL     Narrative:       Cortisol Reference Ranges:    Cortisol 6AM - 10AM Range: 6.02-18.40 mcg/dl  Cortisol 4PM - 8PM Range: 2.68-10.50 mcg/dl      TSH [[id number]]  (Normal) Collected:  10/17/19 0447    Specimen:  Blood Updated:  10/17/19 0618     TSH 3.680 uIU/mL     CBC (No Diff) [[id number]]  (Abnormal) Collected:  10/17/19 0537    Specimen:  Blood Updated:  10/17/19 0617     WBC 19.14 10*3/mm3      RBC 3.68 10*6/mm3      Hemoglobin 10.2 g/dL      Hematocrit 31.9 %      MCV 86.7 fL      MCH 27.7 pg      MCHC 32.0 g/dL      RDW 12.9 %      RDW-SD 40.9 fl      MPV 9.8 fL      Platelets 265 10*3/mm3     Procalcitonin [[id number]]  (Abnormal) Collected:  10/17/19 0447    Specimen:  Blood Updated:  10/17/19 0551     Procalcitonin 0.88 ng/mL     Narrative:       As a Marker for Sepsis (Non-Neonates):   1. <0.5 ng/mL represents a low risk of severe sepsis and/or septic shock.  1. >2 ng/mL represents a high risk of severe sepsis and/or septic shock.    As a Marker for Lower Respiratory Tract Infections that require antibiotic therapy:  PCT on Admission     Antibiotic Therapy             6-12 Hrs later  > 0.5                Strongly Recommended            >0.25 - <0.5         Recommended  0.1 - 0.25           Discouraged                   Remeasure/reassess PCT  <0.1               "   Strongly Discouraged          Remeasure/reassess PCT      As 28 day mortality risk marker: \"Change in Procalcitonin Result\" (> 80 % or <=80 %) if Day 0 (or Day 1) and Day 4 values are available. Refer to http://[email or web address]-pct-[email or web address]/   Change in PCT <=80 %   A decrease of PCT levels below or equal to 80 % defines a positive change in PCT test result representing a higher risk for 28-day all-cause mortality of patients diagnosed with severe sepsis or septic shock.  Change in PCT > 80 %   A decrease of PCT levels of more than 80 % defines a negative change in PCT result representing a lower risk for 28-day all-cause mortality of patients diagnosed with severe sepsis or septic shock.                CBC Auto Differential [[id number]]  (Abnormal) Collected:  10/17/19 0319    Specimen:  Blood Updated:  10/17/19 0441     WBC 19.50 10*3/mm3      Comment: Result chucked        RBC 3.73 10*6/mm3      Hemoglobin 10.4 g/dL      Hematocrit 32.8 %      MCV 87.9 fL      MCH 27.9 pg      MCHC 31.7 g/dL      RDW 12.9 %      RDW-SD 41.4 fl      MPV 9.4 fL      Platelets 276 10*3/mm3     Manual Differential [[id number]]  (Abnormal) Collected:  10/17/19 0319    Specimen:  Blood Updated:  10/17/19 0441     Neutrophil % 72.0 %      Lymphocyte % 3.0 %      Monocyte % 3.0 %      Eosinophil % 0.0 %      Basophil % 0.0 %      Bands %  22.0 %      Neutrophils Absolute 18.33 10*3/mm3      Lymphocytes Absolute 0.59 10*3/mm3      Monocytes Absolute 0.59 10*3/mm3      Eosinophils Absolute 0.00 10*3/mm3      Basophils Absolute 0.00 10*3/mm3      RBC Morphology Normal     WBC Morphology Normal     Platelet Morphology Normal    Lactic Acid, Plasma [[id number]]  (Normal) Collected:  10/17/19 0319    Specimen:  Blood Updated:  10/17/19 0355     Lactate 2.0 mmol/L      Comment: Falsely depressed results may occur on samples drawn from patients receiving N-Acetylcysteine (NAC) or Metamizole.       Lactate Dehydrogenase [[id number]]  (Abnormal) " Collected:  10/16/19 2028    Specimen:  Blood Updated:  10/17/19 0142      U/L      Comment: Specimen hemolyzed.  Results may be affected.       Basic Metabolic Panel [[id number]]  (Abnormal) Collected:  10/16/19 2028    Specimen:  Blood Updated:  10/17/19 0140     Glucose 93 mg/dL      BUN 13 mg/dL      Creatinine 1.09 mg/dL      Sodium 139 mmol/L      Potassium 4.0 mmol/L      Chloride 106 mmol/L      CO2 21.0 mmol/L      Calcium 8.8 mg/dL      eGFR Non African Amer 56 mL/min/1.73      BUN/Creatinine Ratio 11.9     Anion Gap 12.0 mmol/L     Narrative:       GFR Normal >60  Chronic Kidney Disease <60  Kidney Failure <15    Blood Culture - Blood, Arm, Left [[id number]] Collected:  10/16/19 2028    Specimen:  Blood from Arm, Left Updated:  10/16/19 2106    Blood Culture - Blood, Hand, Left [[id number]] Collected:  10/16/19 2028    Specimen:  Blood from Hand, Left Updated:  10/16/19 2106    [place] Draw [[id number]] Collected:  10/16/19 1136    Specimen:  Blood Updated:  10/16/19 1245    Narrative:       The following orders were created for panel order [place] Draw.  Procedure                               Abnormality         Status                     ---------                               -----------         ------                     Light Blue Top[[id number]]                                   Final result               Green Top (Gel)[[id number]]                                  Final result               Lavender Top[[id number]]                                     Final result               Gold Top - SST[[id number]]                                   Final result                 Please view results for these tests on the individual orders.    Light Blue Top [[id number]] Collected:  10/16/19 1136    Specimen:  Blood Updated:  10/16/19 1245     Extra Tube hold for add-on     Comment: Auto resulted       Green Top (Gel) [[id number]] Collected:  10/16/19 1136    Specimen:  Blood Updated:  10/16/19 1245     Extra Tube  Hold for add-ons.     Comment: Auto resulted.       Lavender Top [[id number]] Collected:  10/16/19 1136    Specimen:  Blood Updated:  10/16/19 1245     Extra Tube hold for add-on     Comment: Auto resulted       Gold Top - SST [[id number]] Collected:  10/16/19 1136    Specimen:  Blood Updated:  10/16/19 1245     Extra Tube Hold for add-ons.     Comment: Auto resulted.       Comprehensive Metabolic Panel [[id number]]  (Abnormal) Collected:  10/16/19 1136    Specimen:  Blood Updated:  10/16/19 1217     Glucose 102 mg/dL      BUN 12 mg/dL      Creatinine 0.82 mg/dL      Sodium 141 mmol/L      Potassium 3.9 mmol/L      Chloride 105 mmol/L      CO2 26.0 mmol/L      Calcium 9.5 mg/dL      Total Protein 7.7 g/dL      Albumin 4.50 g/dL      ALT (SGPT) 31 U/L      AST (SGOT) 19 U/L      Alkaline Phosphatase 64 U/L      Total Bilirubin 0.2 mg/dL      eGFR Non African Amer 78 mL/min/1.73      Globulin 3.2 gm/dL      A/G Ratio 1.4 g/dL      BUN/Creatinine Ratio 14.6     Anion Gap 10.0 mmol/L     Narrative:       GFR Normal >60  Chronic Kidney Disease <60  Kidney Failure <15    Urinalysis With Microscopic If Indicated (No Culture) - Urine, Clean Catch [[id number]]  (Abnormal) Collected:  10/16/19 1128    Specimen:  Urine, Clean Catch Updated:  10/16/19 1156     Color, UA Yellow     Appearance, UA Clear     pH, UA 5.5     Specific Gravity, UA 1.009     Glucose, UA Negative     Ketones, UA Negative     Bilirubin, UA Negative     Blood, UA Negative     Protein, UA Negative     Leuk Esterase, UA Small (1+)     Nitrite, UA Negative     Urobilinogen, UA 0.2 E.U./dL    Urinalysis, Microscopic Only - Urine, Clean Catch [[id number]]  (Abnormal) Collected:  10/16/19 1128    Specimen:  Urine, Clean Catch Updated:  10/16/19 1156     RBC, UA 0-2 /HPF      WBC, UA 6-12 /HPF      Bacteria, UA None Seen /HPF      Squamous Epithelial Cells, UA 0-2 /HPF      Hyaline Casts, UA 0-6 /LPF      Methodology Automated Microscopy    CBC & Differential  [[id number]] Collected:  10/16/19 1136    Specimen:  Blood Updated:  10/16/19 1155    Narrative:       The following orders were created for panel order CBC & Differential.  Procedure                               Abnormality         Status                     ---------                               -----------         ------                     CBC Auto Differential[[id number]]        Normal              Final result                 Please view results for these tests on the individual orders.    CBC Auto Differential [[id number]]  (Normal) Collected:  10/16/19 1136    Specimen:  Blood Updated:  10/16/19 1155     WBC 6.90 10*3/mm3      RBC 4.46 10*6/mm3      Hemoglobin 12.5 g/dL      Hematocrit 38.9 %      MCV 87.2 fL      MCH 28.0 pg      MCHC 32.1 g/dL      RDW 12.7 %      RDW-SD 40.1 fl      MPV 9.6 fL      Platelets 415 10*3/mm3      Neutrophil % 56.6 %      Lymphocyte % 30.4 %      Monocyte % 7.2 %      Eosinophil % 4.8 %      Basophil % 0.7 %      Immature Grans % 0.3 %      Neutrophils, Absolute 3.90 10*3/mm3      Lymphocytes, Absolute 2.10 10*3/mm3      Monocytes, Absolute 0.50 10*3/mm3      Eosinophils, Absolute 0.33 10*3/mm3      Basophils, Absolute 0.05 10*3/mm3      Immature Grans, Absolute 0.02 10*3/mm3      nRBC 0.0 /100 WBC     POCT Pregnancy, Urine [[id number]]  (Normal) Collected:  10/16/19 1135    Specimen:  Urine Updated:  10/16/19 1136     HCG, Urine, QL Negative     Lot Number [id number]     Internal Positive Control Positive     Internal Negative Control Negative        Imaging Results (last 72 hours)     Procedure Component Value Units Date/Time    XR Chest 1 View [[id number]] Collected:  10/17/19 0132     Updated:  10/17/19 0134    Narrative:       CR Chest 1 Vw    INDICATION:   Acute onset of fever and hypotension     COMPARISON:    September 9, 2018    FINDINGS:  Single portable AP view(s) of the chest.    The heart and mediastinal contours are normal. The lungs are clear. No pneumothorax or  pleural effusion.       Impression:       No acute cardiopulmonary findings.    Signer Name: Severo Licona MD   Signed: 10/17/2019 1:32 AM   Workstation Name: [id number]    [facility]    US Non-ob Transvaginal [[id number]] Collected:  10/16/19 1603     Updated:  10/16/19 2238    Narrative:       EXAMINATION: US NON-OB TRANSVAGINAL, US TESTICULAR OR OVARIAN VASCULAR  LIMITED - 10/16/2019     INDICATION: Left adnexal pain x 1 week.     TECHNIQUE: Duplex interrogation is be performed to the ovaries to  visualize both arterial and venous waveforms.     COMPARISON: 11/20/2018 transvaginal ultrasound.     FINDINGS: Multiple small nabothian cysts are seen in the cervix. The  uterus measures approximately 9.7 x 5.2 x 5.8 cm and is otherwise  homogeneous in echotexture. No uterine mass is identified. The  endometrial echo complex is generally uniform and normal in appearance  at approximately 8 mm in width.     The right ovary measures approximately 4.3 x 2.1 x 2.5 cm and contains  multiple small follicles. There is normal arterial waveform and color  Doppler flow.     Left ovary measures approximately 3.4 x 1.9 x 2.3 cm and contains  multiple small follicles. There is normal arterial waveform and color  Doppler flow. No adnexal mass or significant intrapelvic free fluid is  seen.       Impression:       Incidentally noted nabothian cysts. Otherwise negative  pelvic ultrasound. Uterus and ovaries appear within normal limits. In  particular, no visible left ovarian cyst or evidence of torsion.      DICTATED:   10/16/2019  EDITED/ls :   10/16/2019      This report was finalized on 10/16/2019 10:35 PM by DR. Onesimo Cantu MD.       US Testicular or Ovarian Vascular Limited [[id number]] Collected:  10/16/19 1603     Updated:  10/16/19 2238    Narrative:       EXAMINATION: US NON-OB TRANSVAGINAL, US TESTICULAR OR OVARIAN VASCULAR  LIMITED - 10/16/2019     INDICATION: Left adnexal pain x 1 week.      TECHNIQUE: Duplex interrogation is be performed to the ovaries to  visualize both arterial and venous waveforms.     COMPARISON: 11/20/2018 transvaginal ultrasound.     FINDINGS: Multiple small nabothian cysts are seen in the cervix. The  uterus measures approximately 9.7 x 5.2 x 5.8 cm and is otherwise  homogeneous in echotexture. No uterine mass is identified. The  endometrial echo complex is generally uniform and normal in appearance  at approximately 8 mm in width.     The right ovary measures approximately 4.3 x 2.1 x 2.5 cm and contains  multiple small follicles. There is normal arterial waveform and color  Doppler flow.     Left ovary measures approximately 3.4 x 1.9 x 2.3 cm and contains  multiple small follicles. There is normal arterial waveform and color  Doppler flow. No adnexal mass or significant intrapelvic free fluid is  seen.       Impression:       Incidentally noted nabothian cysts. Otherwise negative  pelvic ultrasound. Uterus and ovaries appear within normal limits. In  particular, no visible left ovarian cyst or evidence of torsion.      DICTATED:   10/16/2019  EDITED/ls :   10/16/2019      This report was finalized on 10/16/2019 10:35 PM by DR. Onesimo Cantu MD.       CT Abdomen Pelvis Without Contrast [[id number]] Collected:  10/16/19 1319     Updated:  10/16/19 2159    Narrative:       EXAMINATION: CT ABDOMEN AND PELVIS WO CONTRAST-10/16/2019:      INDICATION: Left flank pain.     TECHNIQUE: 5 mm unenhanced images through the abdomen and pelvis.     The radiation dose reduction device was turned on for each scan per the  ALARA (As Low as Reasonably Achievable) protocol.     COMPARISON: 08/08/2018.     FINDINGS: The included lower lungs appear grossly clear. No significant  abnormalities are noted of the liver, spleen, pancreas, adrenal glands,  or right kidney. The gallbladder is surgically absent. There is slight  indistinctness of the left renal outline and mild dilatation of the  collecting  system. There is a 3 mm nonobstructing lower pole calculus.     There is dilatation of the left renal collecting system and ureter down  to the level of the distal left ureter. There are 2 calcifications at  this level, an approximately 2.5 mm calculus that appears to be external  to the ureter, and a 10 mm calculus, very closely associated with the  left ureter, however, these are both present on the 08/08/2018 exam  where there is no obstructive uropathy, and where they appear adjacent  to but distinct from the distal left ureter. Accordingly, their precise  location is uncertain.     The bladder is decompressed. The uterus and ovaries are not enlarged. No  mass or adenopathy is seen. Large and small bowel loops appear grossly  normal.       Impression:       1.  Mild left-sided obstructive uropathy, and 3 mm nonobstructing left  renal calculus.  2.  Two calcifications in the left pelvis, 10 mm and 2.5 mm in diameter,  both of which were present on the 08/08/2018 exam and appeared external  to the ureter. The larger calcification appears very closely associated  with the left ureter today, but it is questionable whether this is  actually intraureteral. Consider evaluation by urology.  3.  No other evidence of acute intra-abdominal or intrapelvic disease is  seen.     D:  10/16/2019  E:  10/16/2019     This report was finalized on 10/16/2019 9:56 PM by DR. Onesimo Cantu MD.                    Assessment/Plan       Left obstructive uropathy    Left renal calculi X2     GERD without esophagitis    Hypothyroidism on Rx     Former smoker    Class 2 obesity in adult    Alcohol use    Recent UTI    Chronic back pain      I discussed the patients findings and my recommendations with patient  Specifically, I indicated the patient has essentially normal urinalysis with no evidence of UTI. The  renal function normal on admission. CT scan comparing both studies indicates pelvic phleboliths rather than urinary calculi distally.  She does have a 2 mm left renal stone which is nonobstructing and not symptomatic. I recommended that she have followup in the future with a KUB, a Litholink, and uCT if she wishes to pursue a metabolic evaluation for recurrent stone disease. She clearly does not have left obstructive uropathy as was indicated on admission. The patient expressed her understanding of findings and recommendations. I will see her in the future if she wishes to proceed with a further workup but certainly she should have some followup regarding her asymptomatic left 2 mm renal stone.       Jasmeet Dang MD  10/17/19  8:10 AM    Time:

## 2019-10-17 NOTE — PROGRESS NOTES
Notified by RN that pt has fever and rigor, meets sepsis criteria now. Abx started empirically on admission. Will obtain blood cx x 2. Meets inpatient criteria, so will change status from obs to inpt.

## 2019-10-17 NOTE — NURSING NOTE
MD notified HR continues increase. BP dropping. C/O SOA though O2 above 96%. ECG, CXR and lactate STAT. Rocephin DC for Merrem. Continue to monitor.

## 2019-10-17 NOTE — PROGRESS NOTES
Discharge Planning Assessment  [facility]     Patient Name: Brandy Garcia  MRN: [id number]  Today's Date: 10/17/2019    Admit Date: 10/16/2019    Discharge Needs Assessment     Row Name 10/17/19 1128       Living Environment    Lives With  spouse;child(mandeep), dependent patient lives with her  and three children ages 15, 12 & 4    Current Living Arrangements  home/apartment/condo    Primary Care Provided by  self    Provides Primary Care For  child(mandeep)    Family Caregiver if Needed  spouse    Quality of Family Relationships  supportive    Able to Return to Prior Arrangements  yes       Resource/Environmental Concerns    Resource/Environmental Concerns  none    Transportation Concerns  car, none       Transition Planning    Patient/Family Anticipates Transition to  home    Patient/Family Anticipated Services at Transition  none    Transportation Anticipated  family or friend will provide       Discharge Needs Assessment    Readmission Within the Last 30 Days  no previous admission in last 30 days    Concerns to be Addressed  denies needs/concerns at this time    Equipment Currently Used at Home  none    Anticipated Changes Related to Illness  none    Equipment Needed After Discharge  none        Discharge Plan     Row Name 10/17/19 1129       Plan    Plan  Home    Patient/Family in Agreement with Plan  yes    Plan Comments  Spoke with patient at bedside.  Patient resides in [facility] and lives with her  and three children ages 15, 12 & 4.  Prior to admission patient was independent with ADL's and mobility.  Patient does not have any DME and has never used home health.  Patient plans to return home upon discharge and denies any needs.  KELLEE coburn continue to follow.        Destination      No service coordination in this encounter.      Durable Medical Equipment      No service coordination in this encounter.      Dialysis/Infusion      No service coordination in this encounter.      Home Medical  Care      No service coordination in this encounter.      Therapy      No service coordination in this encounter.      Community Resources      No service coordination in this encounter.        Expected Discharge Date and Time     Expected Discharge Date Expected Discharge Time    Oct 22, 2019         Demographic Summary     Row Name 10/17/19 1128       General Information    Admission Type  inpatient    Arrived From  home    Referral Source  admission list    Reason for Consult  discharge planning    Preferred Language  English       Contact Information    Permission Granted to Share Info With          Functional Status    No documentation.       Psychosocial    No documentation.       Abuse/Neglect    No documentation.       Legal    No documentation.       Substance Abuse    No documentation.       Patient Forms    No documentation.           Elaina Steward RN

## 2019-10-17 NOTE — PROGRESS NOTES
Progress Note    Called back to see patient regarding continued/recurrent pain a/w fever, transient hypotension and persistent tachycardia.  Currently, HR 110s, BP normal and afebrile  She does have L CVAT  She has improved with merem and IVF with pain control    We will check KUB and reassess in AM, would consider stent

## 2019-10-17 NOTE — PROGRESS NOTES
I received a call from nursing staff regarding tachycardia, hypotension, fever.  Rocephin was discontinued, meropenem was added.  Patient was given IV fluid boluses but became hypotensive again.  I spoke with Dr. Dang with urology due to concerns for septic shock and inability to gain source control of infection due to the patient's obstructive uropathy.  Dr. Dang did not feel surgical intervention was appropriate at this time.  I spoke with the intensivist on-call, Dr. Ding.  The patient was transferred to the ICU for higher level of care.

## 2019-10-18 ENCOUNTER — ANESTHESIA (OUTPATIENT)
Dept: PERIOP | Facility: HOSPITAL | Age: 39
End: 2019-10-18

## 2019-10-18 LAB
ALBUMIN SERPL-MCNC: 3.3 G/DL (ref 3.5–5.2)
ALBUMIN/GLOB SERPL: 1.2 G/DL
ALP SERPL-CCNC: 54 U/L (ref 39–117)
ALT SERPL W P-5'-P-CCNC: 40 U/L (ref 1–33)
ANION GAP SERPL CALCULATED.3IONS-SCNC: 7 MMOL/L (ref 5–15)
AST SERPL-CCNC: 19 U/L (ref 1–32)
BASOPHILS # BLD AUTO: 0.05 10*3/MM3 (ref 0–0.2)
BASOPHILS NFR BLD AUTO: 0.5 % (ref 0–1.5)
BILIRUB SERPL-MCNC: 0.4 MG/DL (ref 0.2–1.2)
BUN BLD-MCNC: 8 MG/DL (ref 6–20)
BUN/CREAT SERPL: 10.8 (ref 7–25)
CALCIUM SPEC-SCNC: 8.3 MG/DL (ref 8.6–10.5)
CHLORIDE SERPL-SCNC: 109 MMOL/L (ref 98–107)
CO2 SERPL-SCNC: 25 MMOL/L (ref 22–29)
CREAT BLD-MCNC: 0.74 MG/DL (ref 0.57–1)
DEPRECATED RDW RBC AUTO: 44.5 FL (ref 37–54)
EOSINOPHIL # BLD AUTO: 0.17 10*3/MM3 (ref 0–0.4)
EOSINOPHIL NFR BLD AUTO: 1.6 % (ref 0.3–6.2)
ERYTHROCYTE [DISTWIDTH] IN BLOOD BY AUTOMATED COUNT: 13.2 % (ref 12.3–15.4)
GFR SERPL CREATININE-BSD FRML MDRD: 88 ML/MIN/1.73
GLOBULIN UR ELPH-MCNC: 2.8 GM/DL
GLUCOSE BLD-MCNC: 96 MG/DL (ref 65–99)
HCT VFR BLD AUTO: 33.3 % (ref 34–46.6)
HGB BLD-MCNC: 10.3 G/DL (ref 12–15.9)
IMM GRANULOCYTES # BLD AUTO: 0.04 10*3/MM3 (ref 0–0.05)
IMM GRANULOCYTES NFR BLD AUTO: 0.4 % (ref 0–0.5)
LYMPHOCYTES # BLD AUTO: 1 10*3/MM3 (ref 0.7–3.1)
LYMPHOCYTES NFR BLD AUTO: 9.5 % (ref 19.6–45.3)
MAGNESIUM SERPL-MCNC: 2.3 MG/DL (ref 1.6–2.6)
MCH RBC QN AUTO: 28.4 PG (ref 26.6–33)
MCHC RBC AUTO-ENTMCNC: 30.9 G/DL (ref 31.5–35.7)
MCV RBC AUTO: 91.7 FL (ref 79–97)
MONOCYTES # BLD AUTO: 0.59 10*3/MM3 (ref 0.1–0.9)
MONOCYTES NFR BLD AUTO: 5.6 % (ref 5–12)
NEUTROPHILS # BLD AUTO: 8.66 10*3/MM3 (ref 1.7–7)
NEUTROPHILS NFR BLD AUTO: 82.4 % (ref 42.7–76)
NRBC BLD AUTO-RTO: 0 /100 WBC (ref 0–0.2)
PHOSPHATE SERPL-MCNC: 1.6 MG/DL (ref 2.5–4.5)
PHOSPHATE SERPL-MCNC: 1.8 MG/DL (ref 2.5–4.5)
PLATELET # BLD AUTO: 213 10*3/MM3 (ref 140–450)
PMV BLD AUTO: 11 FL (ref 6–12)
POTASSIUM BLD-SCNC: 3.4 MMOL/L (ref 3.5–5.2)
POTASSIUM BLD-SCNC: 4.4 MMOL/L (ref 3.5–5.2)
PROCALCITONIN SERPL-MCNC: 3.89 NG/ML (ref 0.1–0.25)
PROT SERPL-MCNC: 6.1 G/DL (ref 6–8.5)
RBC # BLD AUTO: 3.63 10*6/MM3 (ref 3.77–5.28)
SODIUM BLD-SCNC: 141 MMOL/L (ref 136–145)
WBC NRBC COR # BLD: 10.51 10*3/MM3 (ref 3.4–10.8)

## 2019-10-18 PROCEDURE — 25010000002 MEROPENEM PER 100 MG: Performed by: INTERNAL MEDICINE

## 2019-10-18 PROCEDURE — 83735 ASSAY OF MAGNESIUM: CPT | Performed by: INTERNAL MEDICINE

## 2019-10-18 PROCEDURE — 84100 ASSAY OF PHOSPHORUS: CPT | Performed by: INTERNAL MEDICINE

## 2019-10-18 PROCEDURE — 84132 ASSAY OF SERUM POTASSIUM: CPT | Performed by: INTERNAL MEDICINE

## 2019-10-18 PROCEDURE — 25010000002 KETOROLAC TROMETHAMINE PER 15 MG: Performed by: INTERNAL MEDICINE

## 2019-10-18 PROCEDURE — 99233 SBSQ HOSP IP/OBS HIGH 50: CPT | Performed by: INTERNAL MEDICINE

## 2019-10-18 PROCEDURE — 85025 COMPLETE CBC W/AUTO DIFF WBC: CPT | Performed by: INTERNAL MEDICINE

## 2019-10-18 PROCEDURE — 80053 COMPREHEN METABOLIC PANEL: CPT | Performed by: INTERNAL MEDICINE

## 2019-10-18 PROCEDURE — 25010000002 ENOXAPARIN PER 10 MG: Performed by: INTERNAL MEDICINE

## 2019-10-18 PROCEDURE — 84145 PROCALCITONIN (PCT): CPT | Performed by: INTERNAL MEDICINE

## 2019-10-18 RX ORDER — FAMOTIDINE 20 MG/1
20 TABLET, FILM COATED ORAL ONCE
Status: DISCONTINUED | OUTPATIENT
Start: 2019-10-18 | End: 2019-10-20 | Stop reason: HOSPADM

## 2019-10-18 RX ORDER — ACETAMINOPHEN, ASPIRIN AND CAFFEINE 250; 250; 65 MG/1; MG/1; MG/1
2 TABLET, FILM COATED ORAL EVERY 6 HOURS PRN
Status: DISCONTINUED | OUTPATIENT
Start: 2019-10-18 | End: 2019-10-18

## 2019-10-18 RX ORDER — ONDANSETRON 2 MG/ML
4 INJECTION INTRAMUSCULAR; INTRAVENOUS ONCE AS NEEDED
Status: CANCELLED | OUTPATIENT
Start: 2019-10-18

## 2019-10-18 RX ORDER — SODIUM CHLORIDE, SODIUM LACTATE, POTASSIUM CHLORIDE, CALCIUM CHLORIDE 600; 310; 30; 20 MG/100ML; MG/100ML; MG/100ML; MG/100ML
9 INJECTION, SOLUTION INTRAVENOUS CONTINUOUS
Status: DISCONTINUED | OUTPATIENT
Start: 2019-10-18 | End: 2019-10-19

## 2019-10-18 RX ORDER — POTASSIUM CHLORIDE 7.45 MG/ML
10 INJECTION INTRAVENOUS
Status: DISCONTINUED | OUTPATIENT
Start: 2019-10-18 | End: 2019-10-20 | Stop reason: HOSPADM

## 2019-10-18 RX ORDER — KETOROLAC TROMETHAMINE 30 MG/ML
30 INJECTION, SOLUTION INTRAMUSCULAR; INTRAVENOUS EVERY 6 HOURS PRN
Status: DISCONTINUED | OUTPATIENT
Start: 2019-10-18 | End: 2019-10-20 | Stop reason: HOSPADM

## 2019-10-18 RX ORDER — HYDROMORPHONE HYDROCHLORIDE 1 MG/ML
0.5 INJECTION, SOLUTION INTRAMUSCULAR; INTRAVENOUS; SUBCUTANEOUS
Status: CANCELLED | OUTPATIENT
Start: 2019-10-18

## 2019-10-18 RX ORDER — POTASSIUM CHLORIDE 1.5 G/1.77G
40 POWDER, FOR SOLUTION ORAL AS NEEDED
Status: DISCONTINUED | OUTPATIENT
Start: 2019-10-18 | End: 2019-10-20 | Stop reason: HOSPADM

## 2019-10-18 RX ORDER — DOCUSATE SODIUM 100 MG/1
100 CAPSULE, LIQUID FILLED ORAL DAILY PRN
Status: DISCONTINUED | OUTPATIENT
Start: 2019-10-18 | End: 2019-10-20 | Stop reason: HOSPADM

## 2019-10-18 RX ORDER — ACETAMINOPHEN, ASPIRIN AND CAFFEINE 250; 250; 65 MG/1; MG/1; MG/1
2 TABLET, FILM COATED ORAL ONCE
Status: COMPLETED | OUTPATIENT
Start: 2019-10-18 | End: 2019-10-18

## 2019-10-18 RX ORDER — POTASSIUM CHLORIDE 750 MG/1
40 CAPSULE, EXTENDED RELEASE ORAL AS NEEDED
Status: DISCONTINUED | OUTPATIENT
Start: 2019-10-18 | End: 2019-10-20 | Stop reason: HOSPADM

## 2019-10-18 RX ORDER — LIDOCAINE HYDROCHLORIDE 10 MG/ML
0.5 INJECTION, SOLUTION EPIDURAL; INFILTRATION; INTRACAUDAL; PERINEURAL ONCE AS NEEDED
Status: DISCONTINUED | OUTPATIENT
Start: 2019-10-18 | End: 2019-10-20 | Stop reason: HOSPADM

## 2019-10-18 RX ORDER — SODIUM CHLORIDE 0.9 % (FLUSH) 0.9 %
3-10 SYRINGE (ML) INJECTION AS NEEDED
Status: DISCONTINUED | OUTPATIENT
Start: 2019-10-18 | End: 2019-10-20 | Stop reason: HOSPADM

## 2019-10-18 RX ORDER — FENTANYL CITRATE 50 UG/ML
50 INJECTION, SOLUTION INTRAMUSCULAR; INTRAVENOUS
Status: CANCELLED | OUTPATIENT
Start: 2019-10-18

## 2019-10-18 RX ORDER — SODIUM CHLORIDE 0.9 % (FLUSH) 0.9 %
3 SYRINGE (ML) INJECTION EVERY 12 HOURS SCHEDULED
Status: DISCONTINUED | OUTPATIENT
Start: 2019-10-18 | End: 2019-10-20 | Stop reason: HOSPADM

## 2019-10-18 RX ADMIN — POTASSIUM CHLORIDE 40 MEQ: 750 CAPSULE, EXTENDED RELEASE ORAL at 10:22

## 2019-10-18 RX ADMIN — SENNOSIDES AND DOCUSATE SODIUM 2 TABLET: 8.6; 5 TABLET ORAL at 15:33

## 2019-10-18 RX ADMIN — MEROPENEM 1 G: 1 INJECTION INTRAVENOUS at 15:24

## 2019-10-18 RX ADMIN — KETOROLAC TROMETHAMINE 30 MG: 30 INJECTION, SOLUTION INTRAMUSCULAR at 15:46

## 2019-10-18 RX ADMIN — PANTOPRAZOLE SODIUM 40 MG: 40 TABLET, DELAYED RELEASE ORAL at 06:16

## 2019-10-18 RX ADMIN — ACETAMINOPHEN 650 MG: 325 TABLET ORAL at 00:40

## 2019-10-18 RX ADMIN — ENOXAPARIN SODIUM 40 MG: 40 INJECTION SUBCUTANEOUS at 22:47

## 2019-10-18 RX ADMIN — SODIUM CHLORIDE, PRESERVATIVE FREE 3 ML: 5 INJECTION INTRAVENOUS at 22:48

## 2019-10-18 RX ADMIN — ACETAMINOPHEN, ASPIRIN AND CAFFEINE 1 TABLET: 250; 250; 65 TABLET, FILM COATED ORAL at 04:31

## 2019-10-18 RX ADMIN — OXYCODONE HYDROCHLORIDE AND ACETAMINOPHEN 1 TABLET: 7.5; 325 TABLET ORAL at 06:43

## 2019-10-18 RX ADMIN — MEROPENEM 1 G: 1 INJECTION INTRAVENOUS at 08:38

## 2019-10-18 RX ADMIN — IBUPROFEN 400 MG: 400 TABLET ORAL at 22:47

## 2019-10-18 RX ADMIN — LEVOTHYROXINE SODIUM 75 MCG: 75 CAPSULE ORAL at 06:17

## 2019-10-18 RX ADMIN — SODIUM CHLORIDE, POTASSIUM CHLORIDE, SODIUM LACTATE AND CALCIUM CHLORIDE 50 ML/HR: 600; 310; 30; 20 INJECTION, SOLUTION INTRAVENOUS at 15:25

## 2019-10-18 RX ADMIN — POLYETHYLENE GLYCOL 3350 17 G: 17 POWDER, FOR SOLUTION ORAL at 22:47

## 2019-10-18 RX ADMIN — OXYCODONE HYDROCHLORIDE AND ACETAMINOPHEN 1 TABLET: 7.5; 325 TABLET ORAL at 12:14

## 2019-10-18 RX ADMIN — POTASSIUM CHLORIDE 40 MEQ: 750 CAPSULE, EXTENDED RELEASE ORAL at 15:32

## 2019-10-18 RX ADMIN — MEROPENEM 1 G: 1 INJECTION INTRAVENOUS at 00:20

## 2019-10-18 RX ADMIN — POTASSIUM PHOSPHATE, MONOBASIC AND POTASSIUM PHOSPHATE, DIBASIC 15 MMOL: 224; 236 INJECTION, SOLUTION, CONCENTRATE INTRAVENOUS at 10:12

## 2019-10-18 NOTE — PLAN OF CARE
Problem: Patient Care Overview  Goal: Plan of Care Review   10/17/19 2007   Coping/Psychosocial   Plan of Care Reviewed With patient;family   Plan of Care Review   Progress no change   OTHER   Outcome Summary Patient temperature fluctuated during shift. Pain medication modified per urology. NPO after midnight for possible AM procedure. Emesis x2, UOP >1L. Patient tachycardic at times. Merrem continued. Tranfer to floor cancelled d/t persistent fever. LR decreased to 50 per patient request. OOBTC x 3 hours.        Problem: Skin Injury Risk (Adult)  Goal: Skin Health and Integrity  Outcome: Ongoing (interventions implemented as appropriate)

## 2019-10-18 NOTE — PLAN OF CARE
Problem: Patient Care Overview  Goal: Plan of Care Review  Outcome: Ongoing (interventions implemented as appropriate)   10/18/19 1577   Coping/Psychosocial   Plan of Care Reviewed With patient   Plan of Care Review   Progress no change   OTHER   Outcome Summary VSS on RA. Temp max 100.2 oral. (Improved with bath). Good po intake and uop.LR continues at 50 ml/hr. Sediment noted in urine and 1 small white sediment vs stone collected. Patient continues to complain of intermittent nausea and had one episode of emesis. Pt refuses zofran. (She feels it may be giving her a headache.) Pt complains of LLQ pain rated 2-4/10 throughout the night that spread to her pelvis this am. She also also complains of headache. Tylenol given with no improvement. (Pt refuses pain meds as she feels that may be contributing to nausea.)  Excedrin given and ice packs applied to neck and eyes. At 0645, pt decided to take 1 percocet and did have some improvement in pain.  Pt npo after midnight for potential procedure.        Problem: Skin Injury Risk (Adult)  Goal: Skin Health and Integrity  Outcome: Ongoing (interventions implemented as appropriate)      Problem: Sepsis/Septic Shock (Adult)  Goal: Signs and Symptoms of Listed Potential Problems Will be Absent, Minimized or Managed (Sepsis/Septic Shock)  Outcome: Ongoing (interventions implemented as appropriate)

## 2019-10-18 NOTE — PLAN OF CARE
Problem: Patient Care Overview  Goal: Plan of Care Review  Outcome: Ongoing (interventions implemented as appropriate)   10/18/19 7688   OTHER   Outcome Summary VSS UOP ADQ, up in chair, urine strained, pain, nausea managed with prn meds, will continue to monitor     Goal: Discharge Needs Assessment  Outcome: Ongoing (interventions implemented as appropriate)      Problem: Skin Injury Risk (Adult)  Goal: Skin Health and Integrity  Outcome: Ongoing (interventions implemented as appropriate)      Problem: Sepsis/Septic Shock (Adult)  Goal: Signs and Symptoms of Listed Potential Problems Will be Absent, Minimized or Managed (Sepsis/Septic Shock)  Outcome: Ongoing (interventions implemented as appropriate)

## 2019-10-18 NOTE — PROGRESS NOTES
INTENSIVIST / PULMONARY FOLLOW UP NOTE     Hospital:  LOS: 2 days   Ms. Brandy Garcia, 39 y.o. female is followed for:     Left obstructive uropathy    Left renal calculi X2     GERD without esophagitis    Hypothyroidism on Rx     Former smoker    Class 2 obesity in adult    Alcohol use    Recent UTI    Chronic back pain          SUBJECTIVE   Pain improved    The patient's relevant past medical, surgical, family, and social history were reviewed    Allergies and medications were reviewed    ROS:  Per subjective, all other systems were reviewed and were negative        OBJECTIVE     Vital Sign Min/Max for last 24 hours:  Temp  Min: 98.6 °F (37 °C)  Max: 101.3 °F (38.5 °C)   BP  Min: 105/75  Max: 159/96   Pulse  Min: 68  Max: 118   Resp  Min: 14  Max: 20   SpO2  Min: 91 %  Max: 100 %   No Data Recorded     Physical Exam:  General Appearance:  Conversant, in no acute distress  Eyes:  No scleral icterus or pallor, pupils normal  Ears, Nose, Mouth, Throat:  Atraumatic, oropharynx clear  Neck:  Trachea midline, thyroid normal  Respiratory:  Clear to auscultation bilaterally, normal effort, no tenderness to palpation  Cardiovascular:  Regular rate and rhythm, no murmurs, no peripheral edema, no thrill  Gastrointestinal:  Soft, non-tender, non-distended, no hepatosplenomegaly  Skin:  Normal temperature, no rash  Psychiatric:  Alert and oriented x 3, normal judgement and insight  Neuro:  No new focal neurologic deficits observed    Telemetry:              Hemodynamics:   CVP:     PAP:     PAOP:     CO:     CI:     SVI:     SVR:       SpO2: 97 % SpO2  Min: 91 %  Max: 100 %   Device:      Flow Rate:   No Data Recorded       Intake/Ouptut 24 hrs (7:00AM - 6:59 AM)  Intake & Output (last 3 days)       10/15 0701 - 10/16 0700 10/16 0701 - 10/17 0700 10/17 0701 - 10/18 0700 10/18 0701 - 10/19 0700    P.O.  60 1200     I.V. (mL/kg)  222 (1.7) 1249.4 (9.8) 97 (0.8)    IV Piggyback  2100 300     Total Intake(mL/kg)  2382 (18.6)  2749.4 (21.5) 97 (0.8)    Urine (mL/kg/hr)  2200 2625 (0.9) 500 (0.8)    Emesis/NG output   0     Total Output  2200 2625 500    Net  +182 +124.4 -403            Emesis Unmeasured Occurrence   3 x           Lines, Drains & Airways    Active LDAs     Name:   Placement date:   Placement time:   Site:   Days:    Peripheral IV 10/16/19 1134 Right Antecubital   10/16/19    1134    Antecubital   2    Peripheral IV 10/17/19 0500 Posterior;Right Hand   10/17/19    0500    Hand   1                Hematology:  Results from last 7 days   Lab Units 10/18/19  0430 10/17/19  0537 10/17/19  0319 10/16/19  1136   WBC 10*3/mm3 10.51 19.14* 19.50* 6.90   HEMOGLOBIN g/dL 10.3* 10.2* 10.4* 12.5   HEMATOCRIT % 33.3* 31.9* 32.8* 38.9   PLATELETS 10*3/mm3 213 265 276 415   MONOCYTES % %  --   --  3.0*  --      Electrolytes, Magnesium and Phosphorus:  Results from last 7 days   Lab Units 10/18/19  0730 10/17/19  0447 10/16/19  2028 10/16/19  1136   SODIUM mmol/L 141  --  139 141   CHLORIDE mmol/L 109*  --  106 105   POTASSIUM mmol/L 3.4*  --  4.0 3.9   CO2 mmol/L 25.0  --  21.0* 26.0   MAGNESIUM mg/dL 2.3 1.6  --   --    PHOSPHORUS mg/dL 1.8* 3.2  --   --      Renal:  Results from last 7 days   Lab Units 10/18/19  0730 10/16/19  2028 10/16/19  1136   CREATININE mg/dL 0.74 1.09* 0.82   BUN mg/dL 8 13 12     Estimated Creatinine Clearance: 143.5 mL/min (by C-G formula based on SCr of 0.74 mg/dL).  Hepatic:  Results from last 7 days   Lab Units 10/18/19  0730 10/16/19  1136   ALK PHOS U/L 54 64   BILIRUBIN mg/dL 0.4 0.2   ALT (SGPT) U/L 40* 31   AST (SGOT) U/L 19 19     Arterial Blood Gases:              Lab Results   Component Value Date    LACTATE 2.0 10/17/2019       Relevant imaging studies and labs from 10/18/19 were reviewed and interpreted by me    Medications (drips):    lactated ringers Last Rate: 50 mL/hr (10/17/19 1955)   lactated ringers          enoxaparin 40 mg Subcutaneous Q24H   famotidine 20 mg Oral Once   levothyroxine  sodium 75 mcg Oral Q AM   meropenem 1 g Intravenous Q8H   pantoprazole 40 mg Oral QAM   sodium chloride 3 mL Intravenous Q12H   tamsulosin 0.4 mg Oral Daily       Assessment/Plan   IMPRESSION / PLAN     Inpatient Problem List:  39 y.o.female:  Active Hospital Problems    Diagnosis   • **Left obstructive uropathy   • Former smoker   • Class 2 obesity in adult   • Alcohol use   • Recent UTI   • Chronic back pain     Has trialed PT, oral analgesics, and steroid injections in the past  Previously evaluated by Dr. Hansen without need for intervention      • GERD without esophagitis   • Hypothyroidism on Rx    • Left renal calculi X2      10mm and 2.5mm previously noted on 2018 CT imaging           Impression:  39 y.o.female with relevant PMH of GERD, Hypothyroidism, Nephrolithiasis admitted 10/16/2019 sepsis, nephrolithiasis, UTI (E. Coli)    Plan:  Sepsis / UTI - continue kimberlee, continue IVF, BP / Fever improved    Nephrolithiasis - pain / nausea control, plan per Urology    To med-surg    Nutrition - NPO Diet    Plan of care and goals reviewed with mulitdisciplinary team at daily rounds         Jourdan Aburto MD  Intensive Care Medicine  10/18/19 11:49 AM

## 2019-10-18 NOTE — PROGRESS NOTES
Progress Note    Now afVSS, feels much better with minimal pain only  KUB shows L pelvic calcification - difficult to conclude if ureteral or not    Rec. Cont antbx per culture results, ok to D/C with rx for percocet and antibxc  I will see in office on Teusday

## 2019-10-18 NOTE — PROGRESS NOTES
Clinical Nutrition     Multidisciplinary Rounds      Patient Name: Brandy Garcia  Date of Encounter: 10/18/19 9:26 AM  MRN: [id number]  Admission date: 10/16/2019      Reason for visit: MDR. RD to continue to follow per protocol.     Additional information obtained: Plan for potential intervention pending.     Current diet: NPO Diet    EMR reviewed     Intervention:  Follow treatment plan  Care plan reviewed    Follow up:   Per protocol      Hillary Draper RDN, JOSEE  9:26 AM  Time: 10min

## 2019-10-18 NOTE — PROGRESS NOTES
Continued Stay Note  [facility]     Patient Name: Brandy Garcia  MRN: [id number]  Today's Date: 10/18/2019    Admit Date: 10/16/2019    Discharge Plan     Row Name 10/18/19 1139       Plan    Plan  Home    Plan Comments  Plan remains home upon discharge.   No needs identified.  CM will continue to follow.        Discharge Codes    No documentation.       Expected Discharge Date and Time     Expected Discharge Date Expected Discharge Time    Oct 22, 2019             Elaina Steward RN

## 2019-10-19 ENCOUNTER — APPOINTMENT (OUTPATIENT)
Dept: GENERAL RADIOLOGY | Facility: HOSPITAL | Age: 39
End: 2019-10-19

## 2019-10-19 PROBLEM — A41.51 SEPSIS DUE TO ESCHERICHIA COLI (HCC): Status: ACTIVE | Noted: 2019-10-19

## 2019-10-19 LAB
ALBUMIN SERPL-MCNC: 2.9 G/DL (ref 3.5–5.2)
ALBUMIN/GLOB SERPL: 0.9 G/DL
ALP SERPL-CCNC: 58 U/L (ref 39–117)
ALT SERPL W P-5'-P-CCNC: 29 U/L (ref 1–33)
ANION GAP SERPL CALCULATED.3IONS-SCNC: 8 MMOL/L (ref 5–15)
AST SERPL-CCNC: 16 U/L (ref 1–32)
BACTERIA SPEC AEROBE CULT: ABNORMAL
BASOPHILS # BLD AUTO: 0.04 10*3/MM3 (ref 0–0.2)
BASOPHILS NFR BLD AUTO: 0.6 % (ref 0–1.5)
BILIRUB SERPL-MCNC: 0.2 MG/DL (ref 0.2–1.2)
BUN BLD-MCNC: 9 MG/DL (ref 6–20)
BUN/CREAT SERPL: 13.6 (ref 7–25)
CALCIUM SPEC-SCNC: 8.4 MG/DL (ref 8.6–10.5)
CHLORIDE SERPL-SCNC: 108 MMOL/L (ref 98–107)
CO2 SERPL-SCNC: 24 MMOL/L (ref 22–29)
CREAT BLD-MCNC: 0.66 MG/DL (ref 0.57–1)
DEPRECATED RDW RBC AUTO: 42.2 FL (ref 37–54)
EOSINOPHIL # BLD AUTO: 0.26 10*3/MM3 (ref 0–0.4)
EOSINOPHIL NFR BLD AUTO: 3.8 % (ref 0.3–6.2)
ERYTHROCYTE [DISTWIDTH] IN BLOOD BY AUTOMATED COUNT: 13.1 % (ref 12.3–15.4)
GFR SERPL CREATININE-BSD FRML MDRD: 100 ML/MIN/1.73
GLOBULIN UR ELPH-MCNC: 3.3 GM/DL
GLUCOSE BLD-MCNC: 94 MG/DL (ref 65–99)
HCT VFR BLD AUTO: 30.4 % (ref 34–46.6)
HGB BLD-MCNC: 9.7 G/DL (ref 12–15.9)
IMM GRANULOCYTES # BLD AUTO: 0.02 10*3/MM3 (ref 0–0.05)
IMM GRANULOCYTES NFR BLD AUTO: 0.3 % (ref 0–0.5)
LYMPHOCYTES # BLD AUTO: 1.76 10*3/MM3 (ref 0.7–3.1)
LYMPHOCYTES NFR BLD AUTO: 25.5 % (ref 19.6–45.3)
MAGNESIUM SERPL-MCNC: 2 MG/DL (ref 1.6–2.6)
MCH RBC QN AUTO: 28.3 PG (ref 26.6–33)
MCHC RBC AUTO-ENTMCNC: 31.9 G/DL (ref 31.5–35.7)
MCV RBC AUTO: 88.6 FL (ref 79–97)
MONOCYTES # BLD AUTO: 0.71 10*3/MM3 (ref 0.1–0.9)
MONOCYTES NFR BLD AUTO: 10.3 % (ref 5–12)
NEUTROPHILS # BLD AUTO: 4.1 10*3/MM3 (ref 1.7–7)
NEUTROPHILS NFR BLD AUTO: 59.5 % (ref 42.7–76)
NRBC BLD AUTO-RTO: 0 /100 WBC (ref 0–0.2)
PHOSPHATE SERPL-MCNC: 2.4 MG/DL (ref 2.5–4.5)
PHOSPHATE SERPL-MCNC: 3.3 MG/DL (ref 2.5–4.5)
PLAT MORPH BLD: NORMAL
PLATELET # BLD AUTO: 245 10*3/MM3 (ref 140–450)
PMV BLD AUTO: 10.1 FL (ref 6–12)
POTASSIUM BLD-SCNC: 4.3 MMOL/L (ref 3.5–5.2)
PROT SERPL-MCNC: 6.2 G/DL (ref 6–8.5)
RBC # BLD AUTO: 3.43 10*6/MM3 (ref 3.77–5.28)
RBC MORPH BLD: NORMAL
SODIUM BLD-SCNC: 140 MMOL/L (ref 136–145)
WBC MORPH BLD: NORMAL
WBC NRBC COR # BLD: 6.89 10*3/MM3 (ref 3.4–10.8)

## 2019-10-19 PROCEDURE — 71045 X-RAY EXAM CHEST 1 VIEW: CPT

## 2019-10-19 PROCEDURE — 84100 ASSAY OF PHOSPHORUS: CPT | Performed by: INTERNAL MEDICINE

## 2019-10-19 PROCEDURE — 99233 SBSQ HOSP IP/OBS HIGH 50: CPT | Performed by: HOSPITALIST

## 2019-10-19 PROCEDURE — 83735 ASSAY OF MAGNESIUM: CPT | Performed by: INTERNAL MEDICINE

## 2019-10-19 PROCEDURE — 85007 BL SMEAR W/DIFF WBC COUNT: CPT | Performed by: INTERNAL MEDICINE

## 2019-10-19 PROCEDURE — 85025 COMPLETE CBC W/AUTO DIFF WBC: CPT | Performed by: INTERNAL MEDICINE

## 2019-10-19 PROCEDURE — 74018 RADEX ABDOMEN 1 VIEW: CPT

## 2019-10-19 PROCEDURE — 25010000002 ONDANSETRON PER 1 MG: Performed by: HOSPITALIST

## 2019-10-19 PROCEDURE — 80053 COMPREHEN METABOLIC PANEL: CPT | Performed by: INTERNAL MEDICINE

## 2019-10-19 PROCEDURE — 25010000002 CEFTRIAXONE PER 250 MG: Performed by: HOSPITALIST

## 2019-10-19 PROCEDURE — 84100 ASSAY OF PHOSPHORUS: CPT | Performed by: HOSPITALIST

## 2019-10-19 PROCEDURE — 25010000002 MEROPENEM PER 100 MG: Performed by: INTERNAL MEDICINE

## 2019-10-19 PROCEDURE — 25010000002 ENOXAPARIN PER 10 MG: Performed by: INTERNAL MEDICINE

## 2019-10-19 RX ORDER — BISACODYL 10 MG
10 SUPPOSITORY, RECTAL RECTAL ONCE
Status: COMPLETED | OUTPATIENT
Start: 2019-10-19 | End: 2019-10-19

## 2019-10-19 RX ORDER — POLYETHYLENE GLYCOL 3350 17 G/17G
17 POWDER, FOR SOLUTION ORAL ONCE
Status: COMPLETED | OUTPATIENT
Start: 2019-10-19 | End: 2019-10-19

## 2019-10-19 RX ORDER — MAGNESIUM CARB/ALUMINUM HYDROX 105-160MG
150 TABLET,CHEWABLE ORAL ONCE
Status: COMPLETED | OUTPATIENT
Start: 2019-10-19 | End: 2019-10-19

## 2019-10-19 RX ORDER — ONDANSETRON 2 MG/ML
4 INJECTION INTRAMUSCULAR; INTRAVENOUS ONCE
Status: COMPLETED | OUTPATIENT
Start: 2019-10-19 | End: 2019-10-19

## 2019-10-19 RX ORDER — LISINOPRIL 5 MG/1
5 TABLET ORAL
Status: DISCONTINUED | OUTPATIENT
Start: 2019-10-19 | End: 2019-10-20 | Stop reason: HOSPADM

## 2019-10-19 RX ORDER — SIMETHICONE 80 MG
80 TABLET,CHEWABLE ORAL 4 TIMES DAILY PRN
Status: DISCONTINUED | OUTPATIENT
Start: 2019-10-19 | End: 2019-10-20 | Stop reason: HOSPADM

## 2019-10-19 RX ADMIN — BISACODYL 10 MG: 10 SUPPOSITORY RECTAL at 13:49

## 2019-10-19 RX ADMIN — DOCUSATE SODIUM 100 MG: 100 CAPSULE, LIQUID FILLED ORAL at 01:30

## 2019-10-19 RX ADMIN — SIMETHICONE CHEW TAB 80 MG 80 MG: 80 TABLET ORAL at 16:52

## 2019-10-19 RX ADMIN — POLYETHYLENE GLYCOL 3350 17 G: 17 POWDER, FOR SOLUTION ORAL at 16:49

## 2019-10-19 RX ADMIN — SODIUM PHOSPHATE, MONOBASIC, MONOHYDRATE 15 MMOL: 276; 142 INJECTION, SOLUTION INTRAVENOUS at 20:21

## 2019-10-19 RX ADMIN — SODIUM CHLORIDE, PRESERVATIVE FREE 3 ML: 5 INJECTION INTRAVENOUS at 20:38

## 2019-10-19 RX ADMIN — TAMSULOSIN HYDROCHLORIDE 0.4 MG: 0.4 CAPSULE ORAL at 09:17

## 2019-10-19 RX ADMIN — IBUPROFEN 400 MG: 400 TABLET ORAL at 21:54

## 2019-10-19 RX ADMIN — ENOXAPARIN SODIUM 40 MG: 40 INJECTION SUBCUTANEOUS at 20:20

## 2019-10-19 RX ADMIN — IBUPROFEN 400 MG: 400 TABLET ORAL at 05:51

## 2019-10-19 RX ADMIN — MEROPENEM 1 G: 1 INJECTION INTRAVENOUS at 09:16

## 2019-10-19 RX ADMIN — PANTOPRAZOLE SODIUM 40 MG: 40 TABLET, DELAYED RELEASE ORAL at 05:47

## 2019-10-19 RX ADMIN — SODIUM PHOSPHATE, MONOBASIC, MONOHYDRATE 30 MMOL: 276; 142 INJECTION, SOLUTION INTRAVENOUS at 02:28

## 2019-10-19 RX ADMIN — MEROPENEM 1 G: 1 INJECTION INTRAVENOUS at 00:08

## 2019-10-19 RX ADMIN — LEVOTHYROXINE SODIUM 75 MCG: 75 CAPSULE ORAL at 05:46

## 2019-10-19 RX ADMIN — Medication 150 ML: at 21:49

## 2019-10-19 RX ADMIN — SIMETHICONE CHEW TAB 80 MG 80 MG: 80 TABLET ORAL at 01:30

## 2019-10-19 RX ADMIN — CEFTRIAXONE 2 G: 2 INJECTION, POWDER, FOR SOLUTION INTRAMUSCULAR; INTRAVENOUS at 13:49

## 2019-10-19 RX ADMIN — LISINOPRIL 5 MG: 5 TABLET ORAL at 13:49

## 2019-10-19 RX ADMIN — ONDANSETRON 4 MG: 2 INJECTION INTRAMUSCULAR; INTRAVENOUS at 13:49

## 2019-10-19 NOTE — NURSING NOTE
"RN called into room. Pt c/o SOA. Asked when this started.  Pt states irritatedly,  \"I told you before I am bloated and it's pressing on my diaphragm.The only way I can breath is if I sit straight up. I'm anxious bc I can't breath.\" Pt states does not take any meds for history of anxiety. O2 92% on RA. Pt admi APRN paged.  See orders.   "

## 2019-10-19 NOTE — PLAN OF CARE
Problem: Patient Care Overview  Goal: Plan of Care Review  Outcome: Ongoing (interventions implemented as appropriate)   10/19/19 1822   Coping/Psychosocial   Plan of Care Reviewed With patient   Plan of Care Review   Progress no change   OTHER   Outcome Summary VSS. Patient c/o SOB, felt like it was from pressure from abdomen, treated with mylicon and she stated she felt better. Patient has c/o constipation, miralax and colace given. Will continue to monitor.        Problem: Skin Injury Risk (Adult)  Goal: Skin Health and Integrity  Outcome: Ongoing (interventions implemented as appropriate)   10/19/19 1822   Skin Injury Risk (Adult)   Skin Health and Integrity making progress toward outcome

## 2019-10-19 NOTE — PROGRESS NOTES
[facility] Medicine Services  PROGRESS NOTE    Patient Name: Brandy Garcia  : 1980  MRN: [id number]    Date of Admission: 10/16/2019  Primary Care Physician: Emily Solano APRN    Subjective   Subjective     CC:  Abdominal pain    HPI:  Pt sitting up in bed, in mild distress with reports of a ~24 hour hx of moderately severe abdominal fullness with epigastric pain associated with some nausea (none during my visit) but no vomiting. Passing small amounts of gas and had some small BMs this AM but did not feel like she emptied well. No recurrent flank pain. No dysuria or hematuria.     Review of Systems  Gen- No fevers, mild chills  CV- No chest pain, palpitations  Resp- No cough, mild dyspnea from abdominal pain   GI- as above     Objective   Objective     Vital Signs:   Temp:  [98.4 °F (36.9 °C)-99.9 °F (37.7 °C)] 99 °F (37.2 °C)  Heart Rate:  [56-86] 56  Resp:  [16-18] 18  BP: (119-164)/(76-93) 164/93     Physical Exam:  Constitutional: mild distress, awake, alert  HENT: NCAT, mucous membranes moist  Respiratory: Clear to auscultation bilaterally with incomplete expansion, respiratory effort normal   Cardiovascular: RRR, no murmurs, rubs, or gallops, palpable pedal pulses bilaterally  Gastrointestinal: Hypoactive bowel sounds, soft, mild epigastric tenderness, nondistended, no guarding or rebound. No flank pain   Musculoskeletal: No bilateral ankle edema  Psychiatric: Appropriate affect, cooperative  Neurologic: Oriented x 3, strength symmetric in all extremities, speech clear  Skin: No rashes to abdominal wall     Results Reviewed:    Results from last 7 days   Lab Units 10/19/19  0700 10/18/19  0730 10/18/19  0430 10/17/19  0537 10/17/19  0447   WBC 10*3/mm3 6.89  --  10.51 19.14*  --    HEMOGLOBIN g/dL 9.7*  --  10.3* 10.2*  --    HEMATOCRIT % 30.4*  --  33.3* 31.9*  --    PLATELETS 10*3/mm3 245  --  213 265  --    PROCALCITONIN ng/mL  --  3.89*  --   --  0.88*     Results  from last 7 days   Lab Units 10/19/19  0700 10/18/19  2301 10/18/19  0730 10/16/19  2028 10/16/19  1136   SODIUM mmol/L 140  --  141 139 141   POTASSIUM mmol/L 4.3 4.4 3.4* 4.0 3.9   CHLORIDE mmol/L 108*  --  109* 106 105   CO2 mmol/L 24.0  --  25.0 21.0* 26.0   BUN mg/dL 9  --  8 13 12   CREATININE mg/dL 0.66  --  0.74 1.09* 0.82   GLUCOSE mg/dL 94  --  96 93 102*   CALCIUM mg/dL 8.4*  --  8.3* 8.8 9.5   ALT (SGPT) U/L 29  --  40*  --  31   AST (SGOT) U/L 16  --  19  --  19     Estimated Creatinine Clearance: 160.9 mL/min (by C-G formula based on SCr of 0.66 mg/dL).    Microbiology Results Abnormal     Procedure Component Value - Date/Time    Urine Culture - Urine, Urine, Clean Catch [[id number]]  (Abnormal)  (Susceptibility) Collected:  10/16/19 1128    Lab Status:  Final result Specimen:  Urine, Clean Catch Updated:  10/19/19 0333     Urine Culture >100,000 CFU/mL Escherichia coli    Susceptibility      Escherichia coli     KAITLYNN     Ampicillin Resistant     Ampicillin + Sulbactam Intermediate     Cefazolin Susceptible     Cefepime Susceptible     Ceftazidime Susceptible     Ceftriaxone Susceptible     Gentamicin Susceptible     Levofloxacin Resistant     Nitrofurantoin Susceptible     Piperacillin + Tazobactam Susceptible     Tetracycline Susceptible     Trimethoprim + Sulfamethoxazole Susceptible                    Blood Culture - Blood, Hand, Left [[id number]] Collected:  10/16/19 2028    Lab Status:  Preliminary result Specimen:  Blood from Hand, Left Updated:  10/18/19 2124     Blood Culture No growth at 2 days    Narrative:       Aerobic only    Blood Culture - Blood, Arm, Left [[id number]] Collected:  10/16/19 2028    Lab Status:  Preliminary result Specimen:  Blood from Arm, Left Updated:  10/18/19 2124     Blood Culture No growth at 2 days          Imaging Results (last 24 hours)     Procedure Component Value Units Date/Time    XR Abdomen KUB [[id number]] Collected:  10/17/19 1941     Updated:  10/19/19 0910     Narrative:          EXAMINATION: XR ABDOMEN KUB - 10/17/2019     INDICATION:  R52-Pain, unspecified; N23-Unspecified renal colic.      COMPARISON: Abdominal CT scan 10/16/2019.     FINDINGS: Previous exam report showed 10 mm and 2.5 mm calcifications in  the pelvis near the left ureter, but also present on earlier scan from  2018, of uncertain significance. Small left renal calculus is also  noted.     Today's exam again shows the 10 mm calculus and a much smaller adjacent  calculus superimposed over the left lateral aspect of the bladder in or  near the expected location of the left ureterovesical junction. The  patient's small left renal calculus is not visible. No other  calcification is  appreciated. Bowel gas pattern is nonobstructive.  Clips are noted in the gallbladder fossa.        Impression:       Persistent calcifications near the expected location of the  left ureterovesical junction, as on previous CT scan from 10/16/2019.  Nonobstructive bowel gas pattern.     DICTATED:   10/17/2019  EDITED/ls :   10/17/2019      This report was finalized on 10/19/2019 9:06 AM by DR. Onesimo Cantu MD.       XR Abdomen KUB [[id number]] Collected:  10/19/19 0201     Updated:  10/19/19 0203    Narrative:       CR Abdomen 1 Vw    INDICATION:   Abdominal pain and distention for the past 2 days    COMPARISON:   10/17/2019    FINDINGS:  AP radiographs of the abdomen. Left-sided pelvic calcifications are again noted and unchanged. One measures 10 mm and the other measures 2 mm. The calcifications are unchanged.    The bowel gas pattern is nonobstructive. No acute osseous abnormalities.      Impression:       Left-sided pelvic calcifications are unchanged from the previous examination. Normal bowel gas pattern.    Signer Name: Yoel Marques MD   Signed: 10/19/2019 2:01 AM   Workstation Name: [id number]    [facility]    XR Chest 1 View [[id number]] Collected:  10/19/19 0037     Updated:  10/19/19 0039     Narrative:       CR Chest 1 Vw    INDICATION:   Shortness of breath and chest pain beginning prior to arrival     COMPARISON:    10/17/2019    FINDINGS:  Single portable AP view(s) of the chest.        There is mild cardiomegaly that is unchanged. The lungs are clear. Vascular markings are normal. No effusions are seen.       Impression:       Mild cardiomegaly. No active disease.    Signer Name: Yoel Marques MD   Signed: 10/19/2019 12:37 AM   Workstation Name: [id number]    [facility]          I have reviewed the medications:  Scheduled Meds:  bisacodyl 10 mg Rectal Once   ceftriaxone 2 g Intravenous Q24H   enoxaparin 40 mg Subcutaneous Q24H   famotidine 20 mg Oral Once   levothyroxine sodium 75 mcg Oral Q AM   lisinopril 5 mg Oral Q24H   ondansetron 4 mg Intravenous Once   pantoprazole 40 mg Oral QAM   polyethylene glycol 17 g Oral Daily   sodium chloride 3 mL Intravenous Q12H   tamsulosin 0.4 mg Oral Daily     Continuous Infusions:   PRN Meds:.•  acetaminophen **OR** [DISCONTINUED] acetaminophen **OR** [DISCONTINUED] acetaminophen  •  docusate sodium  •  HYDROmorphone  •  ibuprofen  •  ketorolac  •  lidocaine PF 1%  •  magnesium sulfate **OR** magnesium sulfate **OR** magnesium sulfate  •  ondansetron  •  oxyCODONE-acetaminophen  •  potassium chloride **OR** potassium chloride **OR** potassium chloride  •  potassium phosphate infusion greater than 15 mMoles **OR** potassium phosphate infusion greater than 15 mMoles **OR** potassium phosphate **OR** sodium phosphate IVPB **OR** sodium phosphate IVPB **OR** sodium phosphate IVPB  •  senna-docusate  •  simethicone  •  sodium chloride      Assessment/Plan   Assessment / Plan     Active Hospital Problems    Diagnosis  POA   • **Sepsis due to Escherichia coli (CMS/HCC) [A41.51]  Yes     Priority: High   • Left obstructive uropathy [N13.9]  Yes   • Former smoker [Z87.891]  Not Applicable   • Class 2 obesity in adult [E66.9]  Yes   • Alcohol  use [Z72.89]  Yes   • Recent UTI [N39.0]  Yes   • Chronic back pain [M54.9, G89.29]  Yes   • GERD without esophagitis [K21.9]  Yes   • Hypothyroidism on Rx  [E03.9]  Yes   • Left renal calculi X2  [N20.0]  Yes      Resolved Hospital Problems   No resolved problems to display.        Brief Hospital Course to date:   Mrs. Garcia is a 39-year-old female with a past medical history of hypothyroidism, HTN, nephrolithiasis, and GERD who was admitted on 10/16 with a one-week illness notable for intermittent left lower quadrant abdominal pain as well as dysuria and urinary urgency for which she had been placed on Macrobid and Pyridium at a [facility].  On day of admission, pt had acute, severe pain with CT imaging in the ER showing left-sided obstructive uropathy and multiple calcifications. Pt was admitted and developed sepsis and hypotension (without shock), resulting in ICU transfer on 10/17. Urology was consulted and, given improvement in pain, decided for outpatient cysto/ureteroscopy.  Patient was placed on IV meropenem in the ICU. Her Urine cx has grown E. Coli with Blood Cultures NGTD. Hospital medicine resumed care on 10/19. All problems new to me.     Sepsis due to E. coli from left pyelonephritis  Hypotension without shock, due to above  - De-escalate abx to IV rocephin. Plan for total 14 day course with oral cephalosporin at dc   - WBC has normalized   - If recurrent flank pain, pt needs inpatient cystoscope/ureteroscopy   - Initial CT personally reviewed, calculi noted   - Urine Cx reviewed. Blood Cxs NGTD    Obstructive uropathy (at left UVJ due to ureteral calculi)  Left Pelvic Calculi, ? Extra ureteral   - Follow up with Dr. Enriquez in office on 10/22 for outpatient scope    Epigastric Abdominal Pain with distension  - Reviewed CXR and KUB from overnight. Agree with no abnormalities. Look at images with pt at bedside.   - suspect constipation, related to opiates given for pain from above. Exam is benign. No severe  nausea, no vomiting. Having small BMs but not getting relief.  - Zofran x 1, Simethicone prn, try dulcolax suppository  - If no better, would consider further aggressive bowel reigmen     Normocytic anemia, stable since admit  Hypokalemia, resolved  Hypothyroidism  Hypertension, restart home lisinopril , BP reviewed   GERD  Class II Obesity     DVT Prophylaxis:  SQ lovenox  Disposition: I expect the patient to be discharged home on 10/20 or 10/21 (most likely).    CODE STATUS:   Code Status and Medical Interventions:   Ordered at: 10/16/19 1558     Level Of Support Discussed With:    Patient     Code Status:    CPR     Medical Interventions (Level of Support Prior to Arrest):    Full         Electronically signed by Pablo Morrison MD, 10/19/19, 1:19 PM.

## 2019-10-19 NOTE — NURSING NOTE
"Pt continues to c/o bloating &  SOA. Encouraged ambulation, IS, sitting up in chair. Pt refuses. Is currently on RA. 'I know it's not my lungs. I think I'm getting to much fluid.  Look at me I'm swollen.My arms & legs are swollen and I look pregnant. I don't think Im voiding much. Informed pt has had adequate UOP.\" Pt continues to be argumentative with RN refuses to try any of the above suggestions. APRN paged.  See orders.   "

## 2019-10-19 NOTE — PLAN OF CARE
"Problem: Patient Care Overview  Goal: Plan of Care Review  Outcome: Ongoing (interventions implemented as appropriate)   10/19/19 8606   Coping/Psychosocial   Plan of Care Reviewed With patient   Plan of Care Review   Progress no change   OTHER   Outcome Summary VSS. T max 99.9 Adequate I&O. c/o SOA d/t 'bloating that is pressing on diaphragm.\" PXCR, 2L, continuous pulse ox. Pt refused O2. Continued to c/o discomfort & was concerned about fluid overload. KUB, sl'd, Simethicone, miralax, colace given. Phos recheck 1.6--IV phos replaced. Rested some. Medicated for HA.        Problem: Skin Injury Risk (Adult)  Goal: Skin Health and Integrity  Outcome: Ongoing (interventions implemented as appropriate)      Problem: Sepsis/Septic Shock (Adult)  Goal: Signs and Symptoms of Listed Potential Problems Will be Absent, Minimized or Managed (Sepsis/Septic Shock)  Outcome: Ongoing (interventions implemented as appropriate)        "

## 2019-10-19 NOTE — NURSING NOTE
Pt c/o bloating that is pressing on diaphragm & constipation.   Requesting laxative. APRN paged.  See orders.

## 2019-10-20 VITALS
SYSTOLIC BLOOD PRESSURE: 150 MMHG | TEMPERATURE: 98.7 F | DIASTOLIC BLOOD PRESSURE: 86 MMHG | HEART RATE: 77 BPM | HEIGHT: 67 IN | OXYGEN SATURATION: 97 % | WEIGHT: 281.53 LBS | RESPIRATION RATE: 18 BRPM | BODY MASS INDEX: 44.19 KG/M2

## 2019-10-20 PROBLEM — A41.51 SEPSIS DUE TO ESCHERICHIA COLI (HCC): Status: RESOLVED | Noted: 2019-10-19 | Resolved: 2019-10-20

## 2019-10-20 LAB
ALBUMIN SERPL-MCNC: 3.2 G/DL (ref 3.5–5.2)
ANION GAP SERPL CALCULATED.3IONS-SCNC: 11 MMOL/L (ref 5–15)
BUN BLD-MCNC: 11 MG/DL (ref 6–20)
BUN/CREAT SERPL: 16.4 (ref 7–25)
CALCIUM SPEC-SCNC: 8.7 MG/DL (ref 8.6–10.5)
CHLORIDE SERPL-SCNC: 108 MMOL/L (ref 98–107)
CO2 SERPL-SCNC: 26 MMOL/L (ref 22–29)
CREAT BLD-MCNC: 0.67 MG/DL (ref 0.57–1)
GFR SERPL CREATININE-BSD FRML MDRD: 99 ML/MIN/1.73
GLUCOSE BLD-MCNC: 115 MG/DL (ref 65–99)
PHOSPHATE SERPL-MCNC: 3.6 MG/DL (ref 2.5–4.5)
POTASSIUM BLD-SCNC: 4 MMOL/L (ref 3.5–5.2)
SODIUM BLD-SCNC: 145 MMOL/L (ref 136–145)

## 2019-10-20 PROCEDURE — 80069 RENAL FUNCTION PANEL: CPT | Performed by: HOSPITALIST

## 2019-10-20 PROCEDURE — 99238 HOSP IP/OBS DSCHRG MGMT 30/<: CPT | Performed by: HOSPITALIST

## 2019-10-20 RX ORDER — CEFPODOXIME PROXETIL 200 MG/1
200 TABLET, FILM COATED ORAL EVERY 12 HOURS
Qty: 20 TABLET | Refills: 0 | Status: SHIPPED | OUTPATIENT
Start: 2019-10-20 | End: 2019-10-23 | Stop reason: HOSPADM

## 2019-10-20 RX ADMIN — LEVOTHYROXINE SODIUM 75 MCG: 75 CAPSULE ORAL at 06:27

## 2019-10-20 RX ADMIN — LISINOPRIL 5 MG: 5 TABLET ORAL at 09:17

## 2019-10-20 RX ADMIN — SODIUM CHLORIDE, PRESERVATIVE FREE 10 ML: 5 INJECTION INTRAVENOUS at 09:21

## 2019-10-20 RX ADMIN — PANTOPRAZOLE SODIUM 40 MG: 40 TABLET, DELAYED RELEASE ORAL at 06:26

## 2019-10-20 NOTE — DISCHARGE SUMMARY
[facility] Medicine Services  DISCHARGE SUMMARY    Patient Name: Brandy Garcia  : 1980  MRN: [id number]    Date of Admission: 10/16/2019  Date of Discharge:  10/20/19    Primary Care Physician: Emily Solano APRN  Consulting Urologist: Dr. Ashvin Enriquez     Consults     Date and Time Order Name Status Description    10/17/2019 0634 Inpatient Urology Consult Completed           Hospital Course     Presenting Problem:   Intractable pain [R52]  Sepsis (CMS/HCC) [A41.9]    Active Hospital Problems    Diagnosis  POA   • Left obstructive uropathy [N13.9]  Yes   • Former smoker [Z87.891]  Not Applicable   • Class 2 obesity in adult [E66.9]  Yes   • Alcohol use [Z72.89]  Yes   • Recent UTI [N39.0]  Yes   • Chronic back pain [M54.9, G89.29]  Yes   • GERD without esophagitis [K21.9]  Yes   • Hypothyroidism on Rx  [E03.9]  Yes   • Left renal calculi X2  [N20.0]  Yes      Resolved Hospital Problems    Diagnosis Date Resolved POA   • **Sepsis due to Escherichia coli (CMS/HCC) [A41.51] 10/20/2019 Yes     Priority: High      Hospital Course:   Mrs. Garcia is a 39-year-old female with a past medical history of hypothyroidism, HTN, nephrolithiasis, and GERD who was admitted on 10/16 with a one-week illness notable for intermittent left lower quadrant abdominal pain as well as dysuria and urinary urgency for which she had been placed on Macrobid and Pyridium at a [facility]. On day of admission, pt had acute, severe pain with CT imaging in the ER showing left-sided obstructive uropathy and multiple calcifications. Pt was admitted and developed sepsis and hypotension (without shock), resulting in ICU transfer on 10/17. Urology was consulted and, given improvement in pain, decided for outpatient cysto/ureteroscopy.  Patient was placed on IV meropenem in the ICU. Her Urine cx has grown E. Coli with Blood Cultures NGTD. Hospital medicine resumed care on 10/19. She had symptomatic constipation on 10/19  (related to opiates received earlier in admission) that resolved with magnesium citrate. On 10/20, pt reported that she was back to her baseline of health and requested discharge home. She had no residual pain or discomfort. She will complete a 14 day course of antibiotics (covering for pyelonephritis) with 10 additional days of cefpodoxime at discharge (she has tolerated ceftriaxone here). Pt will follow up with her PCP and Dr. Enriquez this week.    Further details of hospitalization are provided below in problem list format:     Sepsis due to E. coli from left pyelonephritis  Hypotension without shock, due to above  - s/p 4 days of IV abx, Dc with 10 days of cefpodoxime based on culture results. Blood Cxs NGTD    Obstructive uropathy (at left UVJ due to ureteral calculi)  Left Pelvic Calculi, ? Extra ureteral   - Follow up with Dr. Enriquez in office on 10/22 for outpatient scope. Pt to call and get appointment time tomorrow      Opiate-induced Constipation, resolved with mag citrate    Normocytic anemia, stable since admit  Hypokalemia, resolved  Hypothyroidism  Hypertension, resume lisinopril at dc   GERD  Class II Obesity     Discharge Follow Up Recommendations for labs/diagnostics:  1. Follow up with PCP this week.  2. Follow up with Dr. Enriquez on 10/22 as scheduled.    Day of Discharge     HPI:   F/u abdominal pain    Pt's abdominal distension and dyspnea has completely resolved after several BMs from Mag Citrate. She is afebrile, without significant symptoms and feels well enough to go home. Pt eating breakfast during my visit.     Review of Systems  Gen: no fevers, chills  GI: no n/v/d, abdominal pain    Vital Signs:   Temp:  [98.7 °F (37.1 °C)-99 °F (37.2 °C)] 98.7 °F (37.1 °C)  Heart Rate:  [56-77] 77  Resp:  [18] 18  BP: (111-164)/(58-93) 150/86     Physical Exam:  Constitutional: No acute distress, awake, alert  HENT: NCAT, mucous membranes moist  Respiratory: Clear to auscultation bilaterally, respiratory effort  normal   Cardiovascular: RRR, no murmurs, rubs, or gallops, palpable pedal pulses bilaterally  Gastrointestinal: Positive bowel sounds, soft, nontender, nondistended, no CVA tenderness  Musculoskeletal: No bilateral ankle edema  Psychiatric: Appropriate affect, cooperative      Pertinent  and/or Most Recent Results     Results from last 7 days   Lab Units 10/20/19  0432 10/19/19  0700 10/18/19  2301 10/18/19  0730 10/18/19  0430 10/17/19  0537 10/17/19  0319 10/16/19  2028 10/16/19  1136   WBC 10*3/mm3  --  6.89  --   --  10.51 19.14* 19.50*  --  6.90   HEMOGLOBIN g/dL  --  9.7*  --   --  10.3* 10.2* 10.4*  --  12.5   HEMATOCRIT %  --  30.4*  --   --  33.3* 31.9* 32.8*  --  38.9   PLATELETS 10*3/mm3  --  245  --   --  213 265 276  --  415   SODIUM mmol/L 145 140  --  141  --   --   --  139 141   POTASSIUM mmol/L 4.0 4.3 4.4 3.4*  --   --   --  4.0 3.9   CHLORIDE mmol/L 108* 108*  --  109*  --   --   --  106 105   CO2 mmol/L 26.0 24.0  --  25.0  --   --   --  21.0* 26.0   BUN mg/dL 11 9  --  8  --   --   --  13 12   CREATININE mg/dL 0.67 0.66  --  0.74  --   --   --  1.09* 0.82   GLUCOSE mg/dL 115* 94  --  96  --   --   --  93 102*   CALCIUM mg/dL 8.7 8.4*  --  8.3*  --   --   --  8.8 9.5     Results from last 7 days   Lab Units 10/19/19  0700 10/18/19  0730 10/16/19  1136   BILIRUBIN mg/dL 0.2 0.4 0.2   ALK PHOS U/L 58 54 64   ALT (SGPT) U/L 29 40* 31   AST (SGOT) U/L 16 19 19           Invalid input(s): TG, LDLCALC, LDLREALC  Results from last 7 days   Lab Units 10/18/19  0730 10/17/19  0447 10/17/19  0319   TSH uIU/mL  --  3.680  --    CORTISOL mcg/dL  --  29.24  --    PROCALCITONIN ng/mL 3.89* 0.88*  --    LACTATE mmol/L  --   --  2.0       Brief Urine Lab Results  (Last result in the past 365 days)      Color   Clarity   Blood   Leuk Est   Nitrite   Protein   CREAT   Urine HCG        10/16/19 1135               Negative           Microbiology Results Abnormal     Procedure Component Value - Date/Time    Blood  Culture - Blood, Hand, Left [[id number]] Collected:  10/16/19 2028    Lab Status:  Preliminary result Specimen:  Blood from Hand, Left Updated:  10/19/19 2115     Blood Culture No growth at 3 days    Narrative:       Aerobic only    Blood Culture - Blood, Arm, Left [[id number]] Collected:  10/16/19 2028    Lab Status:  Preliminary result Specimen:  Blood from Arm, Left Updated:  10/19/19 2115     Blood Culture No growth at 3 days    Urine Culture - Urine, Urine, Clean Catch [[id number]]  (Abnormal)  (Susceptibility) Collected:  10/16/19 1128    Lab Status:  Final result Specimen:  Urine, Clean Catch Updated:  10/19/19 0333     Urine Culture >100,000 CFU/mL Escherichia coli    Susceptibility      Escherichia coli     KAITLYNN     Ampicillin Resistant     Ampicillin + Sulbactam Intermediate     Cefazolin Susceptible     Cefepime Susceptible     Ceftazidime Susceptible     Ceftriaxone Susceptible     Gentamicin Susceptible     Levofloxacin Resistant     Nitrofurantoin Susceptible     Piperacillin + Tazobactam Susceptible     Tetracycline Susceptible     Trimethoprim + Sulfamethoxazole Susceptible                          Imaging Results (all)     Procedure Component Value Units Date/Time    XR Abdomen KUB [[id number]] Collected:  10/17/19 1941     Updated:  10/19/19 0910    Narrative:          EXAMINATION: XR ABDOMEN KUB - 10/17/2019     INDICATION:  R52-Pain, unspecified; N23-Unspecified renal colic.      COMPARISON: Abdominal CT scan 10/16/2019.     FINDINGS: Previous exam report showed 10 mm and 2.5 mm calcifications in  the pelvis near the left ureter, but also present on earlier scan from  2018, of uncertain significance. Small left renal calculus is also  noted.     Today's exam again shows the 10 mm calculus and a much smaller adjacent  calculus superimposed over the left lateral aspect of the bladder in or  near the expected location of the left ureterovesical junction. The  patient's small left renal calculus is not  visible. No other  calcification is  appreciated. Bowel gas pattern is nonobstructive.  Clips are noted in the gallbladder fossa.        Impression:       Persistent calcifications near the expected location of the  left ureterovesical junction, as on previous CT scan from 10/16/2019.  Nonobstructive bowel gas pattern.     DICTATED:   10/17/2019  EDITED/ls :   10/17/2019      This report was finalized on 10/19/2019 9:06 AM by DR. Onesimo Cantu MD.       XR Abdomen KUB [[id number]] Collected:  10/19/19 0201     Updated:  10/19/19 0203    Narrative:       CR Abdomen 1 Vw    INDICATION:   Abdominal pain and distention for the past 2 days    COMPARISON:   10/17/2019    FINDINGS:  AP radiographs of the abdomen. Left-sided pelvic calcifications are again noted and unchanged. One measures 10 mm and the other measures 2 mm. The calcifications are unchanged.    The bowel gas pattern is nonobstructive. No acute osseous abnormalities.      Impression:       Left-sided pelvic calcifications are unchanged from the previous examination. Normal bowel gas pattern.    Signer Name: Yoel Marques MD   Signed: 10/19/2019 2:01 AM   Workstation Name: [id number]    [facility]    XR Chest 1 View [[id number]] Collected:  10/19/19 0037     Updated:  10/19/19 0039    Narrative:       CR Chest 1 Vw    INDICATION:   Shortness of breath and chest pain beginning prior to arrival     COMPARISON:    10/17/2019    FINDINGS:  Single portable AP view(s) of the chest.        There is mild cardiomegaly that is unchanged. The lungs are clear. Vascular markings are normal. No effusions are seen.       Impression:       Mild cardiomegaly. No active disease.    Signer Name: Yoel Marques MD   Signed: 10/19/2019 12:37 AM   [facility]station [facility]    [facility]    XR Chest 1 View [[id number]] Collected:  10/17/19 0132     Updated:  10/17/19 0134    Narrative:       CR Chest 1 Vw    INDICATION:   Acute onset  of fever and hypotension     COMPARISON:    September 9, 2018    FINDINGS:  Single portable AP view(s) of the chest.    The heart and mediastinal contours are normal. The lungs are clear. No pneumothorax or pleural effusion.       Impression:       No acute cardiopulmonary findings.    Signer Name: Severo Licona MD   Signed: 10/17/2019 1:32 AM   Workstation Name: [id number]    [facility]    US Non-ob Transvaginal [[id number]] Collected:  10/16/19 1603     Updated:  10/16/19 2238    Narrative:       EXAMINATION: US NON-OB TRANSVAGINAL, US TESTICULAR OR OVARIAN VASCULAR  LIMITED - 10/16/2019     INDICATION: Left adnexal pain x 1 week.     TECHNIQUE: Duplex interrogation is be performed to the ovaries to  visualize both arterial and venous waveforms.     COMPARISON: 11/20/2018 transvaginal ultrasound.     FINDINGS: Multiple small nabothian cysts are seen in the cervix. The  uterus measures approximately 9.7 x 5.2 x 5.8 cm and is otherwise  homogeneous in echotexture. No uterine mass is identified. The  endometrial echo complex is generally uniform and normal in appearance  at approximately 8 mm in width.     The right ovary measures approximately 4.3 x 2.1 x 2.5 cm and contains  multiple small follicles. There is normal arterial waveform and color  Doppler flow.     Left ovary measures approximately 3.4 x 1.9 x 2.3 cm and contains  multiple small follicles. There is normal arterial waveform and color  Doppler flow. No adnexal mass or significant intrapelvic free fluid is  seen.       Impression:       Incidentally noted nabothian cysts. Otherwise negative  pelvic ultrasound. Uterus and ovaries appear within normal limits. In  particular, no visible left ovarian cyst or evidence of torsion.      DICTATED:   10/16/2019  EDITED/ls :   10/16/2019      This report was finalized on 10/16/2019 10:35 PM by DR. Onesimo Cantu MD.        Testicular or Ovarian Vascular Limited [[id number]] Collected:   10/16/19 1603     Updated:  10/16/19 2238    Narrative:       EXAMINATION: US NON-OB TRANSVAGINAL, US TESTICULAR OR OVARIAN VASCULAR  LIMITED - 10/16/2019     INDICATION: Left adnexal pain x 1 week.     TECHNIQUE: Duplex interrogation is be performed to the ovaries to  visualize both arterial and venous waveforms.     COMPARISON: 11/20/2018 transvaginal ultrasound.     FINDINGS: Multiple small nabothian cysts are seen in the cervix. The  uterus measures approximately 9.7 x 5.2 x 5.8 cm and is otherwise  homogeneous in echotexture. No uterine mass is identified. The  endometrial echo complex is generally uniform and normal in appearance  at approximately 8 mm in width.     The right ovary measures approximately 4.3 x 2.1 x 2.5 cm and contains  multiple small follicles. There is normal arterial waveform and color  Doppler flow.     Left ovary measures approximately 3.4 x 1.9 x 2.3 cm and contains  multiple small follicles. There is normal arterial waveform and color  Doppler flow. No adnexal mass or significant intrapelvic free fluid is  seen.       Impression:       Incidentally noted nabothian cysts. Otherwise negative  pelvic ultrasound. Uterus and ovaries appear within normal limits. In  particular, no visible left ovarian cyst or evidence of torsion.      DICTATED:   10/16/2019  EDITED/ls :   10/16/2019      This report was finalized on 10/16/2019 10:35 PM by DR. Onesimo Cantu MD.       CT Abdomen Pelvis Without Contrast [[id number]] Collected:  10/16/19 1319     Updated:  10/16/19 2159    Narrative:       EXAMINATION: CT ABDOMEN AND PELVIS WO CONTRAST-10/16/2019:      INDICATION: Left flank pain.     TECHNIQUE: 5 mm unenhanced images through the abdomen and pelvis.     The radiation dose reduction device was turned on for each scan per the  ALARA (As Low as Reasonably Achievable) protocol.     COMPARISON: 08/08/2018.     FINDINGS: The included lower lungs appear grossly clear. No significant  abnormalities are noted  of the liver, spleen, pancreas, adrenal glands,  or right kidney. The gallbladder is surgically absent. There is slight  indistinctness of the left renal outline and mild dilatation of the  collecting system. There is a 3 mm nonobstructing lower pole calculus.     There is dilatation of the left renal collecting system and ureter down  to the level of the distal left ureter. There are 2 calcifications at  this level, an approximately 2.5 mm calculus that appears to be external  to the ureter, and a 10 mm calculus, very closely associated with the  left ureter, however, these are both present on the 2018 exam  where there is no obstructive uropathy, and where they appear adjacent  to but distinct from the distal left ureter. Accordingly, their precise  location is uncertain.     The bladder is decompressed. The uterus and ovaries are not enlarged. No  mass or adenopathy is seen. Large and small bowel loops appear grossly  normal.       Impression:       1.  Mild left-sided obstructive uropathy, and 3 mm nonobstructing left  renal calculus.  2.  Two calcifications in the left pelvis, 10 mm and 2.5 mm in diameter,  both of which were present on the 2018 exam and appeared external  to the ureter. The larger calcification appears very closely associated  with the left ureter today, but it is questionable whether this is  actually intraureteral. Consider evaluation by urology.  3.  No other evidence of acute intra-abdominal or intrapelvic disease is  seen.     D:  10/16/2019  E:  10/16/2019     This report was finalized on 10/16/2019 9:56 PM by DR. Onesimo Cantu MD.                       Results for orders placed during the hospital encounter of 07/21/15   CONVERTED (HISTORICAL) ECHO    Narrative Patient:      TIM MARLOW    [facility] Rec#:     [id number]               :          1980            Date:         2015            Age:          35y                   Height:       170.18 cm / 67.0 in  Weight:        117.94 kg / 259.9 lbs  Sex:          F                     BSA:          2.26  Room#:        ER 14                     Sonographer:  Sadia Green BS,RDCS,RVS  Referring:    FRANCISCOVILMA  Reading:      Branden Jacobsen MD  Primary:      NO PRIMARY CARE PHYSICIAN  Unit:         Emergency Dept.  ______________________________________________________________________    Transthoracic Echocardiogram    Indication:  dyspnea  BP:           127/92  HR:           105  Rhythm:       Tachycardyia    Conclusions  1. The estimated ejection fraction is greater than 65%.   2. Normal left ventricular diastolic filling is observed.  3. The inferior vena cava appears normal in size.IVC is collapsed at  rest-consistent with decreased intravascular volume.    Findings       Technical Comments:  The study quality is good.      Left Ventricle:  The left ventricular chamber size is normal. The estimated ejection  fraction is greater than 65%.  Normal left ventricular diastolic filling  is observed.     Left Atrium:  The left atrial chamber size is normal.     Right Ventricle:  The right ventricular cavity size is normal. The right ventricular  global systolic function is normal.     Right Atrium:  The right atrial cavity size is normal. No atrial septal defect is  visualized.     Aortic Valve:  The aortic valve is trileaflet. There is no evidence of aortic  regurgitation. There is no evidence of aortic stenosis.     Mitral Valve:  The mitral valve leaflets appear normal. There is no evidence of mitral  valve prolapse. There is a trace of mitral regurgitation. There is no  evidence of mitral stenosis.     Tricuspid Valve:  The tricuspid valve leaflets are normal.  There is a physiological  tricuspid regurgitation.  Unable to estimate the right ventricular  systolic pressure.     Pulmonic Valve:  The pulmonic valve appears normal. There is a trace pulmonic  regurgitation.      Pericardium:  There is no evidence of pericardial effusion.      Aorta:  There is no dilatation of the aortic root.     Pulmonary Artery:  The main pulmonary artery appears normal.     Venous:  The venous system appears normal. The inferior vena cava appears normal  in size.IVC is collapsed at rest-consistent with decreased intravascular  volume. There is a greater than 50% respiratory change in the inferior  vena cava dimension. The flow pattern of the inferior vena cava appears  normal.     Measurements   Chambers  Name                    Value           Ao root diameter (MM)   2.6 cm          LA dimension (AP) MM    3.3 cm          LA:Ao ratio (MM)        1.27 ratio      RVIDd (AP) 2D           1.78 cm         IVSd (2D)               1.12 cm         LVIDd (2D)              3.37 cm         LVIDs (2D)              2.68 cm         LVPWd (2D)              0.97 cm         Ao root diameter (2D)   2.6 cm          LA dimension (AP) 2D    2.9 cm          LA:Ao ratio (2D)        1.12 ratio        Volumes  Name                    Value           LA ESV SP 4CH (MOD)     18 ml           LA ESV SP 2CH (MOD)     16 ml           LA ESV BP (MOD)         17 ml           LA ESV BP (MOD) index   7.5 ml/m2         Diastolic/Systolic Function  Name                    Value           MV E-wave Vmax          0.88 m/sec      MV A-wave Vmax          0.72 m/sec      MV E:A ratio            1.2 ratio       LV septal e' Vmax       0.07 m/sec      LV lateral e' Vmax      0.12 m/sec      LV E:e' septal ratio    12.8 ratio      LV E:e' lateral ratio   7.4 ratio         Aortic Valve  Name                    Value           AV Vmax                 1.81 m/sec      AV VTI                  32.2 cm         AV peak gradient        13 mmHg         AV mean gradient        7 mmHg          LVOT Vmax               1.13 m/sec      LVOT VTI                20.5 cm         LVOT peak gradient      5 mmHg          LVOT mean gradient      3 mmHg            Pulmonic Valve/Qp:Qs  Name                    Value           PV  Vmax                 1.3 m/sec       PV peak gradient        7 mmHg          PV acceleration time    123 msec          Electronically signed by: Branden Jacobsen MD on 07/21/2015 17:43:39        Order Current Status    Blood Culture - Blood, Arm, Left Preliminary result    Blood Culture - Blood, Hand, Left Preliminary result        Discharge Details        Discharge Medications      New Medications      Instructions Start Date   cefpodoxime 200 MG tablet  Commonly known as:  VANTIN   200 mg, Oral, Every 12 Hours         Continue These Medications      Instructions Start Date   lisinopril 5 MG tablet  Commonly known as:  PRINIVIL,ZESTRIL   5 mg, Oral, Daily      omeprazole 20 MG capsule  Commonly known as:  priLOSEC   20 mg, Oral, Daily PRN      TIROSINT 75 MCG capsule  Generic drug:  levothyroxine sodium   75 mcg, Oral, Daily             Allergies   Allergen Reactions   • Prednisone Other (See Comments)     Makes her crazy    • Brethaire [Terbutaline]    • Zithromax [Azithromycin] GI Intolerance   • Amoxicillin Rash     Received ceftriaxone 10/2019         Discharge Disposition:  Home or Self Care    Diet:  Hospital:  Diet Order   Procedures   • Diet Regular; GI Soft / Burnett     Discharge:   Diet Instructions     Diet: Regular; Thin      Discharge Diet:  Regular    Fluid Consistency:  Thin          Discharge Activity:   Activity Instructions     Activity as Tolerated              CODE STATUS:    Code Status and Medical Interventions:   Ordered at: 10/16/19 5840     Level Of Support Discussed With:    Patient     Code Status:    CPR     Medical Interventions (Level of Support Prior to Arrest):    Full         No future appointments.    Additional Instructions for the Follow-ups that You Need to Schedule     Discharge Follow-up with PCP   As directed       Currently Documented PCP:    Emily Solano APRN    PCP Phone Number:    [phone]     Follow Up Details:  within the next week as scheduled         Discharge  Follow-up with Specified Provider: Dr. Ashvin Enriquez, Urology, on 10/22 (call office for appt time)   As directed      To:  Dr. Ashvin Enriquez, Urology, on 10/22 (call office for appt time)               Time Spent on Discharge:  25 minutes    Electronically signed by Pablo Morrison MD, 10/20/19, 9:04 AM.

## 2019-10-20 NOTE — PLAN OF CARE
Problem: Patient Care Overview  Goal: Plan of Care Review  Outcome: Ongoing (interventions implemented as appropriate)   10/19/19 1822 10/19/19 2000 10/20/19 0404   Coping/Psychosocial   Plan of Care Reviewed With --  patient --    Plan of Care Review   Progress no change --  --    OTHER   Outcome Summary --  --  vss, abd pressure relieved with mag cit, watery stool, pt slept off and on this shift will continue to monitor     Goal: Individualization and Mutuality  Outcome: Ongoing (interventions implemented as appropriate)    Goal: Discharge Needs Assessment  Outcome: Ongoing (interventions implemented as appropriate)    Goal: Interprofessional Rounds/Family Conf  Outcome: Ongoing (interventions implemented as appropriate)      Problem: Skin Injury Risk (Adult)  Goal: Skin Health and Integrity  Outcome: Ongoing (interventions implemented as appropriate)      Problem: Sepsis/Septic Shock (Adult)  Goal: Signs and Symptoms of Listed Potential Problems Will be Absent, Minimized or Managed (Sepsis/Septic Shock)  Outcome: Ongoing (interventions implemented as appropriate)

## 2019-10-21 ENCOUNTER — APPOINTMENT (OUTPATIENT)
Dept: GENERAL RADIOLOGY | Facility: HOSPITAL | Age: 39
End: 2019-10-21

## 2019-10-21 ENCOUNTER — APPOINTMENT (OUTPATIENT)
Dept: CT IMAGING | Facility: HOSPITAL | Age: 39
End: 2019-10-21

## 2019-10-21 ENCOUNTER — APPOINTMENT (OUTPATIENT)
Dept: CARDIOLOGY | Facility: HOSPITAL | Age: 39
End: 2019-10-21

## 2019-10-21 ENCOUNTER — HOSPITAL ENCOUNTER (OUTPATIENT)
Facility: HOSPITAL | Age: 39
Setting detail: OBSERVATION
Discharge: HOME | End: 2019-10-23
Attending: EMERGENCY MEDICINE | Admitting: INTERNAL MEDICINE

## 2019-10-21 ENCOUNTER — READMISSION MANAGEMENT (OUTPATIENT)
Dept: CALL CENTER | Facility: HOSPITAL | Age: 39
End: 2019-10-21

## 2019-10-21 DIAGNOSIS — R29.818 SUSPECTED SLEEP APNEA: Primary | ICD-10-CM

## 2019-10-21 DIAGNOSIS — R10.10 UPPER ABDOMINAL PAIN: ICD-10-CM

## 2019-10-21 DIAGNOSIS — R06.00 DYSPNEA, UNSPECIFIED TYPE: ICD-10-CM

## 2019-10-21 DIAGNOSIS — R00.1 BRADYCARDIA, SINUS: Primary | ICD-10-CM

## 2019-10-21 LAB
ALBUMIN SERPL-MCNC: 3.8 G/DL (ref 3.5–5.2)
ALBUMIN/GLOB SERPL: 1 G/DL
ALP SERPL-CCNC: 70 U/L (ref 39–117)
ALT SERPL W P-5'-P-CCNC: 40 U/L (ref 1–33)
ANION GAP SERPL CALCULATED.3IONS-SCNC: 11 MMOL/L (ref 5–15)
ANION GAP SERPL CALCULATED.3IONS-SCNC: 9 MMOL/L (ref 5–15)
AST SERPL-CCNC: 30 U/L (ref 1–32)
BACTERIA SPEC AEROBE CULT: NORMAL
BACTERIA SPEC AEROBE CULT: NORMAL
BACTERIA UR QL AUTO: ABNORMAL /HPF
BASOPHILS # BLD AUTO: 0.05 10*3/MM3 (ref 0–0.2)
BASOPHILS NFR BLD AUTO: 0.7 % (ref 0–1.5)
BH CV ECHO MEAS - AO MAX PG (FULL): 1.3 MMHG
BH CV ECHO MEAS - AO MAX PG: 8.1 MMHG
BH CV ECHO MEAS - AO MEAN PG (FULL): 0.91 MMHG
BH CV ECHO MEAS - AO MEAN PG: 4.2 MMHG
BH CV ECHO MEAS - AO ROOT AREA (BSA CORRECTED): 1.3
BH CV ECHO MEAS - AO ROOT AREA: 6.6 CM^2
BH CV ECHO MEAS - AO ROOT DIAM: 2.9 CM
BH CV ECHO MEAS - AO V2 MAX: 142.3 CM/SEC
BH CV ECHO MEAS - AO V2 MEAN: 90.7 CM/SEC
BH CV ECHO MEAS - AO V2 VTI: 36.7 CM
BH CV ECHO MEAS - ASC AORTA: 3.4 CM
BH CV ECHO MEAS - AVA(I,A): 3 CM^2
BH CV ECHO MEAS - AVA(I,D): 3 CM^2
BH CV ECHO MEAS - AVA(V,A): 2.8 CM^2
BH CV ECHO MEAS - AVA(V,D): 2.8 CM^2
BH CV ECHO MEAS - BSA(HAYCOCK): 2.4 M^2
BH CV ECHO MEAS - BSA: 2.3 M^2
BH CV ECHO MEAS - BZI_BMI: 40.7 KILOGRAMS/M^2
BH CV ECHO MEAS - BZI_METRIC_HEIGHT: 170.2 CM
BH CV ECHO MEAS - BZI_METRIC_WEIGHT: 117.9 KG
BH CV ECHO MEAS - EDV(CUBED): 73.9 ML
BH CV ECHO MEAS - EDV(MOD-SP2): 68 ML
BH CV ECHO MEAS - EDV(MOD-SP4): 157 ML
BH CV ECHO MEAS - EDV(TEICH): 78.4 ML
BH CV ECHO MEAS - EF(CUBED): 63 %
BH CV ECHO MEAS - EF(MOD-BP): 67 %
BH CV ECHO MEAS - EF(MOD-SP2): 55.9 %
BH CV ECHO MEAS - EF(MOD-SP4): 72 %
BH CV ECHO MEAS - EF(TEICH): 54.9 %
BH CV ECHO MEAS - ESV(CUBED): 27.3 ML
BH CV ECHO MEAS - ESV(MOD-SP2): 30 ML
BH CV ECHO MEAS - ESV(MOD-SP4): 44 ML
BH CV ECHO MEAS - ESV(TEICH): 35.4 ML
BH CV ECHO MEAS - FS: 28.2 %
BH CV ECHO MEAS - IVS/LVPW: 0.85
BH CV ECHO MEAS - IVSD: 0.8 CM
BH CV ECHO MEAS - LA DIMENSION: 4 CM
BH CV ECHO MEAS - LA/AO: 1.4
BH CV ECHO MEAS - LAD MAJOR: 5.4 CM
BH CV ECHO MEAS - LAT PEAK E' VEL: 10.9 CM/SEC
BH CV ECHO MEAS - LATERAL E/E' RATIO: 11.5
BH CV ECHO MEAS - LV DIASTOLIC VOL/BSA (35-75): 69.5 ML/M^2
BH CV ECHO MEAS - LV MASS(C)D: 113 GRAMS
BH CV ECHO MEAS - LV MASS(C)DI: 50 GRAMS/M^2
BH CV ECHO MEAS - LV MAX PG: 6.8 MMHG
BH CV ECHO MEAS - LV MEAN PG: 3.3 MMHG
BH CV ECHO MEAS - LV SYSTOLIC VOL/BSA (12-30): 19.5 ML/M^2
BH CV ECHO MEAS - LV V1 MAX: 130.3 CM/SEC
BH CV ECHO MEAS - LV V1 MEAN: 83.7 CM/SEC
BH CV ECHO MEAS - LV V1 VTI: 35.9 CM
BH CV ECHO MEAS - LVIDD: 4.2 CM
BH CV ECHO MEAS - LVIDS: 3 CM
BH CV ECHO MEAS - LVLD AP2: 8.3 CM
BH CV ECHO MEAS - LVLD AP4: 9.2 CM
BH CV ECHO MEAS - LVLS AP2: 7.4 CM
BH CV ECHO MEAS - LVLS AP4: 7.6 CM
BH CV ECHO MEAS - LVOT AREA (M): 3.1 CM^2
BH CV ECHO MEAS - LVOT AREA: 3.1 CM^2
BH CV ECHO MEAS - LVOT DIAM: 2 CM
BH CV ECHO MEAS - LVPWD: 0.94 CM
BH CV ECHO MEAS - MED PEAK E' VEL: 9.8 CM/SEC
BH CV ECHO MEAS - MEDIAL E/E' RATIO: 12.9
BH CV ECHO MEAS - MV A MAX VEL: 64.7 CM/SEC
BH CV ECHO MEAS - MV DEC TIME: 0.15 SEC
BH CV ECHO MEAS - MV E MAX VEL: 127.3 CM/SEC
BH CV ECHO MEAS - MV E/A: 2
BH CV ECHO MEAS - PA ACC SLOPE: 632.3 CM/SEC^2
BH CV ECHO MEAS - PA ACC TIME: 0.15 SEC
BH CV ECHO MEAS - PA MAX PG: 4.7 MMHG
BH CV ECHO MEAS - PA PR(ACCEL): 12.5 MMHG
BH CV ECHO MEAS - PA V2 MAX: 108 CM/SEC
BH CV ECHO MEAS - RAP SYSTOLE: 3 MMHG
BH CV ECHO MEAS - RVDD: 1.8 CM
BH CV ECHO MEAS - RVSP: 27 MMHG
BH CV ECHO MEAS - SI(AO): 106.9 ML/M^2
BH CV ECHO MEAS - SI(CUBED): 20.6 ML/M^2
BH CV ECHO MEAS - SI(LVOT): 49.1 ML/M^2
BH CV ECHO MEAS - SI(MOD-SP2): 16.8 ML/M^2
BH CV ECHO MEAS - SI(MOD-SP4): 50 ML/M^2
BH CV ECHO MEAS - SI(TEICH): 19 ML/M^2
BH CV ECHO MEAS - SV(AO): 241.7 ML
BH CV ECHO MEAS - SV(CUBED): 46.5 ML
BH CV ECHO MEAS - SV(LVOT): 110.9 ML
BH CV ECHO MEAS - SV(MOD-SP2): 38 ML
BH CV ECHO MEAS - SV(MOD-SP4): 113 ML
BH CV ECHO MEAS - SV(TEICH): 43 ML
BH CV ECHO MEAS - TAPSE (>1.6): 2.3 CM2
BH CV ECHO MEAS - TR MAX PG: 24 MMHG
BH CV ECHO MEAS - TR MAX VEL: 244.5 CM/SEC
BH CV ECHO MEAS - TV MAX PG: 1.7 MMHG
BH CV ECHO MEAS - TV V2 MAX: 65.2 CM/SEC
BH CV ECHO MEASUREMENTS AVERAGE E/E' RATIO: 12.3
BH CV XLRA - RV BASE: 3.4 CM
BH CV XLRA - RV LENGTH: 7 CM
BH CV XLRA - RV MID: 3.2 CM
BH CV XLRA - TDI S': 13.3 CM/SEC
BILIRUB SERPL-MCNC: 0.2 MG/DL (ref 0.2–1.2)
BILIRUB UR QL STRIP: NEGATIVE
BUN BLD-MCNC: 7 MG/DL (ref 6–20)
BUN BLD-MCNC: 9 MG/DL (ref 6–20)
BUN/CREAT SERPL: 10.9 (ref 7–25)
BUN/CREAT SERPL: 15.5 (ref 7–25)
CALCIUM SPEC-SCNC: 9.1 MG/DL (ref 8.6–10.5)
CALCIUM SPEC-SCNC: 9.4 MG/DL (ref 8.6–10.5)
CHLORIDE SERPL-SCNC: 107 MMOL/L (ref 98–107)
CHLORIDE SERPL-SCNC: 107 MMOL/L (ref 98–107)
CLARITY UR: CLEAR
CO2 SERPL-SCNC: 24 MMOL/L (ref 22–29)
CO2 SERPL-SCNC: 26 MMOL/L (ref 22–29)
COLOR UR: YELLOW
CREAT BLD-MCNC: 0.58 MG/DL (ref 0.57–1)
CREAT BLD-MCNC: 0.64 MG/DL (ref 0.57–1)
D-LACTATE SERPL-SCNC: 0.7 MMOL/L (ref 0.5–2)
D-LACTATE SERPL-SCNC: 0.9 MMOL/L (ref 0.5–2)
DEPRECATED RDW RBC AUTO: 39.6 FL (ref 37–54)
EOSINOPHIL # BLD AUTO: 0.23 10*3/MM3 (ref 0–0.4)
EOSINOPHIL NFR BLD AUTO: 3.3 % (ref 0.3–6.2)
ERYTHROCYTE [DISTWIDTH] IN BLOOD BY AUTOMATED COUNT: 12.7 % (ref 12.3–15.4)
FERRITIN SERPL-MCNC: 96.94 NG/ML (ref 13–150)
GFR SERPL CREATININE-BSD FRML MDRD: 104 ML/MIN/1.73
GFR SERPL CREATININE-BSD FRML MDRD: 116 ML/MIN/1.73
GLOBULIN UR ELPH-MCNC: 3.8 GM/DL
GLUCOSE BLD-MCNC: 106 MG/DL (ref 65–99)
GLUCOSE BLD-MCNC: 109 MG/DL (ref 65–99)
GLUCOSE UR STRIP-MCNC: NEGATIVE MG/DL
HCT VFR BLD AUTO: 33.9 % (ref 34–46.6)
HGB BLD-MCNC: 11 G/DL (ref 12–15.9)
HGB UR QL STRIP.AUTO: NEGATIVE
HYALINE CASTS UR QL AUTO: ABNORMAL /LPF
IMM GRANULOCYTES # BLD AUTO: 0.08 10*3/MM3 (ref 0–0.05)
IMM GRANULOCYTES NFR BLD AUTO: 1.1 % (ref 0–0.5)
KETONES UR QL STRIP: NEGATIVE
LEUKOCYTE ESTERASE UR QL STRIP.AUTO: ABNORMAL
LIPASE SERPL-CCNC: 71 U/L (ref 13–60)
LV EF 2D ECHO EST: 65 %
LYMPHOCYTES # BLD AUTO: 2.6 10*3/MM3 (ref 0.7–3.1)
LYMPHOCYTES NFR BLD AUTO: 37.2 % (ref 19.6–45.3)
MAGNESIUM SERPL-MCNC: 2 MG/DL (ref 1.6–2.6)
MCH RBC QN AUTO: 27.7 PG (ref 26.6–33)
MCHC RBC AUTO-ENTMCNC: 32.4 G/DL (ref 31.5–35.7)
MCV RBC AUTO: 85.4 FL (ref 79–97)
MONOCYTES # BLD AUTO: 0.72 10*3/MM3 (ref 0.1–0.9)
MONOCYTES NFR BLD AUTO: 10.3 % (ref 5–12)
NEUTROPHILS # BLD AUTO: 3.31 10*3/MM3 (ref 1.7–7)
NEUTROPHILS NFR BLD AUTO: 47.4 % (ref 42.7–76)
NITRITE UR QL STRIP: NEGATIVE
NRBC BLD AUTO-RTO: 0 /100 WBC (ref 0–0.2)
PH UR STRIP.AUTO: 7 [PH] (ref 5–8)
PHOSPHATE SERPL-MCNC: 3.8 MG/DL (ref 2.5–4.5)
PLAT MORPH BLD: NORMAL
PLATELET # BLD AUTO: 351 10*3/MM3 (ref 140–450)
PMV BLD AUTO: 9.5 FL (ref 6–12)
POTASSIUM BLD-SCNC: 3.8 MMOL/L (ref 3.5–5.2)
POTASSIUM BLD-SCNC: 3.8 MMOL/L (ref 3.5–5.2)
PROCALCITONIN SERPL-MCNC: 0.73 NG/ML (ref 0.1–0.25)
PROT SERPL-MCNC: 7.6 G/DL (ref 6–8.5)
PROT UR QL STRIP: NEGATIVE
RBC # BLD AUTO: 3.97 10*6/MM3 (ref 3.77–5.28)
RBC # UR: ABNORMAL /HPF
RBC MORPH BLD: NORMAL
REF LAB TEST METHOD: ABNORMAL
SODIUM BLD-SCNC: 142 MMOL/L (ref 136–145)
SODIUM BLD-SCNC: 142 MMOL/L (ref 136–145)
SP GR UR STRIP: 1.01 (ref 1–1.03)
SQUAMOUS #/AREA URNS HPF: ABNORMAL /HPF
TROPONIN T SERPL-MCNC: <0.01 NG/ML (ref 0–0.03)
TROPONIN T SERPL-MCNC: <0.01 NG/ML (ref 0–0.03)
UROBILINOGEN UR QL STRIP: ABNORMAL
VIT B12 BLD-MCNC: 540 PG/ML (ref 211–946)
WBC MORPH BLD: NORMAL
WBC NRBC COR # BLD: 6.99 10*3/MM3 (ref 3.4–10.8)
WBC UR QL AUTO: ABNORMAL /HPF

## 2019-10-21 PROCEDURE — 83690 ASSAY OF LIPASE: CPT | Performed by: PHYSICIAN ASSISTANT

## 2019-10-21 PROCEDURE — G0378 HOSPITAL OBSERVATION PER HR: HCPCS

## 2019-10-21 PROCEDURE — 25010000002 CEFTRIAXONE PER 250 MG: Performed by: PHYSICIAN ASSISTANT

## 2019-10-21 PROCEDURE — 96365 THER/PROPH/DIAG IV INF INIT: CPT

## 2019-10-21 PROCEDURE — 85007 BL SMEAR W/DIFF WBC COUNT: CPT | Performed by: EMERGENCY MEDICINE

## 2019-10-21 PROCEDURE — 93005 ELECTROCARDIOGRAM TRACING: CPT | Performed by: EMERGENCY MEDICINE

## 2019-10-21 PROCEDURE — 80048 BASIC METABOLIC PNL TOTAL CA: CPT | Performed by: INTERNAL MEDICINE

## 2019-10-21 PROCEDURE — 84484 ASSAY OF TROPONIN QUANT: CPT | Performed by: NURSE PRACTITIONER

## 2019-10-21 PROCEDURE — 25010000002 HYDRALAZINE PER 20 MG: Performed by: NURSE PRACTITIONER

## 2019-10-21 PROCEDURE — 96375 TX/PRO/DX INJ NEW DRUG ADDON: CPT

## 2019-10-21 PROCEDURE — 71045 X-RAY EXAM CHEST 1 VIEW: CPT

## 2019-10-21 PROCEDURE — 84484 ASSAY OF TROPONIN QUANT: CPT | Performed by: PHYSICIAN ASSISTANT

## 2019-10-21 PROCEDURE — 85025 COMPLETE CBC W/AUTO DIFF WBC: CPT | Performed by: EMERGENCY MEDICINE

## 2019-10-21 PROCEDURE — 81001 URINALYSIS AUTO W/SCOPE: CPT | Performed by: EMERGENCY MEDICINE

## 2019-10-21 PROCEDURE — 80053 COMPREHEN METABOLIC PANEL: CPT | Performed by: EMERGENCY MEDICINE

## 2019-10-21 PROCEDURE — 96361 HYDRATE IV INFUSION ADD-ON: CPT

## 2019-10-21 PROCEDURE — 99244 OFF/OP CNSLTJ NEW/EST MOD 40: CPT | Performed by: INTERNAL MEDICINE

## 2019-10-21 PROCEDURE — 99220 PR INITIAL OBSERVATION CARE/DAY 70 MINUTES: CPT | Performed by: INTERNAL MEDICINE

## 2019-10-21 PROCEDURE — 82728 ASSAY OF FERRITIN: CPT | Performed by: INTERNAL MEDICINE

## 2019-10-21 PROCEDURE — 82607 VITAMIN B-12: CPT | Performed by: INTERNAL MEDICINE

## 2019-10-21 PROCEDURE — 93306 TTE W/DOPPLER COMPLETE: CPT | Performed by: INTERNAL MEDICINE

## 2019-10-21 PROCEDURE — 84100 ASSAY OF PHOSPHORUS: CPT | Performed by: EMERGENCY MEDICINE

## 2019-10-21 PROCEDURE — 83605 ASSAY OF LACTIC ACID: CPT | Performed by: INTERNAL MEDICINE

## 2019-10-21 PROCEDURE — 93005 ELECTROCARDIOGRAM TRACING: CPT | Performed by: NURSE PRACTITIONER

## 2019-10-21 PROCEDURE — 74176 CT ABD & PELVIS W/O CONTRAST: CPT

## 2019-10-21 PROCEDURE — 93306 TTE W/DOPPLER COMPLETE: CPT

## 2019-10-21 PROCEDURE — 99284 EMERGENCY DEPT VISIT MOD MDM: CPT

## 2019-10-21 PROCEDURE — 83605 ASSAY OF LACTIC ACID: CPT | Performed by: EMERGENCY MEDICINE

## 2019-10-21 PROCEDURE — 84145 PROCALCITONIN (PCT): CPT | Performed by: EMERGENCY MEDICINE

## 2019-10-21 PROCEDURE — 83735 ASSAY OF MAGNESIUM: CPT | Performed by: EMERGENCY MEDICINE

## 2019-10-21 RX ORDER — SODIUM CHLORIDE 9 MG/ML
100 INJECTION, SOLUTION INTRAVENOUS CONTINUOUS
Status: ACTIVE | OUTPATIENT
Start: 2019-10-21 | End: 2019-10-21

## 2019-10-21 RX ORDER — SIMETHICONE 80 MG
80 TABLET,CHEWABLE ORAL 4 TIMES DAILY PRN
Status: DISCONTINUED | OUTPATIENT
Start: 2019-10-21 | End: 2019-10-23 | Stop reason: HOSPADM

## 2019-10-21 RX ORDER — ACETAMINOPHEN 325 MG/1
650 TABLET ORAL EVERY 4 HOURS PRN
Status: DISCONTINUED | OUTPATIENT
Start: 2019-10-21 | End: 2019-10-23 | Stop reason: HOSPADM

## 2019-10-21 RX ORDER — SODIUM CHLORIDE 9 MG/ML
100 INJECTION, SOLUTION INTRAVENOUS CONTINUOUS
Status: ACTIVE | OUTPATIENT
Start: 2019-10-21 | End: 2019-10-22

## 2019-10-21 RX ORDER — IBUPROFEN 600 MG/1
600 TABLET ORAL EVERY 4 HOURS PRN
Status: DISCONTINUED | OUTPATIENT
Start: 2019-10-21 | End: 2019-10-23 | Stop reason: HOSPADM

## 2019-10-21 RX ORDER — LEVOTHYROXINE SODIUM 0.07 MG/1
75 TABLET ORAL
Status: DISCONTINUED | OUTPATIENT
Start: 2019-10-21 | End: 2019-10-22

## 2019-10-21 RX ORDER — SODIUM CHLORIDE 0.9 % (FLUSH) 0.9 %
10 SYRINGE (ML) INJECTION AS NEEDED
Status: DISCONTINUED | OUTPATIENT
Start: 2019-10-21 | End: 2019-10-23 | Stop reason: HOSPADM

## 2019-10-21 RX ORDER — HYDRALAZINE HYDROCHLORIDE 20 MG/ML
10 INJECTION INTRAMUSCULAR; INTRAVENOUS ONCE
Status: COMPLETED | OUTPATIENT
Start: 2019-10-21 | End: 2019-10-21

## 2019-10-21 RX ORDER — ONDANSETRON 2 MG/ML
4 INJECTION INTRAMUSCULAR; INTRAVENOUS ONCE
Status: DISCONTINUED | OUTPATIENT
Start: 2019-10-21 | End: 2019-10-23

## 2019-10-21 RX ORDER — ACETAMINOPHEN 160 MG/5ML
650 SOLUTION ORAL EVERY 4 HOURS PRN
Status: DISCONTINUED | OUTPATIENT
Start: 2019-10-21 | End: 2019-10-23 | Stop reason: HOSPADM

## 2019-10-21 RX ORDER — ONDANSETRON 4 MG/1
4 TABLET, FILM COATED ORAL EVERY 6 HOURS PRN
Status: DISCONTINUED | OUTPATIENT
Start: 2019-10-21 | End: 2019-10-23 | Stop reason: HOSPADM

## 2019-10-21 RX ORDER — SODIUM CHLORIDE 0.9 % (FLUSH) 0.9 %
10 SYRINGE (ML) INJECTION EVERY 12 HOURS SCHEDULED
Status: DISCONTINUED | OUTPATIENT
Start: 2019-10-21 | End: 2019-10-23 | Stop reason: HOSPADM

## 2019-10-21 RX ORDER — ACETAMINOPHEN 650 MG/1
650 SUPPOSITORY RECTAL EVERY 4 HOURS PRN
Status: DISCONTINUED | OUTPATIENT
Start: 2019-10-21 | End: 2019-10-23 | Stop reason: HOSPADM

## 2019-10-21 RX ORDER — HYDROCODONE BITARTRATE AND ACETAMINOPHEN 5; 325 MG/1; MG/1
1 TABLET ORAL EVERY 6 HOURS PRN
Status: DISCONTINUED | OUTPATIENT
Start: 2019-10-21 | End: 2019-10-23 | Stop reason: HOSPADM

## 2019-10-21 RX ORDER — PANTOPRAZOLE SODIUM 40 MG/1
40 TABLET, DELAYED RELEASE ORAL EVERY MORNING
Status: DISCONTINUED | OUTPATIENT
Start: 2019-10-21 | End: 2019-10-23 | Stop reason: HOSPADM

## 2019-10-21 RX ORDER — ONDANSETRON 2 MG/ML
4 INJECTION INTRAMUSCULAR; INTRAVENOUS EVERY 6 HOURS PRN
Status: DISCONTINUED | OUTPATIENT
Start: 2019-10-21 | End: 2019-10-23 | Stop reason: HOSPADM

## 2019-10-21 RX ORDER — LISINOPRIL 5 MG/1
5 TABLET ORAL DAILY
Status: DISCONTINUED | OUTPATIENT
Start: 2019-10-21 | End: 2019-10-23 | Stop reason: HOSPADM

## 2019-10-21 RX ADMIN — ACETAMINOPHEN 650 MG: 325 TABLET ORAL at 15:47

## 2019-10-21 RX ADMIN — HYDRALAZINE HYDROCHLORIDE 10 MG: 20 INJECTION INTRAMUSCULAR; INTRAVENOUS at 20:47

## 2019-10-21 RX ADMIN — Medication 80 MG: at 12:22

## 2019-10-21 RX ADMIN — SODIUM CHLORIDE 1000 ML: 9 INJECTION, SOLUTION INTRAVENOUS at 04:24

## 2019-10-21 RX ADMIN — PANTOPRAZOLE SODIUM 40 MG: 40 TABLET, DELAYED RELEASE ORAL at 08:19

## 2019-10-21 RX ADMIN — HYDROCODONE BITARTRATE AND ACETAMINOPHEN 1 TABLET: 5; 325 TABLET ORAL at 16:44

## 2019-10-21 RX ADMIN — CEFTRIAXONE 1 G: 1 INJECTION, POWDER, FOR SOLUTION INTRAMUSCULAR; INTRAVENOUS at 08:19

## 2019-10-21 RX ADMIN — SODIUM CHLORIDE 100 ML/HR: 9 INJECTION, SOLUTION INTRAVENOUS at 17:31

## 2019-10-21 RX ADMIN — ACETAMINOPHEN 650 MG: 325 TABLET ORAL at 20:47

## 2019-10-21 RX ADMIN — LISINOPRIL 5 MG: 5 TABLET ORAL at 08:19

## 2019-10-21 RX ADMIN — SODIUM CHLORIDE, PRESERVATIVE FREE 10 ML: 5 INJECTION INTRAVENOUS at 08:19

## 2019-10-21 RX ADMIN — SODIUM CHLORIDE 100 ML/HR: 9 INJECTION, SOLUTION INTRAVENOUS at 08:08

## 2019-10-21 NOTE — PLAN OF CARE
Problem: Patient Care Overview  Goal: Plan of Care Review  Outcome: Ongoing (interventions implemented as appropriate)   10/21/19 1039   Coping/Psychosocial   Plan of Care Reviewed With patient   Plan of Care Review   Progress no change   OTHER   Outcome Summary pt. resting in bed still feels uncomfortable, still iker, up ad nella       Problem: Cardiac Output Decreased (Adult)  Goal: Identify Related Risk Factors and Signs and Symptoms  Outcome: Ongoing (interventions implemented as appropriate)   10/21/19 1039   Cardiac Output Decreased (Adult)   Related Risk Factors (Cardiac Output Decreased) heart rhythm altered   Signs and Symptoms (Cardiac Output Decreased) shortness of breath

## 2019-10-21 NOTE — ED PROVIDER NOTES
Subjective   39-year-old female presents with for evaluation of low heart rate and upper abdominal pain.  Patient was discharged from the hospital yesterday after receiving admission for urinary tract infection associated with ureterolithiasis.  During the hospitalization the patient was identified to have an E. coli urinary tract infection.  She also had a course of hypotension that required a short stay in the ICU before improvement.  She reports that during the last 2 days she has developed upper abdominal pain that radiates up into the center of the chest.  This was present while she was in the hospital but it was felt to be distention of the bowel secondary to hospitalization and narcotic administration.  She does report prior to leaving the hospital that the abdominal pain was improved.  She reports since being discharged the abdominal pain has continued to worsen. She reports no significant passage of flatus but only passage of loose stool since leaving the hospital.  She also reports that her heart rate has been decreasing into the 40s while at home.  She reports her typical baseline heart rate in the 70s.  She reports low-grade fever since leaving the hospital.  She denies any shortness of breath but does report the sensation that she has to force herself to take a deep breath.  She currently is taking antibiotics for treatment of urinary tract infection.  She has taken a single oral dose of Cefpodoxime since leaving hospital.  She received a combination of Rocephin and meropenem while in the hospital.            Review of Systems   Constitutional: Positive for fatigue and fever.   HENT: Negative for congestion, rhinorrhea and sore throat.    Respiratory: Positive for shortness of breath. Negative for cough and wheezing.    Cardiovascular: Positive for chest pain.   Gastrointestinal: Positive for abdominal pain, diarrhea and nausea. Negative for vomiting.   Genitourinary: Negative for difficulty urinating  and dysuria.   Musculoskeletal: Negative for neck stiffness.   Skin: Negative for rash.   Neurological: Negative for dizziness.   Psychiatric/Behavioral: Negative for agitation and hallucinations.       Past Medical History:   Diagnosis Date   • Anxiety    • Depression    • Disease of thyroid gland    • GERD (gastroesophageal reflux disease)    • Kidney stone        Allergies   Allergen Reactions   • Prednisone Other (See Comments)     Makes her crazy    • Brethaire [Terbutaline]    • Zithromax [Azithromycin] GI Intolerance   • Amoxicillin Rash     Received ceftriaxone 10/2019       Past Surgical History:   Procedure Laterality Date   • CHOLECYSTECTOMY     • LEEP         History reviewed. No pertinent family history.    Social History     Socioeconomic History   • Marital status:      Spouse name: Not on file   • Number of children: Not on file   • Years of education: Not on file   • Highest education level: Not on file   Tobacco Use   • Smoking status: Former Smoker     Last attempt to quit: 2013     Years since quittin.6   Substance and Sexual Activity   • Alcohol use: Yes     Alcohol/week: 1.8 - 3.6 oz     Types: 1 - 2 Glasses of wine, 1 - 2 Cans of beer, 1 - 2 Shots of liquor per week     Comment: OCCAS   • Drug use: No   • Sexual activity: Defer           Objective   Physical Exam   Constitutional: She is oriented to person, place, and time. She appears well-developed and well-nourished.  Non-toxic appearance. No distress.   FAtigued appearing   HENT:   Head: Normocephalic and atraumatic.   Right Ear: External ear normal.   Left Ear: External ear normal.   Nose: Nose normal.   Eyes: EOM and lids are normal. Pupils are equal, round, and reactive to light.   Neck: Normal range of motion. Neck supple. No tracheal deviation present.   Cardiovascular: Regular rhythm and normal heart sounds. Bradycardia present. Exam reveals no gallop, no friction rub and no decreased pulses.   No murmur  heard.  Pulmonary/Chest: Effort normal and breath sounds normal. No respiratory distress. She has no decreased breath sounds. She has no wheezes. She has no rhonchi. She has no rales.   Abdominal: Soft. Normal appearance and bowel sounds are normal. There is generalized tenderness. There is no rebound, no guarding, no tenderness at McBurney's point and negative Rosen's sign.   Musculoskeletal: Normal range of motion. She exhibits no deformity.   Lymphadenopathy:     She has no cervical adenopathy.   Neurological: She is alert and oriented to person, place, and time. She has normal strength. No cranial nerve deficit or sensory deficit.   Skin: Skin is warm and dry. No rash noted. She is not diaphoretic.   Psychiatric: She has a normal mood and affect. Her speech is normal and behavior is normal. Judgment and thought content normal. Cognition and memory are normal.   Nursing note and vitals reviewed.      Procedures           ED Course                  MDM  Number of Diagnoses or Management Options  Bradycardia, sinus: new and requires workup  Dyspnea, unspecified type: new and requires workup  Upper abdominal pain: new and requires workup  Diagnosis management comments: During the emergency department course the patient was identified to become bradycardia to approximately 48.  This has lead to the feeling of shortness of breath and is keeping her from sleeping.    The patient has been initiated on Cefpodoxime and a rare complication is bradycardia.  This might be contributing to the patient's current bradycardia.    The patient also complains of upper abdominal pain therefore repeat CT scan of the abdomen/pelvis was performed.  No acute abnormalities identified on CT scan of the abdomen/pelvis.  I am concerned the patient's upper abdominal pain is related to poor emptying of the bowels and potential ileus/slowed transit from recent hospitalization and pain medication administration.    The patient's repeat  urinalysis here today is essentially unchanged compared to previous.    Laboratory evaluation shows normal electrolytes.    I discussed the patient with the hospitalist, Dr. Durbin, who will admit for adjustment of antibiotics, and continued observation.    At the time of admission the patient is stable and is currently refraining from any further pain medication use.         Amount and/or Complexity of Data Reviewed  Clinical lab tests: ordered and reviewed  Tests in the radiology section of CPT®: ordered and reviewed  Decide to obtain previous medical records or to obtain history from someone other than the patient: yes  Review and summarize past medical records: yes  Discuss the patient with other providers: yes  Independent visualization of images, tracings, or specimens: yes        Final diagnoses:   Bradycardia, sinus   Upper abdominal pain   Dyspnea, unspecified type              Son Betancur MD  10/21/19 2456

## 2019-10-21 NOTE — CONSULTS
[facility] at [facility] - Cardiology Consult    Brandy Garcia  1980  S226/1    Admit Date:  10/21/2019  Consult Date:  10/21/19          PCP:  Emily Solano APRN Req MD:  Dr. Durbin - Hospitalist  Consulting MD:  Dr. Yoel Cortez  Primary Cardiologist:  Dr. Leija        CC:  General Malaise    Reason for Consult: Sinus Bradycardia        PROBLEM LIST/PMHx:    1.  Sinus Bradycardia   A.  2D echo, 07/21/2015, estimated EF >65%. Normal left ventricular diastolic filling is observed. The inferior vena  cava appears normal in size. IVC is collapsed at rest-consistent with decreased intravascular volume.    B.  Echo 10/21/19, Dr. Burris:  EF 65%, mild MR/TR.  RVSP 27mmHg.  LA 4cm.     2.  Essential Hypertension  3.  Recent UTI   A.  Admission 9/16-9/24 for obstructive uropathy and sepsis 2/2 E.Coli UTI with left nephrolithiasis.   B.  Discharged tera on Cefpodoxime   4.  GERD without esophagitis  5.  Hypothyroidism, on chronic supplementation  6.  Anxiety/Depression  7.  Kidney Stone  8.  Former Smoking Tobacco abuse, quit 2013  9.  Obesity, Body mass index is 41.97 kg/m².   10.  H/O Sinus Tachycardia   A.  Normal echo 2015 and this admission   B.  Holter 2/17/17:  Normal Sinus Rhythm, Rare PAC, no PVCs, no arrhythmias or pauses otherwise.        Allergies:  is allergic to prednisone; brethaire [terbutaline]; zithromax [azithromycin]; and amoxicillin.    Medications Prior to Admission   Medication Sig Dispense Refill Last Dose   • levothyroxine sodium (TIROSINT) 75 MCG capsule Take 1 capsule by mouth Daily. 30 capsule 0 10/20/2019 at Unknown time   • cefpodoxime (VANTIN) 200 MG tablet Take 1 tablet by mouth Every 12 (Twelve) Hours for 10 days. 20 tablet 0    • lisinopril (PRINIVIL,ZESTRIL) 5 MG tablet Take 1 tablet by mouth daily 30 tablet 0    • omeprazole (priLOSEC) 20 MG capsule Take 20 mg by mouth Daily As Needed.          Current Hospital Scheduled Meds:  ceftriaxone 1 g  "Intravenous Q24H   levothyroxine 75 mcg Oral Q AM   lisinopril 5 mg Oral Daily   ondansetron 4 mg Intravenous Once   pantoprazole 40 mg Oral QAM   sodium chloride 10 mL Intravenous Q12H           CARDIAC RISK FACTORS:   hypertension, obesity (BMI >= 30 kg/m2) and smoking/ tobacco exposure.         HPI:  Brandymakayla Garcia is a 39 y.o. female with PMHx of hypothyroidism, hypertension, nephrolithiasis, and recent admission from 9/16- 9/24 for obstructive uropathy and sepsis from E. coli with left pyelonephritis who presents to the ER less than 24 hours after discharge with complaints of epigastric abdominal discomfort/fullness, shortness of air, and slow heart rate.     Patient states that she began having worsening epigastric fullness yesterday soon after taking Cefpodoxime.  She states that she has been short of air and having difficulty lying flat.  She states when she starts to lie flat, she feels as if she is \"falling\".  Patient has been checking her pulse and noticing that she is significantly bradycardic during these episodes.  She states she has had insomnia related to the anxiety of her symptoms and therefore decided come back to the ER.  She was admitted early this  Morning to the hospitalists.      Cardiology has been asked to see Ms. Garcia in consultation for further evaluation of her low rates reported at home.   EKG at time of admission shows NSR, HR 60 bpm.  Her EKG last week actually showed Sinus Tachycardia.  She states her rates are getting in the 40s, no lower.  They are mostly that low when she's resting or relaxed.  She denies skipped beats or irregularities.  She denies dizziness or pre syncope.  Her chest discomfort is a pressure that starts mid-upper gastric and radiates up into throat.  It also radiates into back but not down arms.  It occurs at rest and worse with lying flat.  She feels better sitting up or lying on her side.  She denies cough, denies wheezing.  In addition to the " "Cefpodoxime, the only new medication Rx at time of DC is Protonix.  She's never had a reaction to any medication like this before.    When asked about sleep apnea, she's never been tested.  She has a history of Sinus Tachycardia, but this was when her thyroid function was abnormal.         Patient feels most of her symptoms may be due to GI causes.  She is wondering if she is having a vagal response to abdominal pain.  Mother is wondering if she could have residual stones even after cholecystectomy.        Social History     Socioeconomic History   • Marital status:      Spouse name: Not on file   • Number of children: Not on file   • Years of education: Not on file   • Highest education level: Not on file   Tobacco Use   • Smoking status: Former Smoker     Last attempt to quit: 2013     Years since quittin.6   Substance and Sexual Activity   • Alcohol use: Yes     Alcohol/week: 1.8 - 3.6 oz     Types: 1 - 2 Glasses of wine, 1 - 2 Cans of beer, 1 - 2 Shots of liquor per week     Comment: OCCAS   • Drug use: No   • Sexual activity: Defer     History reviewed. No pertinent family history.  Past Surgical History:   Procedure Laterality Date   • CHOLECYSTECTOMY     • LEEP       ROS: Pertinent items are noted in HPI, all other systems reviewed and negative     Objective     height is 167.6 cm (66\") and weight is 118 kg (260 lb). Her oral temperature is 98.7 °F (37.1 °C). Her blood pressure is 160/96 and her pulse is 56. Her respiration is 18 and oxygen saturation is 97%.    Intake/Output Summary (Last 24 hours) at 10/21/2019 1051  Last data filed at 10/21/2019 0540  Gross per 24 hour   Intake 1000 ml   Output --   Net 1000 ml       Physical exam:  General Appearance:   · well developed  · well nourished  HENT:   · oropharynx moist  · lips not cyanotic  Neck:  · thyroid not enlarged  · supple  Respiratory:  · no respiratory distress  · normal breath sounds  · no rales  Cardiovascular:  · no jugular venous " distention  · regular rhythm  · apical impulse normal  · S1 normal, S2 normal  · no S3, no S4   · no murmur  · no rub, no thrill  · carotid pulses normal; no bruit  · pedal pulses normal  · lower extremity edema: none    Gastrointestinal:   · bowel sounds normal  · non-tender  · no hepatomegaly, no splenomegaly  Musculoskeletal:  · no clubbing of fingers.   · normocephalic, head atraumatic  Skin:   · warm  · dry  Psychiatric:  · judgement and insight appropriate  · normal mood and affect           Results Review:  I personally reviewed the patient's clinical results.  Results from last 7 days   Lab Units 10/21/19  0424   WBC 10*3/mm3 6.99   HEMOGLOBIN g/dL 11.0*   HEMATOCRIT % 33.9*   PLATELETS 10*3/mm3 351     Results from last 7 days   Lab Units 10/21/19  0424   SODIUM mmol/L 142   POTASSIUM mmol/L 3.8   CHLORIDE mmol/L 107   CO2 mmol/L 26.0   BUN mg/dL 9   CREATININE mg/dL 0.58   CALCIUM mg/dL 9.4   BILIRUBIN mg/dL 0.2   ALK PHOS U/L 70   ALT (SGPT) U/L 40*   AST (SGOT) U/L 30   GLUCOSE mg/dL 106*         Results from last 7 days   Lab Units 10/17/19  0447   TSH uIU/mL 3.680   FREE T4 ng/dL 1.50     Troponin:  <0.01  M        UA:  No bacteria, small leukocytes      Radiology:  Imaging Results (last 72 hours)     Procedure Component Value Units Date/Time    XR Chest 1 View [[id number]] Collected:  10/21/19 0450     Updated:  10/21/19 0452    Narrative:       CR Chest 1 Vw    INDICATION:   Shortness of breath with bradycardia prior to arrival     COMPARISON:    10/19/2019    FINDINGS:  Single portable AP view(s) of the chest.  The heart and mediastinal contours are normal. The lungs are clear. No pneumothorax or pleural effusion.      Impression:       No acute cardiopulmonary findings.    Signer Name: Yoel Marques MD   Signed: 10/21/2019 4:50 AM   Workstation Name: [id number]    [facility]    CT Abdomen Pelvis Without Contrast [[id number]] Collected:  10/21/19 0411     Updated:   10/21/19 0413    Narrative:       CT Abdomen Pelvis WO    INDICATION:   New onset of epigastric pain    TECHNIQUE:   CT of the abdomen and pelvis without IV contrast. Coronal and sagittal reconstructions were obtained.  Radiation dose reduction techniques included automated exposure control or exposure modulation based on body size. Count of known CT and cardiac nuc  med studies performed in previous 12 months: 1.     COMPARISON:   10/16/2019    FINDINGS:  Abdomen: The liver, spleen and pancreas are normal in size. There is a 2 to 3 mm nonobstructing left kidney stone in the lower pole. No hydronephrosis on either side. No retroperitoneal adenopathy. No distended bowel loops.    Pelvis: Normal appendix. No evidence of adenopathy, mass or fluid collection.      Impression:       No change from the previous CT scan of 10/16/2019. No evidence of developing hydronephrosis. No evidence of inflammatory bowel disease or obstruction.          Signer Name: Yoel Marques MD   Signed: 10/21/2019 4:11 AM   Workstation Name: [facility]    [facility]            Tele:  NSR    Assessment/Plan     1.  Sinus Bradycardia    -  38 yo CF with recent urosepsis (E.coli) treated with new antibioticCefpodoxime /protonix.   -  Presents with feeling of fullness, generalized weakness and reports heart rates at home were in the 40s.  No true bradycardia documented this admission.   -  I walked in room while patient was sleeping, HR 49 bpm.  As soon as she awoke, sat up, started to talk, HR was in mid 60s-low 70s, NSR.  I do not appreciate that she is symptomatic at all with rates.   -  Antibiotic has been changes.  IV fluids administered.   -  Would place a monitor to wear at time of DC to evaluated HR variation. Order in computer, our office is aware.   Also discussed her weight and possibility of undiagnosed sleep apnea.  Will schedule outpatient sleep study evaluation. Our office is aware.     -  We will see on a  PRN basis after today.  Please call if bradycardia is present/ patient becomes symptomatic.  Will arrange Follow up with Dr. Cortez in 4-6 weeks (order in computer) with repeat EKG.     Symptoms appear to be vaguely mediated likely due to abdominal pain, will place a 30-day MCOT at discharge.    2.  Essential Hypertension   -  Fairly controlled   -  Continue meds and monitor.    3.  Generalized weakness   -  With recent UTI/uropathy/sepsis with E.Coli   -  Per MDs.    4.  Chest fullness   -  Pain does not appear to be cardiac   -  EKG shows NSR, no acute changes.  Initial troponin is WNL   -  Echo performed and reviewed:  Nl EF, no wall motion or valvular abnormalities.  No need for ischemic evaluation at this time.      I discussed the patients findings and my recommendations with the patient, any present family members, and the nursing staff.  Yoel Cortez MD saw and examined patient, verified hx and PE, read all radiographic studies, reviewed labs and micro data, and formulated dx, plan for treatment and all medical decision making.      Latrice Cook PA-C, working with Yoel Cortez MD  10/21/19 10:51 AM    Electronically signed by RACHANA Romero, 10/21/19, 11:27 AM.    I have seen and examined the patient, performing a face-to-face diagnostic evaluation with plan of care reviewed and developed with the Advanced Practice Clinician and nursing staff. I have addended and modified the above history of present illness, physical examination, and assessment and plan to reflect my findings and impressions    Yoel Cortez MD, FACC  10/21/19

## 2019-10-21 NOTE — PROGRESS NOTES
Patient seen and examined. Plan updated per below     Assessment/Plan   Assessment / Plan     Active Hospital Problems    Diagnosis  POA   • Symptomatic sinus bradycardia [R00.1]  Yes   • Recent UTI [N39.0]  Yes   • GERD without esophagitis [K21.9]  Yes      Resolved Hospital Problems   No resolved problems to display.        Brief Hospital Course to date:  Patient is a 39 y.o. female with past medical history of hypothyroidism, hypertension, nephrolithiasis, and recent admission from 9/16- 9/24 for obstructive uropathy and sepsis from E. coli with left pyelonephritis who presents to the ER less than 24 hours after discharge with complaints of epigastric abdominal discomfort/fullness, shortness of air, and slow heart rate.     Symptomatic bradycardia  --Unclear etiology, possibly related as a rare complication to Cefpodoxime.  Will discontinue and continue ceftriaxone which patient previously tolerated well  --Consult cardiology; HR responds when she is awake, iker into the 40s with sleeping; outpatient 4-6 week follow up, Holter monitor and sleep study   --EKG without ischemic changes  --echo with normal EF; no significant valvular or wall motion abnormalities   --troponin negative       E. coli urinary tract infection, present on admission  --ceftriaxone; will continue for now   --lactic acid normal  --CT abd pelvis essentially unchanged, nonobstructive stone noted.     Epigastric pain  --unclear etiology.    -- lipase mildly elevated; no CT findings   --diarrhea likely related to recent mag citrate admin     Normocytic anemia  --increased from discharge; ferritin ok; B12 pending      DVT prophylaxis: scds  CODE STATUS:   Code Status and Medical Interventions:   Ordered at: 10/21/19 0622     Level Of Support Discussed With:    Patient     Code Status:    CPR     Medical Interventions (Level of Support Prior to Arrest):    Full         Electronically signed by Kimi Pires DO, 10/21/19, 1:11 PM.

## 2019-10-21 NOTE — OUTREACH NOTE
Prep Survey      Responses   Facility patient discharged from?  [place]   Is patient eligible?  No   What are the reasons patient is not eligible?  Readmitted   Does the patient have one of the following disease processes/diagnoses(primary or secondary)?  Other   Prep survey completed?  Yes          Krisatl Rodriguez RN

## 2019-10-21 NOTE — H&P
"    [facility] Medicine Services  HISTORY AND PHYSICAL    Patient Name: Brandy Garcia  : 1980  MRN: [id number]  Primary Care Physician: Emily Solano APRN  Date of admission: 10/21/2019      Subjective   Subjective     Chief Complaint:  \"I just do not feel right\"    HPI:  Brandy Garcia is a 39 y.o. female with past medical history of hypothyroidism, hypertension, nephrolithiasis, and recent admission from -  for obstructive uropathy and sepsis from E. coli with left pyelonephritis who presents to the ER less than 24 hours after discharge with complaints of epigastric abdominal discomfort/fullness, shortness of air, and slow heart rate.    Patient states that she began having worsening epigastric fullness yesterday soon after taking Cefpodoxime.  She states that she has been short of air and having difficulty lying flat.  She states when she starts to lie flat, she feels as if she is \"falling\".  Patient has been checking her pulse and noticing that she is significantly bradycardic during these episodes.  She states she has had insomnia related to the anxiety of her symptoms and therefore decided come back to the ER.    Reports associated nausea, decreased appetite, low-grade fever, diaphoresis.    Review of Systems   Gen-reports low-grade fever, no chills  CV- No chest pain, reports chest pressure, denies palpitations  Resp- No cough, dyspnea  GI-reports nausea no vomiting, reports diarrhea, reports epigastric abd pain        All other systems reviewed and are negative.     Personal History     Past Medical History:   Diagnosis Date   • Anxiety    • Depression    • Disease of thyroid gland    • GERD (gastroesophageal reflux disease)    • Kidney stone        Past Surgical History:   Procedure Laterality Date   • CHOLECYSTECTOMY     • LEEP         Family History: family history is not on file. Otherwise pertinent FHx was reviewed and unremarkable.     Social History:  " reports that she quit smoking about 6 years ago. She does not have any smokeless tobacco history on file. She reports that she drinks about 1.8 - 3.6 oz of alcohol per week. She reports that she does not use drugs.  Social History     Social History Narrative   • Not on file       Medications:    Available home medication information reviewed.    (Not in a hospital admission)    Allergies   Allergen Reactions   • Prednisone Other (See Comments)     Makes her crazy    • Brethaire [Terbutaline]    • Zithromax [Azithromycin] GI Intolerance   • Amoxicillin Rash     Received ceftriaxone 10/2019       Objective   Objective     Vital Signs:   Temp:  [98.6 °F (37 °C)] 98.6 °F (37 °C)  Heart Rate:  [49-74] 56  Resp:  [14] 14  BP: (148-172)/() 148/87        Physical Exam   Constitutional: Awake, alert, appears fatigued  Eyes: PERRLA, sclerae anicteric, no conjunctival injection  HENT: NCAT, mucous membranes moist  Neck: Supple, no thyromegaly, no lymphadenopathy, trachea midline  Respiratory: Clear to auscultation bilaterally, nonlabored respirations   Cardiovascular: Bradycardic, no murmurs, rubs, or gallops, palpable pedal pulses bilaterally  Gastrointestinal: Positive bowel sounds, soft, nontender, mild distention  Musculoskeletal: No bilateral ankle edema, no clubbing or cyanosis to extremities  Psychiatric: Appropriate affect, cooperative  Neurologic: Oriented x 3, strength symmetric in all extremities, Cranial Nerves grossly intact to confrontation, speech clear  Skin: No rashes          Results Reviewed:  I have personally reviewed current lab and radiology data.      EKG personally reviewed and reveals rate of 60, sinus.  No ischemic changes  Results from last 7 days   Lab Units 10/21/19  0424   WBC 10*3/mm3 6.99   HEMOGLOBIN g/dL 11.0*   HEMATOCRIT % 33.9*   PLATELETS 10*3/mm3 351     Results from last 7 days   Lab Units 10/21/19  0424   SODIUM mmol/L 142   POTASSIUM mmol/L 3.8   CHLORIDE mmol/L 107   CO2  mmol/L 26.0   BUN mg/dL 9   CREATININE mg/dL 0.58   GLUCOSE mg/dL 106*   CALCIUM mg/dL 9.4   ALT (SGPT) U/L 40*   AST (SGOT) U/L 30   LACTATE mmol/L 0.7   PROCALCITONIN ng/mL 0.73*     Estimated Creatinine Clearance: 174.8 mL/min (by C-G formula based on SCr of 0.58 mg/dL).  Brief Urine Lab Results  (Last result in the past 365 days)      Color   Clarity   Blood   Leuk Est   Nitrite   Protein   CREAT   Urine HCG        10/21/19 0424 Yellow Clear Negative Small (1+) Negative Negative             Imaging Results (last 24 hours)     Procedure Component Value Units Date/Time    XR Chest 1 View [[id number]] Collected:  10/21/19 0450     Updated:  10/21/19 0452    Narrative:       CR Chest 1 Vw    INDICATION:   Shortness of breath with bradycardia prior to arrival     COMPARISON:    10/19/2019    FINDINGS:  Single portable AP view(s) of the chest.  The heart and mediastinal contours are normal. The lungs are clear. No pneumothorax or pleural effusion.      Impression:       No acute cardiopulmonary findings.    Signer Name: Yoel Marques MD   Signed: 10/21/2019 4:50 AM   Workstation Name: [id number]    [facility]    CT Abdomen Pelvis Without Contrast [[id number]] Collected:  10/21/19 0411     Updated:  10/21/19 0413    Narrative:       CT Abdomen Pelvis WO    INDICATION:   New onset of epigastric pain    TECHNIQUE:   CT of the abdomen and pelvis without IV contrast. Coronal and sagittal reconstructions were obtained.  Radiation dose reduction techniques included automated exposure control or exposure modulation based on body size. Count of known CT and cardiac nuc  med studies performed in previous 12 months: 1.     COMPARISON:   10/16/2019    FINDINGS:  Abdomen: The liver, spleen and pancreas are normal in size. There is a 2 to 3 mm nonobstructing left kidney stone in the lower pole. No hydronephrosis on either side. No retroperitoneal adenopathy. No distended bowel loops.    Pelvis: Normal  appendix. No evidence of adenopathy, mass or fluid collection.      Impression:       No change from the previous CT scan of 10/16/2019. No evidence of developing hydronephrosis. No evidence of inflammatory bowel disease or obstruction.          Signer Name: Yoel Marques MD   Signed: 10/21/2019 4:11 AM   Workstation Name: [id number]    [facility]        Results for orders placed during the hospital encounter of 07/21/15   CONVERTED (HISTORICAL) ECHO    Narrative Patient:      TIM MARLOW    [facility] Rec#:     [id number]               :          1980            Date:         2015            Age:          35y                   Height:       170.18 cm / 67.0 in  Weight:       117.94 kg / 259.9 lbs  Sex:          F                     BSA:          2.26  Room#:        ER 14                     Sonographer:  Sadia Green,RDCS,RVS  Referring:    RACHAEL  Reading:      Branden Jacobsen MD  Primary:      NO PRIMARY CARE PHYSICIAN  Unit:         Emergency Dept.  ______________________________________________________________________    Transthoracic Echocardiogram    Indication:  dyspnea  BP:           127/92  HR:           105  Rhythm:       Tachycardyia    Conclusions  1. The estimated ejection fraction is greater than 65%.   2. Normal left ventricular diastolic filling is observed.  3. The inferior vena cava appears normal in size.IVC is collapsed at  rest-consistent with decreased intravascular volume.    Findings       Technical Comments:  The study quality is good.      Left Ventricle:  The left ventricular chamber size is normal. The estimated ejection  fraction is greater than 65%.  Normal left ventricular diastolic filling  is observed.     Left Atrium:  The left atrial chamber size is normal.     Right Ventricle:  The right ventricular cavity size is normal. The right ventricular  global systolic function is normal.     Right Atrium:  The right atrial cavity size is normal.  No atrial septal defect is  visualized.     Aortic Valve:  The aortic valve is trileaflet. There is no evidence of aortic  regurgitation. There is no evidence of aortic stenosis.     Mitral Valve:  The mitral valve leaflets appear normal. There is no evidence of mitral  valve prolapse. There is a trace of mitral regurgitation. There is no  evidence of mitral stenosis.     Tricuspid Valve:  The tricuspid valve leaflets are normal.  There is a physiological  tricuspid regurgitation.  Unable to estimate the right ventricular  systolic pressure.     Pulmonic Valve:  The pulmonic valve appears normal. There is a trace pulmonic  regurgitation.      Pericardium:  There is no evidence of pericardial effusion.     Aorta:  There is no dilatation of the aortic root.     Pulmonary Artery:  The main pulmonary artery appears normal.     Venous:  The venous system appears normal. The inferior vena cava appears normal  in size.IVC is collapsed at rest-consistent with decreased intravascular  volume. There is a greater than 50% respiratory change in the inferior  vena cava dimension. The flow pattern of the inferior vena cava appears  normal.     Measurements   Chambers  Name                    Value           Ao root diameter (MM)   2.6 cm          LA dimension (AP) MM    3.3 cm          LA:Ao ratio (MM)        1.27 ratio      RVIDd (AP) 2D           1.78 cm         IVSd (2D)               1.12 cm         LVIDd (2D)              3.37 cm         LVIDs (2D)              2.68 cm         LVPWd (2D)              0.97 cm         Ao root diameter (2D)   2.6 cm          LA dimension (AP) 2D    2.9 cm          LA:Ao ratio (2D)        1.12 ratio        Volumes  Name                    Value           LA ESV SP 4CH (MOD)     18 ml           LA ESV SP 2CH (MOD)     16 ml           LA ESV BP (MOD)         17 ml           LA ESV BP (MOD) index   7.5 ml/m2         Diastolic/Systolic Function  Name                    Value           MV E-wave Vmax           0.88 m/sec      MV A-wave Vmax          0.72 m/sec      MV E:A ratio            1.2 ratio       LV septal e' Vmax       0.07 m/sec      LV lateral e' Vmax      0.12 m/sec      LV E:e' septal ratio    12.8 ratio      LV E:e' lateral ratio   7.4 ratio         Aortic Valve  Name                    Value           AV Vmax                 1.81 m/sec      AV VTI                  32.2 cm         AV peak gradient        13 mmHg         AV mean gradient        7 mmHg          LVOT Vmax               1.13 m/sec      LVOT VTI                20.5 cm         LVOT peak gradient      5 mmHg          LVOT mean gradient      3 mmHg            Pulmonic Valve/Qp:Qs  Name                    Value           PV Vmax                 1.3 m/sec       PV peak gradient        7 mmHg          PV acceleration time    123 msec          Electronically signed by: Branden Jacobsen MD on 07/21/2015 17:43:39       Assessment/Plan   Assessment / Plan     Active Hospital Problems    Diagnosis POA   • Symptomatic sinus bradycardia [R00.1] Yes   • Recent UTI [N39.0] Yes   • GERD without esophagitis [K21.9] Yes     Patient is a 39 y.o. female with past medical history of hypothyroidism, hypertension, nephrolithiasis, and recent admission from 9/16- 9/24 for obstructive uropathy and sepsis from E. coli with left pyelonephritis who presents to the ER less than 24 hours after discharge with complaints of epigastric abdominal discomfort/fullness, shortness of air, and slow heart rate.    1. Symptomatic bradycardia  --Unclear etiology, possibly related as a rare complication to Cefpodoxime.  Will discontinue and continue ceftriaxone which patient previously tolerated well  --Consult cardiology  --EKG without ischemic changes  --echo  --troponin ordered    2.  E. coli urinary tract infection, present on admission  --ceftriaxone  --lactic acid normal  --CT abd pelvis essentially unchanged, nonobstructive stone noted.    3. Epigastric pain  --unclear  etiology.    --check lipase  --diarrhea likely related to recent mag citrate admin    4. Normocytic anemia  --increased from discharge   --anemia workup in progress    DVT prophylaxis: scds    CODE STATUS:    Code Status and Medical Interventions:   Ordered at: 10/21/19 0622     Level Of Support Discussed With:    Patient     Code Status:    CPR     Medical Interventions (Level of Support Prior to Arrest):    Full       Admission Status:  I believe this patient meets OBSERVATION status, however if further evaluation or treatment plans warrant, status may change.  Based upon current information, I predict patient's care encounter to be less than or equal to 2 midnights.      Electronically signed by Manuel Durbin DO, 10/21/19, 6:43 AM.

## 2019-10-22 LAB
ANION GAP SERPL CALCULATED.3IONS-SCNC: 11 MMOL/L (ref 5–15)
BUN BLD-MCNC: 7 MG/DL (ref 6–20)
BUN/CREAT SERPL: 10.9 (ref 7–25)
CALCIUM SPEC-SCNC: 8.7 MG/DL (ref 8.6–10.5)
CHLORIDE SERPL-SCNC: 106 MMOL/L (ref 98–107)
CO2 SERPL-SCNC: 22 MMOL/L (ref 22–29)
CREAT BLD-MCNC: 0.64 MG/DL (ref 0.57–1)
DEPRECATED RDW RBC AUTO: 46.7 FL (ref 37–54)
ERYTHROCYTE [DISTWIDTH] IN BLOOD BY AUTOMATED COUNT: 13 % (ref 12.3–15.4)
FOLATE SERPL-MCNC: 15.2 NG/ML (ref 4.78–24.2)
GFR SERPL CREATININE-BSD FRML MDRD: 104 ML/MIN/1.73
GLUCOSE BLD-MCNC: 86 MG/DL (ref 65–99)
HCT VFR BLD AUTO: 35.7 % (ref 34–46.6)
HGB BLD-MCNC: 10.4 G/DL (ref 12–15.9)
IRON 24H UR-MRATE: 51 MCG/DL (ref 37–145)
IRON SATN MFR SERPL: 16 % (ref 20–50)
MAGNESIUM SERPL-MCNC: 1.9 MG/DL (ref 1.6–2.6)
MCH RBC QN AUTO: 28.5 PG (ref 26.6–33)
MCHC RBC AUTO-ENTMCNC: 29.1 G/DL (ref 31.5–35.7)
MCV RBC AUTO: 97.8 FL (ref 79–97)
PLATELET # BLD AUTO: 335 10*3/MM3 (ref 140–450)
PMV BLD AUTO: 10.1 FL (ref 6–12)
POTASSIUM BLD-SCNC: 3.5 MMOL/L (ref 3.5–5.2)
RBC # BLD AUTO: 3.65 10*6/MM3 (ref 3.77–5.28)
SODIUM BLD-SCNC: 139 MMOL/L (ref 136–145)
TIBC SERPL-MCNC: 325 MCG/DL (ref 298–536)
TRANSFERRIN SERPL-MCNC: 218 MG/DL (ref 200–360)
WBC NRBC COR # BLD: 6.5 10*3/MM3 (ref 3.4–10.8)

## 2019-10-22 PROCEDURE — 84466 ASSAY OF TRANSFERRIN: CPT | Performed by: INTERNAL MEDICINE

## 2019-10-22 PROCEDURE — 96361 HYDRATE IV INFUSION ADD-ON: CPT

## 2019-10-22 PROCEDURE — 99213 OFFICE O/P EST LOW 20 MIN: CPT | Performed by: INTERNAL MEDICINE

## 2019-10-22 PROCEDURE — 83735 ASSAY OF MAGNESIUM: CPT | Performed by: INTERNAL MEDICINE

## 2019-10-22 PROCEDURE — 99225 PR SBSQ OBSERVATION CARE/DAY 25 MINUTES: CPT | Performed by: INTERNAL MEDICINE

## 2019-10-22 PROCEDURE — G0378 HOSPITAL OBSERVATION PER HR: HCPCS

## 2019-10-22 PROCEDURE — 25010000002 CEFTRIAXONE PER 250 MG: Performed by: PHYSICIAN ASSISTANT

## 2019-10-22 PROCEDURE — 96366 THER/PROPH/DIAG IV INF ADDON: CPT

## 2019-10-22 PROCEDURE — 83540 ASSAY OF IRON: CPT | Performed by: INTERNAL MEDICINE

## 2019-10-22 PROCEDURE — 85027 COMPLETE CBC AUTOMATED: CPT | Performed by: INTERNAL MEDICINE

## 2019-10-22 PROCEDURE — 80048 BASIC METABOLIC PNL TOTAL CA: CPT | Performed by: INTERNAL MEDICINE

## 2019-10-22 PROCEDURE — 82746 ASSAY OF FOLIC ACID SERUM: CPT | Performed by: INTERNAL MEDICINE

## 2019-10-22 RX ORDER — CEFUROXIME AXETIL 250 MG/1
250 TABLET ORAL EVERY 12 HOURS SCHEDULED
Status: DISCONTINUED | OUTPATIENT
Start: 2019-10-22 | End: 2019-10-23 | Stop reason: HOSPADM

## 2019-10-22 RX ORDER — LISINOPRIL 10 MG/1
10 TABLET ORAL ONCE
Status: COMPLETED | OUTPATIENT
Start: 2019-10-22 | End: 2019-10-22

## 2019-10-22 RX ORDER — LEVOTHYROXINE SODIUM 75 UG/1
75 CAPSULE ORAL
Status: DISCONTINUED | OUTPATIENT
Start: 2019-10-22 | End: 2019-10-23 | Stop reason: HOSPADM

## 2019-10-22 RX ADMIN — CEFTRIAXONE 1 G: 1 INJECTION, POWDER, FOR SOLUTION INTRAMUSCULAR; INTRAVENOUS at 08:27

## 2019-10-22 RX ADMIN — SODIUM CHLORIDE, PRESERVATIVE FREE 10 ML: 5 INJECTION INTRAVENOUS at 08:28

## 2019-10-22 RX ADMIN — LEVOTHYROXINE SODIUM 75 MCG: 75 CAPSULE ORAL at 15:42

## 2019-10-22 RX ADMIN — IBUPROFEN 600 MG: 600 TABLET ORAL at 00:22

## 2019-10-22 RX ADMIN — CEFUROXIME AXETIL 250 MG: 250 TABLET ORAL at 20:24

## 2019-10-22 RX ADMIN — LISINOPRIL 10 MG: 10 TABLET ORAL at 22:04

## 2019-10-22 RX ADMIN — LISINOPRIL 5 MG: 5 TABLET ORAL at 08:27

## 2019-10-22 RX ADMIN — IBUPROFEN 600 MG: 600 TABLET ORAL at 05:56

## 2019-10-22 RX ADMIN — ACETAMINOPHEN 650 MG: 325 TABLET ORAL at 10:20

## 2019-10-22 NOTE — PROGRESS NOTES
[facility]  INPATIENT PROGRESS NOTE         [facility] 2G    10/22/2019      PATIENT IDENTIFICATION:   Name:  Brandy Garcia      MRN:  [id number]     39 y.o.  female             Reason for visit: Bradycardia, chest pain, palpitations.      SUBJECTIVE:   Feeling much improved today, no further epigastric pain.  Episode of palpitations and nausea with diaphoresis and headache after receiving a as needed dose of IV hydralazine for hypertension last evening.     OBJECTIVE:  Vitals:    10/21/19 2347 10/22/19 0332 10/22/19 0803 10/22/19 1220   BP: 145/87 157/86 127/72 146/97   BP Location: Right arm  Right arm Left arm   Patient Position: Lying  Lying Sitting   Pulse: 60 91 81 96   Resp: 18  16 16   Temp: 98.7 °F (37.1 °C)  98.7 °F (37.1 °C) 98.5 °F (36.9 °C)   TempSrc: Oral  Oral Oral   SpO2:       Weight:       Height:               Body mass index is 23.32 kg/m².    Intake/Output Summary (Last 24 hours) at 10/22/2019 1521  Last data filed at 10/22/2019 0359  Gross per 24 hour   Intake 1886 ml   Output --   Net 1886 ml       Telemetry: Personally reviewed, normal sinus rhythm, no arrhythmia     Exam:  General Appearance:   · well developed  · well nourished  Neck:  · thyroid not enlarged  · supple  Respiratory:  · no respiratory distress  · normal breath sounds  · no rales  Cardiovascular:  · no jugular venous distention  · regular rhythm  · apical impulse normal  · S1 normal, S2 normal  · no S3, no S4   · no murmur  · no rub, no thrill  · carotid pulses normal; no bruit  · pedal pulses normal  · lower extremity edema: none    Skin:   warm, dry      Allergies   Allergen Reactions   • Prednisone Other (See Comments)     Makes her crazy    • Brethaire [Terbutaline]    • Zithromax [Azithromycin] GI Intolerance   • Amoxicillin Rash     Received ceftriaxone 10/2019     Scheduled meds:         cefuroxime 250 mg Oral Q12H   levothyroxine sodium 75 mcg Oral Q AM    lisinopril 5 mg Oral Daily   ondansetron 4 mg Intravenous Once   pantoprazole 40 mg Oral QAM   sodium chloride 10 mL Intravenous Q12H     IV meds:                         Data Review:  Results from last 7 days   Lab Units 10/22/19  0508 10/21/19  1757 10/21/19  0424 10/20/19  0432   SODIUM mmol/L 139 142 142 145   BUN mg/dL 7 7 9 11   CREATININE mg/dL 0.64 0.64 0.58 0.67   GLUCOSE mg/dL 86 109* 106* 115*     Results from last 7 days   Lab Units 10/22/19  0508 10/21/19  0424 10/19/19  0700 10/18/19  0430 10/17/19  0537   WBC 10*3/mm3 6.50 6.99 6.89 10.51 19.14*   HEMOGLOBIN g/dL 10.4* 11.0* 9.7* 10.3* 10.2*         Results from last 7 days   Lab Units 10/21/19  0424 10/19/19  0700 10/18/19  0730 10/16/19  1136   ALT (SGPT) U/L 40* 29 40* 31   AST (SGOT) U/L 30 16 19 19     No results found for: DIGOXIN   Lab Results   Component Value Date    TSH 3.680 10/17/2019           Invalid input(s): LDLCALC    Estimated Creatinine Clearance: 123.2 mL/min (by C-G formula based on SCr of 0.64 mg/dL).        Imaging (last 24 hr):   I personally reviewed the most recent chest xray and other pertinent imaging studies.  Results for orders placed during the hospital encounter of 10/21/19   XR Chest 1 View    Narrative CR Chest 1 Vw    INDICATION:   Shortness of breath with bradycardia prior to arrival     COMPARISON:    10/19/2019    FINDINGS:  Single portable AP view(s) of the chest.  The heart and mediastinal contours are normal. The lungs are clear. No pneumothorax or pleural effusion.      Impression No acute cardiopulmonary findings.    Signer Name: Yoel Marques MD   Signed: 10/21/2019 4:50 AM   Workstation Name: [id number]    [facility]         Last ECHO:  Results for orders placed during the hospital encounter of 10/21/19   STAT Adult Transthoracic Echo Complete W/ Cont if Necessary Per Protocol    Narrative · Left atrial cavity size is borderline dilated.  · Left ventricular systolic function is  normal. Estimated EF = 65%.  · Mild mitral valve regurgitation is present  · Mild tricuspid valve regurgitation is present.  · Calculated right ventricular systolic pressure from tricuspid   regurgitation is 27 mmHg.            PROBLEM LIST:     GERD without esophagitis    Recent UTI    Symptomatic sinus bradycardia        ASSESSMENT/PLAN:  1.  Bradycardia:  Much improved  14-day Holter monitor at discharge  Follow-up with me in 6 to 8 weeks    2.  Chest pain:  Atypical, EKG shows no ischemic changes  Troponins negative  No further work-up necessary  Echocardiogram normal.    3.  Epigastric pain:  Recent pyelonephritis, history of GERD, history of cholecystectomy, CT benign  Work-up per primary team.    Okay for discharge from cardiac standpoint, no further work-up necessary from our standpoint.  Follow-up with me in 6 to 8 weeks.  Call our office at time of patient discharge, they will place a 14-day E patch.    Discussed with  patient's family      Yoel Cortez MD  10/22/2019    3:21 PM

## 2019-10-22 NOTE — PLAN OF CARE
"Problem: Patient Care Overview  Goal: Plan of Care Review  Outcome: Ongoing (interventions implemented as appropriate)   10/21/19 2330   Coping/Psychosocial   Plan of Care Reviewed With patient   Plan of Care Review   Progress improving   OTHER   Outcome Summary Pt. BP elevated upon beginning of shift 180's/100's and other elevations noted from earlier readings. Notified Prudence Aburto DNP and Hydralazine IV was ordered. Pt. BP improved to 156/90 s/p IV Hydralazine but pt. reported feeling \"palpitations and SOB\" HR was stable at 75bpm and O2 sats 96% on RA. Prudence Aburto DNP notified again and stat EKG and troponin ordered. Pt. has had a complaint of a headache that was treated with PO Tylenol. Pt. is resting comfortably in bed at this time. Will continue to monitor.       Problem: Cardiac Output Decreased (Adult)  Goal: Identify Related Risk Factors and Signs and Symptoms  Outcome: Ongoing (interventions implemented as appropriate)   10/21/19 1039 10/21/19 2330   Cardiac Output Decreased (Adult)   Related Risk Factors (Cardiac Output Decreased) heart rhythm altered --    Signs and Symptoms (Cardiac Output Decreased) --  shortness of breath;restlessness         "

## 2019-10-22 NOTE — PROGRESS NOTES
Discharge Planning Assessment  [facility]     Patient Name: Brandy Garcia  MRN: [id number]  Today's Date: 10/22/2019    Admit Date: 10/21/2019    Discharge Needs Assessment     Row Name 10/22/19 1525       Living Environment    Lives With  spouse;child(mandeep), dependent    Current Living Arrangements  home/apartment/condo    Primary Care Provided by  self    Provides Primary Care For  child(mandeep)    Family Caregiver if Needed  spouse    Quality of Family Relationships  supportive    Able to Return to Prior Arrangements  yes       Resource/Environmental Concerns    Resource/Environmental Concerns  none       Transition Planning    Patient/Family Anticipates Transition to  home with family    Patient/Family Anticipated Services at Transition  none    Transportation Anticipated  family or friend will provide       Discharge Needs Assessment    Readmission Within the Last 30 Days  no previous admission in last 30 days    Concerns to be Addressed  discharge planning    Equipment Currently Used at Home  none    Anticipated Changes Related to Illness  none    Equipment Needed After Discharge  none        Discharge Plan     Row Name 10/22/19 1526       Plan    Plan  Brandy Garcia lives with her spouse and dependent children in [facility].  Her insurance is [employer]. She denies using equipment or having home health. Case management will continue to follow for discharge needs.    Patient/Family in Agreement with Plan  yes    Final Discharge Disposition Code  01 - home or self-care        Destination      No service coordination in this encounter.      Durable Medical Equipment      No service coordination in this encounter.      Dialysis/Infusion      No service coordination in this encounter.      Home Medical Care      No service coordination in this encounter.      Therapy      No service coordination in this encounter.      Community Resources      No service coordination in this encounter.           Demographic Summary     Row Name 10/22/19 1524       General Information    Admission Type  observation    Arrived From  home    Referral Source  patient    Reason for Consult  discharge planning    Preferred Language  English       Contact Information    Permission Granted to Share Info With      Contact Information Obtained for          Functional Status     Row Name 10/22/19 1525       Functional Status    Usual Activity Tolerance  moderate    Current Activity Tolerance  moderate       Functional Status, IADL    Medications  independent    Meal Preparation  independent    Housekeeping  independent    Laundry  independent    Shopping  independent       Mental Status Summary    Recent Changes in Mental Status/Cognitive Functioning  no changes        Psychosocial    No documentation.       Abuse/Neglect    No documentation.       Legal    No documentation.       Substance Abuse    No documentation.       Patient Forms    No documentation.           JERMAINE Burger

## 2019-10-22 NOTE — PLAN OF CARE
Problem: Patient Care Overview  Goal: Plan of Care Review  Outcome: Ongoing (interventions implemented as appropriate)   10/22/19 1637   Coping/Psychosocial   Plan of Care Reviewed With patient   Plan of Care Review   Progress improving   OTHER   Outcome Summary patient did complains of a headache this am and thought it was caffeine related, No complaints of flank pain, was iker in the 50's a little in the AM but no complaints with any symptoms related to the bradycardia, patient did bring in home dose of thyroid medication and med verified by pharmacy, VSS, will continue to monitor        Problem: Cardiac Output Decreased (Adult)  Goal: Identify Related Risk Factors and Signs and Symptoms   10/22/19 1637   Cardiac Output Decreased (Adult)   Related Risk Factors (Cardiac Output Decreased) heart rhythm altered

## 2019-10-22 NOTE — PROGRESS NOTES
[facility] Medicine Services  PROGRESS NOTE    Patient Name: Brandy Garcia  : 1980  MRN: [id number]    Date of Admission: 10/21/2019  Primary Care Physician: Emily Solano APRN    Subjective   Subjective     CC:  Bradycardia, abd pain     HPI:  Overall feeling better today. Flank pain resolved. BP was elevated last night and states this was at a time when she was upset. She received IV hydralazine and had palpitations/HA. BP improved today.     Review of Systems  Gen- No fevers, chills  CV- No chest pain, palpitations  Resp- No cough, dyspnea  GI- No N/V/D, abd pain      Objective   Objective     Vital Signs:   Temp:  [98.5 °F (36.9 °C)-99 °F (37.2 °C)] 98.7 °F (37.1 °C)  Heart Rate:  [52-91] 81  Resp:  [16-18] 16  BP: (127-187)/() 127/72        Physical Exam:  Constitutional: No acute distress, awake, alert  HENT: NCAT, mucous membranes moist  Respiratory: Clear to auscultation bilaterally, respiratory effort normal   Cardiovascular: RRR, no murmurs, rubs, or gallops, palpable pedal pulses bilaterally  Gastrointestinal: Positive bowel sounds, soft, nontender, nondistended  Musculoskeletal: No bilateral ankle edema  Psychiatric: Appropriate affect, cooperative  Neurologic: Oriented x 3, strength symmetric in all extremities, Cranial Nerves grossly intact to confrontation, speech clear  Skin: No rashes    Results Reviewed:    Results from last 7 days   Lab Units 10/22/19  0508 10/21/19  0424 10/19/19  0700 10/18/19  0730  10/17/19  0447   WBC 10*3/mm3 6.50 6.99 6.89  --    < >  --    HEMOGLOBIN g/dL 10.4* 11.0* 9.7*  --    < >  --    HEMATOCRIT % 35.7 33.9* 30.4*  --    < >  --    PLATELETS 10*3/mm3 335 351 245  --    < >  --    PROCALCITONIN ng/mL  --  0.73*  --  3.89*  --  0.88*    < > = values in this interval not displayed.     Results from last 7 days   Lab Units 10/22/19  0508 10/21/19  2245 10/21/19  1757 10/21/19  0424  10/19/19  0700  10/18/19  0730   SODIUM  mmol/L 139  --  142 142   < > 140  --  141   POTASSIUM mmol/L 3.5  --  3.8 3.8   < > 4.3   < > 3.4*   CHLORIDE mmol/L 106  --  107 107   < > 108*  --  109*   CO2 mmol/L 22.0  --  24.0 26.0   < > 24.0  --  25.0   BUN mg/dL 7  --  7 9   < > 9  --  8   CREATININE mg/dL 0.64  --  0.64 0.58   < > 0.66  --  0.74   GLUCOSE mg/dL 86  --  109* 106*   < > 94  --  96   CALCIUM mg/dL 8.7  --  9.1 9.4   < > 8.4*  --  8.3*   ALT (SGPT) U/L  --   --   --  40*  --  29  --  40*   AST (SGOT) U/L  --   --   --  30  --  16  --  19   TROPONIN T ng/mL  --  <0.010  --  <0.010  --   --   --   --     < > = values in this interval not displayed.     Estimated Creatinine Clearance: 123.2 mL/min (by C-G formula based on SCr of 0.64 mg/dL).    Microbiology Results Abnormal     None          Imaging Results (last 24 hours)     ** No results found for the last 24 hours. **          Results for orders placed during the hospital encounter of 10/21/19   STAT Adult Transthoracic Echo Complete W/ Cont if Necessary Per Protocol    Narrative · Left atrial cavity size is borderline dilated.  · Left ventricular systolic function is normal. Estimated EF = 65%.  · Mild mitral valve regurgitation is present  · Mild tricuspid valve regurgitation is present.  · Calculated right ventricular systolic pressure from tricuspid   regurgitation is 27 mmHg.          I have reviewed the medications:  Scheduled Meds:  cefuroxime 250 mg Oral Q12H   levothyroxine 75 mcg Oral Q AM   lisinopril 5 mg Oral Daily   ondansetron 4 mg Intravenous Once   pantoprazole 40 mg Oral QAM   sodium chloride 10 mL Intravenous Q12H     Continuous Infusions:   PRN Meds:.•  acetaminophen **OR** acetaminophen **OR** acetaminophen  •  HYDROcodone-acetaminophen  •  ibuprofen  •  influenza vaccine  •  ondansetron **OR** ondansetron  •  simethicone  •  [COMPLETED] Insert peripheral IV **AND** sodium chloride  •  sodium chloride      Assessment/Plan   Assessment / Plan     Active Hospital Problems     Diagnosis  POA   • Symptomatic sinus bradycardia [R00.1]  Yes   • Recent UTI [N39.0]  Yes   • GERD without esophagitis [K21.9]  Yes      Resolved Hospital Problems   No resolved problems to display.        Brief Hospital Course to date:  Patient is a 39 y.o. female with past medical history of hypothyroidism, hypertension, nephrolithiasis, and recent admission from 9/16- 9/24 for obstructive uropathy and sepsis from E. coli with left pyelonephritis who presents to the ER less than 24 hours after discharge with complaints of epigastric abdominal discomfort/fullness, shortness of air, and slow heart rate.     Symptomatic bradycardia  - improving   --Unclear etiology, possibly related as a rare complication to Cefpodoxime.    --Consult cardiology; HR responds when she is awake, iker into the 40s with sleeping; outpatient 4-6 week follow up, Holter monitor and sleep study   --EKG without ischemic changes  --echo with normal EF; no significant valvular or wall motion abnormalities   --troponin negative       E. coli urinary tract infection, present on admission  Left flank pain   --started on rocephin on admission (10/21) will change to ceftin to complete 8 more days; first dose today to see how patient tolerates   --lactic acid normal  --CT abd pelvis essentially unchanged, nonobstructive stone noted.  - left flank pain overall improved; uncertain etiology      Epigastric pain  --unclear etiology and improving   -- lipase mildly elevated; no CT findings   --diarrhea likely related to recent mag citrate admin     Normocytic anemia  --increased from discharge; ferritin ok; B12 ok     htn  - on lisinipril 5 mg daily; intermittently elevated yesterday, however patient in pain and upset; received IV hydralazine and had side effects   - Will need outpatient follow up regarding BP  - Recommend avoiding PRN HTN medications unless she has elevated BP with symptoms      DVT prophylaxis: scds  CODE STATUS:   Code Status and  Medical Interventions:   Ordered at: 10/21/19 0622     Level Of Support Discussed With:    Patient     Code Status:    CPR     Medical Interventions (Level of Support Prior to Arrest):    Full         Electronically signed by Kimi Pires DO, 10/22/19, 9:29 AM.

## 2019-10-23 ENCOUNTER — APPOINTMENT (OUTPATIENT)
Dept: GENERAL RADIOLOGY | Facility: HOSPITAL | Age: 39
End: 2019-10-23

## 2019-10-23 VITALS
DIASTOLIC BLOOD PRESSURE: 94 MMHG | WEIGHT: 144.5 LBS | HEIGHT: 66 IN | RESPIRATION RATE: 18 BRPM | OXYGEN SATURATION: 97 % | SYSTOLIC BLOOD PRESSURE: 140 MMHG | BODY MASS INDEX: 23.22 KG/M2 | HEART RATE: 89 BPM | TEMPERATURE: 98.3 F

## 2019-10-23 PROCEDURE — G0378 HOSPITAL OBSERVATION PER HR: HCPCS

## 2019-10-23 PROCEDURE — 99217 PR OBSERVATION CARE DISCHARGE MANAGEMENT: CPT | Performed by: INTERNAL MEDICINE

## 2019-10-23 PROCEDURE — 74018 RADEX ABDOMEN 1 VIEW: CPT

## 2019-10-23 RX ORDER — HYDROXYZINE HYDROCHLORIDE 25 MG/1
12.5 TABLET, FILM COATED ORAL NIGHTLY PRN
Status: DISCONTINUED | OUTPATIENT
Start: 2019-10-23 | End: 2019-10-23 | Stop reason: HOSPADM

## 2019-10-23 RX ORDER — SUCRALFATE 1 G/1
1 TABLET ORAL
Status: DISCONTINUED | OUTPATIENT
Start: 2019-10-23 | End: 2019-10-23 | Stop reason: HOSPADM

## 2019-10-23 RX ORDER — SACCHAROMYCES BOULARDII 250 MG
250 CAPSULE ORAL 2 TIMES DAILY
Status: DISCONTINUED | OUTPATIENT
Start: 2019-10-23 | End: 2019-10-23 | Stop reason: HOSPADM

## 2019-10-23 RX ORDER — HYDROCODONE BITARTRATE AND ACETAMINOPHEN 5; 325 MG/1; MG/1
1 TABLET ORAL EVERY 6 HOURS PRN
Qty: 10 TABLET | Refills: 0 | Status: SHIPPED | OUTPATIENT
Start: 2019-10-23 | End: 2019-10-26

## 2019-10-23 RX ORDER — HYDROCHLOROTHIAZIDE 25 MG/1
12.5 TABLET ORAL ONCE
Status: DISCONTINUED | OUTPATIENT
Start: 2019-10-23 | End: 2019-10-23

## 2019-10-23 RX ORDER — SACCHAROMYCES BOULARDII 250 MG
250 CAPSULE ORAL 2 TIMES DAILY
Qty: 60 CAPSULE | Refills: 0 | Status: SHIPPED | OUTPATIENT
Start: 2019-10-23 | End: 2021-01-26

## 2019-10-23 RX ORDER — SIMETHICONE 80 MG
80 TABLET,CHEWABLE ORAL 4 TIMES DAILY PRN
Qty: 30 TABLET | Refills: 0 | Status: SHIPPED | OUTPATIENT
Start: 2019-10-23 | End: 2021-01-26

## 2019-10-23 RX ORDER — SUCRALFATE 1 G/1
1 TABLET ORAL
Qty: 120 TABLET | Refills: 0 | Status: SHIPPED | OUTPATIENT
Start: 2019-10-23 | End: 2021-01-26

## 2019-10-23 RX ORDER — CEFUROXIME AXETIL 250 MG/1
250 TABLET ORAL EVERY 12 HOURS SCHEDULED
Qty: 14 TABLET | Refills: 0 | Status: SHIPPED | OUTPATIENT
Start: 2019-10-23 | End: 2019-10-30

## 2019-10-23 RX ADMIN — LISINOPRIL 5 MG: 5 TABLET ORAL at 08:10

## 2019-10-23 RX ADMIN — SUCRALFATE 1 G: 1 TABLET ORAL at 16:39

## 2019-10-23 RX ADMIN — LEVOTHYROXINE SODIUM 75 MCG: 75 CAPSULE ORAL at 06:40

## 2019-10-23 RX ADMIN — SUCRALFATE 1 G: 1 TABLET ORAL at 11:30

## 2019-10-23 RX ADMIN — IBUPROFEN 600 MG: 600 TABLET ORAL at 10:35

## 2019-10-23 RX ADMIN — PANTOPRAZOLE SODIUM 40 MG: 40 TABLET, DELAYED RELEASE ORAL at 08:10

## 2019-10-23 RX ADMIN — SODIUM CHLORIDE, PRESERVATIVE FREE 10 ML: 5 INJECTION INTRAVENOUS at 08:12

## 2019-10-23 RX ADMIN — Medication 80 MG: at 00:32

## 2019-10-23 RX ADMIN — CEFUROXIME AXETIL 250 MG: 250 TABLET ORAL at 08:10

## 2019-10-23 RX ADMIN — HYDROCODONE BITARTRATE AND ACETAMINOPHEN 1 TABLET: 5; 325 TABLET ORAL at 10:35

## 2019-10-23 RX ADMIN — Medication 250 MG: at 09:48

## 2019-10-23 NOTE — NURSING NOTE
"Patients BP was elevated at 168/114 for 0000 vitals. Pt. Also reported feeling anxious and stressed, she states she \"doesn't want anything to happen\". Explained to patient that I would discuss her BP with Dr. Myles and her anxiety. Discussed with Dr. Myles about the patients stay here and issues with her BP from the night of 10/21. Explained we had given the pt. 10mg Hydralazine IV for her high BP's that evening but the patient reported feeling dizzy, flushed and having palpitations after the dose. Explained to Dr. Myles that earlier in the evening of 10/22 when the patients BP was high Prudence Aburto DNP was contacted and ordered the patient to have 10mg more of Lisinopril (patient takes 5mg in the morning-patient states her PCP told her she could switch between doses at needed). After this discussion with Dr. Myles she decided to order HCTZ for the BP and Hydroxyzine for the anxiety. Went to the patients room and explained to her the treatment options. Re-checked BP prior to giving the HCTZ and pts BP laying was 142/78 - patient discussed wishing to hold HCTZ at this time. Discussed I had the Hydroxyzine for anxiety but she also declined taking it at this time. Will continue to monitor.   "

## 2019-10-23 NOTE — PLAN OF CARE
Problem: Patient Care Overview  Goal: Plan of Care Review  Outcome: Ongoing (interventions implemented as appropriate)   10/23/19 0125   Coping/Psychosocial   Plan of Care Reviewed With patient   Plan of Care Review   Progress no change   OTHER   Outcome Summary Pt. BP has been elevated during the shift, 10mg Lisinopril given extra to her scheduled morning 5mg dose. Patient BP still elevated at 0000 (168/114). Patient complains that her HR is dropping low while she lays in bed but there has not been significant bradycardia noted on the monitor. Pt. reports anxiety. Spoke with Dr. Myles and will try other interventions for her BP -HCTZ ordered and for her anxiety -vistiril ordered.       Problem: Cardiac Output Decreased (Adult)  Goal: Identify Related Risk Factors and Signs and Symptoms  Outcome: Ongoing (interventions implemented as appropriate)   10/23/19 0125   Cardiac Output Decreased (Adult)   Related Risk Factors (Cardiac Output Decreased) heart rhythm altered   Signs and Symptoms (Cardiac Output Decreased) shortness of breath;restlessness

## 2019-10-23 NOTE — PLAN OF CARE
Problem: Patient Care Overview  Goal: Plan of Care Review  Outcome: Ongoing (interventions implemented as appropriate)   10/23/19 1407   Coping/Psychosocial   Plan of Care Reviewed With patient   Plan of Care Review   Progress no change   OTHER   Outcome Summary pt. has not been bradycardic throughout the day but has had one complaint of L sided flank pain provided pain medication X1 no needs at this time. Vitals have remained stable.        Problem: Cardiac Output Decreased (Adult)  Goal: Identify Related Risk Factors and Signs and Symptoms  Outcome: Ongoing (interventions implemented as appropriate)   10/23/19 1407   Cardiac Output Decreased (Adult)   Related Risk Factors (Cardiac Output Decreased) medication effects

## 2019-10-23 NOTE — DISCHARGE SUMMARY
[facility] Medicine Services  DISCHARGE SUMMARY    Patient Name: Brandy Garcia  : 1980  MRN: [id number]    Date of Admission: 10/21/2019  Date of Discharge:  10/23/19  Primary Care Physician: Emily Solano APRN    Consults     Date and Time Order Name Status Description    10/21/2019 0756 Inpatient Cardiology Consult Completed     10/17/2019 0634 Inpatient Urology Consult Completed           Hospital Course     Presenting Problem:   Bradycardia, sinus [R00.1]    Active Hospital Problems    Diagnosis  POA   • Symptomatic sinus bradycardia [R00.1]  Yes   • Recent UTI [N39.0]  Yes   • GERD without esophagitis [K21.9]  Yes      Resolved Hospital Problems   No resolved problems to display.          Hospital Course:  Patient is a 39 y.o. female with past medical history of hypothyroidism, hypertension, nephrolithiasis, and recent admission from -  for obstructive uropathy and sepsis from E. coli with left pyelonephritis who presents to the ER less than 24 hours after discharge with complaints of epigastric abdominal discomfort/fullness, shortness of air, and slow heart rate.     Symptomatic bradycardia  --EKG without ischemic changes; troponin negative   --echo with normal EF; no significant valvular or wall motion abnormalities   --Unclear etiology, possibly related as a rare complication to Cefpodoxime vs gas/distension of abd; bradycardia improved during admission  --Consult cardiology; HR responds when she is awake, iker into the 40s with sleeping; outpatient 6-8 week follow up, Holter monitor prior to discharge and sleep study      E. coli urinary tract infection, present on admission  Left flank pain   --started on rocephin on admission (10/21-10/22) will change to ceftin to complete 14 days total   --CT abd pelvis essentially unchanged, nonobstructive stone noted left kidney; no comment on calcified phlebolith noted on plain film. KUB obtained to and commented on  phlebolith. Discussed with urology by phone and planning for outpatient workup of this calcification.  - Flank pain improved during admission. Will need close urology follow up.      Epigastric pain  --unclear etiology and improving. Feels likely related to gastritis/gas and antibiotics. Improved today with addition of carafate and probiotic. Will continue on discharge. Recommend she take PM dosing of antibiotic earlier in the evening to help with gas/discomfort instead of close to bed time prior to laying down.   -- lipase mildly elevated; no CT findings - pancreas appeared normal     Normocytic anemia  - ferritin and B12 checked and ok. Levels remained stable. Cont outpatient monitoring      htn  - on lisinipril 5 mg daily; intermittently elevated, however, seems to be related to anxiety/abd discomfort. Will continue lisinopril 5 mg daily on discharge. Recommended continuing monitoring and follow up with PCP.       Discharge Follow Up Recommendations for labs/diagnostics:  PCP 1 week  Urology Dr. Enriquez 5-7 days   Cardiology Dr. Cortez 6-8 weeks     Day of Discharge     HPI:   Please see day of discharge progress note     Vital Signs:   Temp:  [97.9 °F (36.6 °C)-99.2 °F (37.3 °C)] 98.3 °F (36.8 °C)  Heart Rate:  [66-89] 89  Resp:  [16-18] 18  BP: (132-173)/() 140/94         Pertinent  and/or Most Recent Results     Results from last 7 days   Lab Units 10/22/19  0508 10/21/19  1757 10/21/19  0424 10/20/19  0432 10/19/19  0700 10/18/19  2301 10/18/19  0730 10/18/19  0430 10/17/19  0537 10/17/19  0319 10/16/19  2028   WBC 10*3/mm3 6.50  --  6.99  --  6.89  --   --  10.51 19.14* 19.50*  --    HEMOGLOBIN g/dL 10.4*  --  11.0*  --  9.7*  --   --  10.3* 10.2* 10.4*  --    HEMATOCRIT % 35.7  --  33.9*  --  30.4*  --   --  33.3* 31.9* 32.8*  --    PLATELETS 10*3/mm3 335  --  351  --  245  --   --  213 265 276  --    SODIUM mmol/L 139 142 142 145 140  --  141  --   --   --  139   POTASSIUM mmol/L 3.5 3.8 3.8 4.0 4.3 4.4  3.4*  --   --   --  4.0   CHLORIDE mmol/L 106 107 107 108* 108*  --  109*  --   --   --  106   CO2 mmol/L 22.0 24.0 26.0 26.0 24.0  --  25.0  --   --   --  21.0*   BUN mg/dL 7 7 9 11 9  --  8  --   --   --  13   CREATININE mg/dL 0.64 0.64 0.58 0.67 0.66  --  0.74  --   --   --  1.09*   GLUCOSE mg/dL 86 109* 106* 115* 94  --  96  --   --   --  93   CALCIUM mg/dL 8.7 9.1 9.4 8.7 8.4*  --  8.3*  --   --   --  8.8     Results from last 7 days   Lab Units 10/21/19  0424 10/19/19  0700 10/18/19  0730   BILIRUBIN mg/dL 0.2 0.2 0.4   ALK PHOS U/L 70 58 54   ALT (SGPT) U/L 40* 29 40*   AST (SGOT) U/L 30 16 19           Invalid input(s): TG, LDLCALC, LDLREALC  Results from last 7 days   Lab Units 10/21/19  2245 10/21/19  1757 10/21/19  0424 10/18/19  0730 10/17/19  0447 10/17/19  0319   TSH uIU/mL  --   --   --   --  3.680  --    CORTISOL mcg/dL  --   --   --   --  29.24  --    TROPONIN T ng/mL <0.010  --  <0.010  --   --   --    PROCALCITONIN ng/mL  --   --  0.73* 3.89* 0.88*  --    LACTATE mmol/L  --  0.9 0.7  --   --  2.0       Brief Urine Lab Results  (Last result in the past 365 days)      Color   Clarity   Blood   Leuk Est   Nitrite   Protein   CREAT   Urine HCG        10/21/19 0424 Yellow Clear Negative Small (1+) Negative Negative               Microbiology Results Abnormal     None          Imaging Results (all)     Procedure Component Value Units Date/Time    XR Abdomen KUB [[id number]] Collected:  10/23/19 0954     Updated:  10/23/19 1116    Narrative:       EXAMINATION: XR ABDOMEN KUB- 10/23/2019     INDICATION: kidney stones; R00.1-Bradycardia, unspecified; R10.10-Upper  abdominal pain, unspecified; R06.00-Dyspnea, unspecified      COMPARISON: NONE     FINDINGS: There is a nonspecific bowel gas pattern. There is no  hepatosplenomegaly. There are no pathological abdominal or pelvic  calcifications. There is an ovoid pelvic calcification consistent with a  calcified phlebolith.           Impression:       There is  an incidental calcified phlebolith in the pelvis.  There is no other evidence of calcification in the region of the left  kidney or ureter.     D:  10/23/2019  E:  10/23/2019     This report was finalized on 10/23/2019 11:12 AM by Dr. Tao Oviedo MD.       XR Chest 1 View [[id number]] Collected:  10/21/19 0450     Updated:  10/21/19 0452    Narrative:       CR Chest 1 Vw    INDICATION:   Shortness of breath with bradycardia prior to arrival     COMPARISON:    10/19/2019    FINDINGS:  Single portable AP view(s) of the chest.  The heart and mediastinal contours are normal. The lungs are clear. No pneumothorax or pleural effusion.      Impression:       No acute cardiopulmonary findings.    Signer Name: Yoel Marques MD   Signed: 10/21/2019 4:50 AM   Workstation Name: [id number]    [facility]    CT Abdomen Pelvis Without Contrast [[id number]] Collected:  10/21/19 0411     Updated:  10/21/19 0413    Narrative:       CT Abdomen Pelvis WO    INDICATION:   New onset of epigastric pain    TECHNIQUE:   CT of the abdomen and pelvis without IV contrast. Coronal and sagittal reconstructions were obtained.  Radiation dose reduction techniques included automated exposure control or exposure modulation based on body size. Count of known CT and cardiac nuc  med studies performed in previous 12 months: 1.     COMPARISON:   10/16/2019    FINDINGS:  Abdomen: The liver, spleen and pancreas are normal in size. There is a 2 to 3 mm nonobstructing left kidney stone in the lower pole. No hydronephrosis on either side. No retroperitoneal adenopathy. No distended bowel loops.    Pelvis: Normal appendix. No evidence of adenopathy, mass or fluid collection.      Impression:       No change from the previous CT scan of 10/16/2019. No evidence of developing hydronephrosis. No evidence of inflammatory bowel disease or obstruction.          Signer Name: Yoel Marques MD   Signed: 10/21/2019 4:11 AM   Workstation Name:  [id number]    [facility]                    Results for orders placed during the hospital encounter of 10/21/19   STAT Adult Transthoracic Echo Complete W/ Cont if Necessary Per Protocol    Narrative · Left atrial cavity size is borderline dilated.  · Left ventricular systolic function is normal. Estimated EF = 65%.  · Mild mitral valve regurgitation is present  · Mild tricuspid valve regurgitation is present.  · Calculated right ventricular systolic pressure from tricuspid   regurgitation is 27 mmHg.            Discharge Details        Discharge Medications      New Medications      Instructions Start Date   cefuroxime 250 MG tablet  Commonly known as:  CEFTIN   250 mg, Oral, Every 12 Hours Scheduled      HYDROcodone-acetaminophen 5-325 MG per tablet  Commonly known as:  NORCO   1 tablet, Oral, Every 6 Hours PRN      saccharomyces boulardii 250 MG capsule  Commonly known as:  FLORASTOR   250 mg, Oral, 2 Times Daily      simethicone 80 MG chewable tablet  Commonly known as:  MYLICON   80 mg, Oral, 4 Times Daily PRN      sucralfate 1 g tablet  Commonly known as:  CARAFATE   1 g, Oral, 4 Times Daily Before Meals & Nightly         Continue These Medications      Instructions Start Date   lisinopril 5 MG tablet  Commonly known as:  PRINIVIL,ZESTRIL   5 mg, Oral, Daily      omeprazole 20 MG capsule  Commonly known as:  priLOSEC   20 mg, Oral, Daily PRN      TIROSINT 75 MCG capsule  Generic drug:  levothyroxine sodium   75 mcg, Oral, Daily         Stop These Medications    cefpodoxime 200 MG tablet  Commonly known as:  VANTIN            Allergies   Allergen Reactions   • Prednisone Other (See Comments)     Makes her crazy    • Brethaire [Terbutaline]    • Zithromax [Azithromycin] GI Intolerance   • Amoxicillin Rash     Received ceftriaxone 10/2019         Discharge Disposition:  Home or Self Care    Diet:  Hospital:  Diet Order   Procedures   • Diet Regular     Discharge:   Diet Instructions      Diet: Regular      Discharge Diet:  Regular          Discharge Activity:   Activity Instructions     Activity as Tolerated              CODE STATUS:    Code Status and Medical Interventions:   Ordered at: 10/21/19 0622     Level Of Support Discussed With:    Patient     Code Status:    CPR     Medical Interventions (Level of Support Prior to Arrest):    Full         No future appointments.    Additional Instructions for the Follow-ups that You Need to Schedule     Discharge Follow-up with PCP   As directed       Currently Documented PCP:    Emily Solano APRN    PCP Phone Number:    [phone]     Follow Up Details:  PCP 1 week         Discharge Follow-up with Specified Provider: Urology Dr. Enriquez 5-7 days   As directed      To:  Urology Dr. Enriquez 5-7 days               Time Spent on Discharge:  40 minutes    Electronically signed by Kimi Pires DO, 10/23/19, 4:36 PM.

## 2019-10-23 NOTE — PROGRESS NOTES
[facility] Medicine Services  PROGRESS NOTE    Patient Name: Brandy Garcia  : 1980  MRN: [id number]    Date of Admission: 10/21/2019  Primary Care Physician: Emily Solano APRN    Subjective   Subjective     CC:  Bradycardia, abd discomfort     HPI:  Felt ok yesterday. Last night received ceftin and PM medications. 1.5 hrs after developed epigastric pain/fullness. When she would lay down she could feel heart beat and HR dropped into the 50s. This then made her anxious and caused elevated BP which improved. She did receive a higher dose of lisinopril yesterday. Left flank pain has resolved.     Review of Systems  Gen- No fevers, chills  CV- No chest pain, palpitations  Resp- No cough, dyspnea  GI- No N/V/D, + abd pain      Objective   Objective     Vital Signs:   Temp:  [97.6 °F (36.4 °C)-99.2 °F (37.3 °C)] 98.1 °F (36.7 °C)  Heart Rate:  [66-96] 80  Resp:  [16-18] 18  BP: (132-173)/() 132/98        Physical Exam:  Constitutional: No acute distress, awake, alert; appears tired   HENT: NCAT, mucous membranes moist  Respiratory: Clear to auscultation bilaterally, respiratory effort normal   Cardiovascular: RRR, no murmurs, rubs, or gallops, palpable pedal pulses bilaterally  Gastrointestinal: Positive bowel sounds, soft, nontender, nondistended  Musculoskeletal: No bilateral ankle edema  Psychiatric: Appropriate affect, cooperative  Neurologic: Oriented x 3, strength symmetric in all extremities, Cranial Nerves grossly intact to confrontation, speech clear  Skin: No rashes      Results Reviewed:    Results from last 7 days   Lab Units 10/22/19  0508 10/21/19  0424 10/19/19  0700 10/18/19  0730  10/17/19  0447   WBC 10*3/mm3 6.50 6.99 6.89  --    < >  --    HEMOGLOBIN g/dL 10.4* 11.0* 9.7*  --    < >  --    HEMATOCRIT % 35.7 33.9* 30.4*  --    < >  --    PLATELETS 10*3/mm3 335 351 245  --    < >  --    PROCALCITONIN ng/mL  --  0.73*  --  3.89*  --  0.88*    < > = values in  this interval not displayed.     Results from last 7 days   Lab Units 10/22/19  0508 10/21/19  2245 10/21/19  1757 10/21/19  0424  10/19/19  0700  10/18/19  0730   SODIUM mmol/L 139  --  142 142   < > 140  --  141   POTASSIUM mmol/L 3.5  --  3.8 3.8   < > 4.3   < > 3.4*   CHLORIDE mmol/L 106  --  107 107   < > 108*  --  109*   CO2 mmol/L 22.0  --  24.0 26.0   < > 24.0  --  25.0   BUN mg/dL 7  --  7 9   < > 9  --  8   CREATININE mg/dL 0.64  --  0.64 0.58   < > 0.66  --  0.74   GLUCOSE mg/dL 86  --  109* 106*   < > 94  --  96   CALCIUM mg/dL 8.7  --  9.1 9.4   < > 8.4*  --  8.3*   ALT (SGPT) U/L  --   --   --  40*  --  29  --  40*   AST (SGOT) U/L  --   --   --  30  --  16  --  19   TROPONIN T ng/mL  --  <0.010  --  <0.010  --   --   --   --     < > = values in this interval not displayed.     Estimated Creatinine Clearance: 123.2 mL/min (by C-G formula based on SCr of 0.64 mg/dL).    Microbiology Results Abnormal     None          Imaging Results (last 24 hours)     ** No results found for the last 24 hours. **          Results for orders placed during the hospital encounter of 10/21/19   STAT Adult Transthoracic Echo Complete W/ Cont if Necessary Per Protocol    Narrative · Left atrial cavity size is borderline dilated.  · Left ventricular systolic function is normal. Estimated EF = 65%.  · Mild mitral valve regurgitation is present  · Mild tricuspid valve regurgitation is present.  · Calculated right ventricular systolic pressure from tricuspid   regurgitation is 27 mmHg.          I have reviewed the medications:  Scheduled Meds:  cefuroxime 250 mg Oral Q12H   levothyroxine sodium 75 mcg Oral Q AM   lisinopril 5 mg Oral Daily   ondansetron 4 mg Intravenous Once   pantoprazole 40 mg Oral QAM   saccharomyces boulardii 250 mg Oral BID   sodium chloride 10 mL Intravenous Q12H   sucralfate 1 g Oral 4x Daily AC & at Bedtime     Continuous Infusions:   PRN Meds:.•  acetaminophen **OR** acetaminophen **OR**  acetaminophen  •  HYDROcodone-acetaminophen  •  hydrOXYzine  •  ibuprofen  •  influenza vaccine  •  ondansetron **OR** ondansetron  •  simethicone  •  [COMPLETED] Insert peripheral IV **AND** sodium chloride  •  sodium chloride      Assessment/Plan   Assessment / Plan     Active Hospital Problems    Diagnosis  POA   • Symptomatic sinus bradycardia [R00.1]  Yes   • Recent UTI [N39.0]  Yes   • GERD without esophagitis [K21.9]  Yes      Resolved Hospital Problems   No resolved problems to display.        Brief Hospital Course to date:  Patient is a 39 y.o. female with past medical history of hypothyroidism, hypertension, nephrolithiasis, and recent admission from 9/16- 9/24 for obstructive uropathy and sepsis from E. coli with left pyelonephritis who presents to the ER less than 24 hours after discharge with complaints of epigastric abdominal discomfort/fullness, shortness of air, and slow heart rate.     Symptomatic bradycardia  - improving   --Unclear etiology, possibly related as a rare complication to Cefpodoxime.    --Consult cardiology; HR responds when she is awake, iker into the 40s with sleeping; outpatient 6-8 week follow up, Holter monitor prior to discharge and sleep study   --EKG without ischemic changes  --echo with normal EF; no significant valvular or wall motion abnormalities   --troponin negative       E. coli urinary tract infection, present on admission  Left flank pain   --started on rocephin on admission (10/21-10/22) will change to ceftin to complete 8 more days   - left flank pain resolved   --lactic acid normal  --CT abd pelvis essentially unchanged, nonobstructive stone noted.  - left flank pain overall improved; uncertain etiology   - Difficulty with ceftin 10/22; will trial again today to assess symptoms      Epigastric pain  --unclear etiology and improving   -- lipase mildly elevated; no CT findings   -- symptoms likely related to antibiotics; feeling epigastric discomfort/fullness after  PM medications 10/22   - Trial carafate and probiotic; continue PPI   - obtain KUB      Normocytic anemia  --increased from discharge; ferritin ok; B12 ok      htn  - on lisinipril 5 mg daily; intermittently elevated, however, seems to be related to anxiety/abd discomfort   - Will need outpatient follow up regarding BP  - Recommend avoiding PRN HTN medications unless she has elevated BP with symptoms - significant difficulty with hydralazine side effects      DVT prophylaxis: scds    CODE STATUS:   Code Status and Medical Interventions:   Ordered at: 10/21/19 0622     Level Of Support Discussed With:    Patient     Code Status:    CPR     Medical Interventions (Level of Support Prior to Arrest):    Full         Electronically signed by Kimi Pires DO, 10/23/19, 8:49 AM.

## 2019-10-24 DIAGNOSIS — R00.2 PALPITATIONS: Primary | ICD-10-CM

## 2019-10-28 ENCOUNTER — APPOINTMENT (OUTPATIENT)
Dept: GENERAL RADIOLOGY | Facility: HOSPITAL | Age: 39
End: 2019-10-28

## 2019-10-28 ENCOUNTER — HOSPITAL ENCOUNTER (EMERGENCY)
Facility: HOSPITAL | Age: 39
Discharge: HOME | End: 2019-10-29
Attending: EMERGENCY MEDICINE | Admitting: EMERGENCY MEDICINE

## 2019-10-28 VITALS
WEIGHT: 264 LBS | SYSTOLIC BLOOD PRESSURE: 130 MMHG | OXYGEN SATURATION: 92 % | BODY MASS INDEX: 41.44 KG/M2 | TEMPERATURE: 98.3 F | HEIGHT: 67 IN | RESPIRATION RATE: 20 BRPM | DIASTOLIC BLOOD PRESSURE: 84 MMHG | HEART RATE: 88 BPM

## 2019-10-28 DIAGNOSIS — R79.89 LFT ELEVATION: ICD-10-CM

## 2019-10-28 DIAGNOSIS — K31.84 NONDIABETIC GASTROPARESIS: Primary | ICD-10-CM

## 2019-10-28 DIAGNOSIS — R74.8 ELEVATED LIPASE: ICD-10-CM

## 2019-10-28 LAB
ALBUMIN SERPL-MCNC: 4.3 G/DL (ref 3.5–5.2)
ALBUMIN/GLOB SERPL: 1.2 G/DL
ALP SERPL-CCNC: 67 U/L (ref 39–117)
ALT SERPL W P-5'-P-CCNC: 43 U/L (ref 1–33)
ANION GAP SERPL CALCULATED.3IONS-SCNC: 10 MMOL/L (ref 5–15)
AST SERPL-CCNC: 20 U/L (ref 1–32)
B-HCG UR QL: NEGATIVE
BASOPHILS # BLD AUTO: 0.06 10*3/MM3 (ref 0–0.2)
BASOPHILS NFR BLD AUTO: 0.7 % (ref 0–1.5)
BILIRUB SERPL-MCNC: 0.3 MG/DL (ref 0.2–1.2)
BILIRUB UR QL STRIP: NEGATIVE
BUN BLD-MCNC: 9 MG/DL (ref 6–20)
BUN/CREAT SERPL: 12.5 (ref 7–25)
CALCIUM SPEC-SCNC: 9.8 MG/DL (ref 8.6–10.5)
CHLORIDE SERPL-SCNC: 103 MMOL/L (ref 98–107)
CLARITY UR: CLEAR
CO2 SERPL-SCNC: 29 MMOL/L (ref 22–29)
COLOR UR: YELLOW
CREAT BLD-MCNC: 0.72 MG/DL (ref 0.57–1)
D-LACTATE SERPL-SCNC: 1.5 MMOL/L (ref 0.5–2)
DEPRECATED RDW RBC AUTO: 41.3 FL (ref 37–54)
EOSINOPHIL # BLD AUTO: 0.22 10*3/MM3 (ref 0–0.4)
EOSINOPHIL NFR BLD AUTO: 2.5 % (ref 0.3–6.2)
ERYTHROCYTE [DISTWIDTH] IN BLOOD BY AUTOMATED COUNT: 12.8 % (ref 12.3–15.4)
ERYTHROCYTE [SEDIMENTATION RATE] IN BLOOD: 28 MM/HR (ref 0–20)
GFR SERPL CREATININE-BSD FRML MDRD: 91 ML/MIN/1.73
GLOBULIN UR ELPH-MCNC: 3.6 GM/DL
GLUCOSE BLD-MCNC: 114 MG/DL (ref 65–99)
GLUCOSE UR STRIP-MCNC: NEGATIVE MG/DL
HCT VFR BLD AUTO: 38.5 % (ref 34–46.6)
HGB BLD-MCNC: 12.1 G/DL (ref 12–15.9)
HGB UR QL STRIP.AUTO: NEGATIVE
IMM GRANULOCYTES # BLD AUTO: 0.02 10*3/MM3 (ref 0–0.05)
IMM GRANULOCYTES NFR BLD AUTO: 0.2 % (ref 0–0.5)
INTERNAL NEGATIVE CONTROL: NEGATIVE
INTERNAL POSITIVE CONTROL: POSITIVE
KETONES UR QL STRIP: NEGATIVE
LEUKOCYTE ESTERASE UR QL STRIP.AUTO: NEGATIVE
LIPASE SERPL-CCNC: 90 U/L (ref 13–60)
LYMPHOCYTES # BLD AUTO: 2.59 10*3/MM3 (ref 0.7–3.1)
LYMPHOCYTES NFR BLD AUTO: 29.1 % (ref 19.6–45.3)
Lab: NORMAL
MAGNESIUM SERPL-MCNC: 2.1 MG/DL (ref 1.6–2.6)
MCH RBC QN AUTO: 27.9 PG (ref 26.6–33)
MCHC RBC AUTO-ENTMCNC: 31.4 G/DL (ref 31.5–35.7)
MCV RBC AUTO: 88.7 FL (ref 79–97)
MONOCYTES # BLD AUTO: 0.63 10*3/MM3 (ref 0.1–0.9)
MONOCYTES NFR BLD AUTO: 7.1 % (ref 5–12)
NEUTROPHILS # BLD AUTO: 5.39 10*3/MM3 (ref 1.7–7)
NEUTROPHILS NFR BLD AUTO: 60.4 % (ref 42.7–76)
NITRITE UR QL STRIP: NEGATIVE
NRBC BLD AUTO-RTO: 0 /100 WBC (ref 0–0.2)
PH UR STRIP.AUTO: 7.5 [PH] (ref 5–8)
PLATELET # BLD AUTO: 433 10*3/MM3 (ref 140–450)
PMV BLD AUTO: 9.9 FL (ref 6–12)
POTASSIUM BLD-SCNC: 3.7 MMOL/L (ref 3.5–5.2)
PROCALCITONIN SERPL-MCNC: 0.03 NG/ML (ref 0.1–0.25)
PROT SERPL-MCNC: 7.9 G/DL (ref 6–8.5)
PROT UR QL STRIP: NEGATIVE
RBC # BLD AUTO: 4.34 10*6/MM3 (ref 3.77–5.28)
SODIUM BLD-SCNC: 142 MMOL/L (ref 136–145)
SP GR UR STRIP: 1.01 (ref 1–1.03)
UROBILINOGEN UR QL STRIP: NORMAL
WBC NRBC COR # BLD: 8.91 10*3/MM3 (ref 3.4–10.8)

## 2019-10-28 PROCEDURE — 81025 URINE PREGNANCY TEST: CPT | Performed by: EMERGENCY MEDICINE

## 2019-10-28 PROCEDURE — 85652 RBC SED RATE AUTOMATED: CPT | Performed by: EMERGENCY MEDICINE

## 2019-10-28 PROCEDURE — 85025 COMPLETE CBC W/AUTO DIFF WBC: CPT | Performed by: EMERGENCY MEDICINE

## 2019-10-28 PROCEDURE — 83690 ASSAY OF LIPASE: CPT | Performed by: EMERGENCY MEDICINE

## 2019-10-28 PROCEDURE — 81003 URINALYSIS AUTO W/O SCOPE: CPT | Performed by: EMERGENCY MEDICINE

## 2019-10-28 PROCEDURE — 83735 ASSAY OF MAGNESIUM: CPT | Performed by: EMERGENCY MEDICINE

## 2019-10-28 PROCEDURE — 84145 PROCALCITONIN (PCT): CPT | Performed by: EMERGENCY MEDICINE

## 2019-10-28 PROCEDURE — 74019 RADEX ABDOMEN 2 VIEWS: CPT

## 2019-10-28 PROCEDURE — 83605 ASSAY OF LACTIC ACID: CPT | Performed by: EMERGENCY MEDICINE

## 2019-10-28 PROCEDURE — 80053 COMPREHEN METABOLIC PANEL: CPT | Performed by: EMERGENCY MEDICINE

## 2019-10-28 PROCEDURE — 99284 EMERGENCY DEPT VISIT MOD MDM: CPT

## 2019-10-28 RX ORDER — SODIUM CHLORIDE 0.9 % (FLUSH) 0.9 %
10 SYRINGE (ML) INJECTION AS NEEDED
Status: DISCONTINUED | OUTPATIENT
Start: 2019-10-28 | End: 2019-10-29 | Stop reason: HOSPADM

## 2019-10-28 RX ORDER — HYDROCODONE BITARTRATE AND ACETAMINOPHEN 5; 325 MG/1; MG/1
1 TABLET ORAL EVERY 6 HOURS PRN
COMMUNITY
End: 2021-01-26

## 2019-10-28 RX ADMIN — SODIUM CHLORIDE, POTASSIUM CHLORIDE, SODIUM LACTATE AND CALCIUM CHLORIDE 1000 ML: 600; 310; 30; 20 INJECTION, SOLUTION INTRAVENOUS at 23:28

## 2020-04-22 ENCOUNTER — TRANSCRIBE ORDERS (OUTPATIENT)
Dept: ADMINISTRATIVE | Facility: HOSPITAL | Age: 40
End: 2020-04-22

## 2020-04-22 DIAGNOSIS — R10.12 LUQ PAIN: Primary | ICD-10-CM

## 2020-04-24 ENCOUNTER — HOSPITAL ENCOUNTER (OUTPATIENT)
Dept: ULTRASOUND IMAGING | Facility: HOSPITAL | Age: 40
End: 2020-04-24

## 2020-05-01 ENCOUNTER — HOSPITAL ENCOUNTER (OUTPATIENT)
Dept: ULTRASOUND IMAGING | Facility: HOSPITAL | Age: 40
Discharge: HOME | End: 2020-05-01
Admitting: INTERNAL MEDICINE

## 2020-05-01 DIAGNOSIS — R10.12 LUQ PAIN: ICD-10-CM

## 2020-05-01 PROCEDURE — 76700 US EXAM ABDOM COMPLETE: CPT

## 2020-12-23 ENCOUNTER — IMMUNIZATION (OUTPATIENT)
Dept: VACCINE CLINIC | Facility: HOSPITAL | Age: 40
End: 2020-12-23

## 2020-12-23 PROCEDURE — 0001A: CPT | Performed by: INTERNAL MEDICINE

## 2020-12-23 PROCEDURE — 91300 HC SARSCOV02 VAC 30MCG/0.3ML IM: CPT | Performed by: INTERNAL MEDICINE

## 2021-01-13 ENCOUNTER — APPOINTMENT (OUTPATIENT)
Dept: VACCINE CLINIC | Facility: HOSPITAL | Age: 41
End: 2021-01-13

## 2021-01-26 ENCOUNTER — OFFICE VISIT (OUTPATIENT)
Dept: OBSTETRICS AND GYNECOLOGY | Facility: CLINIC | Age: 41
End: 2021-01-26

## 2021-01-26 VITALS
HEIGHT: 67 IN | SYSTOLIC BLOOD PRESSURE: 139 MMHG | DIASTOLIC BLOOD PRESSURE: 90 MMHG | BODY MASS INDEX: 42.69 KG/M2 | WEIGHT: 272 LBS

## 2021-01-26 DIAGNOSIS — N92.0 MENORRHAGIA WITH REGULAR CYCLE: ICD-10-CM

## 2021-01-26 DIAGNOSIS — E66.01 MORBID OBESITY WITH BMI OF 40.0-44.9, ADULT (HCC): ICD-10-CM

## 2021-01-26 DIAGNOSIS — Z01.419 WOMEN'S ANNUAL ROUTINE GYNECOLOGICAL EXAMINATION: Primary | ICD-10-CM

## 2021-01-26 DIAGNOSIS — N94.6 DYSMENORRHEA: ICD-10-CM

## 2021-01-26 PROCEDURE — 99395 PREV VISIT EST AGE 18-39: CPT | Performed by: NURSE PRACTITIONER

## 2021-01-26 NOTE — PROGRESS NOTES
GYN Annual Exam     CC - Here for annual exam.        HPI  Brandy Garcia is a 40 y.o. female, , who presents for annual well woman exam. Patient's last menstrual period was 2021..  Periods are regular every 25-35 days, lasting 7 days. .  Dysmenorrhea:severe, occurring first 1-2 days of flow.  Patient reports problems with: none.  There were no changes to her medical or surgical history since her last visit.. Partner Status: Marital Status: .  New Partners since last visit: no.  Desires STD Screening: no.    She reports having severe headaches with severe dysmenorrhea and increased anxiety around the time of her menstrual cycle. She states she has a heavier flow with many clots. She has a h/x of cysts. She reports at the end of her last cycle she had severe abdominal pain, she states the pain felt like when she has had a ovarian cyst.     Additional OB/GYN History   Current contraception: contraceptive methods: Vasectomy   Desires to: do not start contraception  Last Pap :   Last Completed Pap Smear       Status Date      PAP SMEAR Done 2019 Negative     Patient has more history with this topic...        History of abnormal Pap smear: yes - ASC in 2018, Colposcopy and LEEP when she was 19  Family history of uterine, colon, breast, or ovarian cancer: no  Performs monthly Self-Breast Exam: yes  Exercises Regularly:no  Feelings of Anxiety or Depression: yes - Anxiety, with minor depression  Tobacco Usage?: No   OB History        3    Para   3    Term   2       1    AB   0    Living   3       SAB        TAB        Ectopic        Molar        Multiple        Live Births                    Health Maintenance   Topic Date Due   • Annual Gynecologic Pelvic and Breast Exam  1980   • ANNUAL PHYSICAL  1984   • TDAP/TD VACCINES (1 - Tdap) 2000   • HEPATITIS C SCREENING  2017   • INFLUENZA VACCINE  2020   • PAP SMEAR  2022   • Pneumococcal Vaccine 0-64  " Aged Out   • MENINGOCOCCAL VACCINE  Aged Out       The additional following portions of the patient's history were reviewed and updated as appropriate: allergies, current medications, past family history, past medical history, past social history, past surgical history and problem list.    Review of Systems   Constitutional: Negative.    HENT: Negative.    Eyes: Negative.    Respiratory: Negative.    Cardiovascular: Negative.    Gastrointestinal: Negative.    Endocrine: Negative.    Genitourinary: Positive for menstrual problem.   Musculoskeletal: Negative.    Skin: Negative.    Allergic/Immunologic: Negative.    Neurological: Negative.    Hematological: Negative.    Psychiatric/Behavioral: Negative.      All other systems reviewed and are negative.     I have reviewed and agree with the HPI, ROS, and historical information as entered above. Haley Thompson, APRN    Objective   /90   Ht 170.2 cm (67\")   Wt 123 kg (272 lb)   LMP 01/17/2021   Breastfeeding No   BMI 42.60 kg/m²     Physical Exam  Vitals signs and nursing note reviewed. Exam conducted with a chaperone present.   Constitutional:       Appearance: She is well-developed. She is obese.   HENT:      Head: Normocephalic and atraumatic.   Neck:      Musculoskeletal: Normal range of motion. No muscular tenderness.      Thyroid: No thyroid mass or thyromegaly.   Cardiovascular:      Rate and Rhythm: Normal rate and regular rhythm.      Heart sounds: No murmur.   Pulmonary:      Effort: Pulmonary effort is normal. No retractions.      Breath sounds: Normal breath sounds. No wheezing, rhonchi or rales.   Chest:      Chest wall: No mass or tenderness.      Breasts:         Right: Normal. No mass, nipple discharge, skin change or tenderness.         Left: Normal. No mass, nipple discharge, skin change or tenderness.   Abdominal:      General: Bowel sounds are normal.      Palpations: Abdomen is soft. Abdomen is not rigid. There is no mass.      Tenderness: " There is no abdominal tenderness. There is no guarding.      Hernia: No hernia is present. There is no hernia in the left inguinal area.   Genitourinary:     Labia:         Right: No rash, tenderness or lesion.         Left: No rash, tenderness or lesion.       Vagina: Normal. No vaginal discharge or lesions.      Cervix: No cervical motion tenderness, discharge, lesion or cervical bleeding.      Uterus: Normal. Not enlarged, not fixed and not tender.       Adnexa:         Right: No mass or tenderness.          Left: No mass or tenderness.        Rectum: No external hemorrhoid.   Neurological:      Mental Status: She is alert and oriented to person, place, and time.   Psychiatric:         Behavior: Behavior normal.            Assessment and Plan    Problem List Items Addressed This Visit     None      Visit Diagnoses     Women's annual routine gynecological examination    -  Primary    Relevant Orders    Pap IG, HPV-hr    Morbid obesity with BMI of 40.0-44.9, adult (CMS/HCC)        Menorrhagia with regular cycle        Relevant Orders    US Non-ob Transvaginal    Dysmenorrhea        Relevant Orders    US Non-ob Transvaginal          1. GYN annual well woman exam.   2. Reviewed monthly self breast exams.  Instructed to call with lumps, pain, or breast discharge.    3. Reviewed BMI and weight loss as preventative health measures.   4. Reccommended Flu Vaccine in Fall of each year.  5. Other: Will have pt RTC for pelvic U/S to eval menorrhagia and dysmenorrhea. Pt having labs with PCP next week. Briefly discussed options for treatment. Pt with HTN, so not a good candidate for estrogen containing products.       Haley Thompson, SHYLA  01/26/2021

## 2021-01-29 ENCOUNTER — APPOINTMENT (OUTPATIENT)
Dept: VACCINE CLINIC | Facility: HOSPITAL | Age: 41
End: 2021-01-29

## 2021-02-08 ENCOUNTER — IMMUNIZATION (OUTPATIENT)
Dept: VACCINE CLINIC | Facility: HOSPITAL | Age: 41
End: 2021-02-08

## 2021-02-08 PROCEDURE — 91300 HC SARSCOV02 VAC 30MCG/0.3ML IM: CPT | Performed by: INTERNAL MEDICINE

## 2021-02-08 PROCEDURE — 0002A: CPT | Performed by: INTERNAL MEDICINE

## 2021-02-11 DIAGNOSIS — Z01.419 WOMEN'S ANNUAL ROUTINE GYNECOLOGICAL EXAMINATION: ICD-10-CM

## 2021-12-10 ENCOUNTER — IMMUNIZATION (OUTPATIENT)
Dept: VACCINE CLINIC | Facility: HOSPITAL | Age: 41
End: 2021-12-10

## 2021-12-10 DIAGNOSIS — Z23 NEED FOR VACCINATION: Primary | ICD-10-CM

## 2021-12-10 PROCEDURE — 0064A HC ADM SARSCOV2 50MCG/0.25ML BOOSTER: CPT | Performed by: INTERNAL MEDICINE

## 2021-12-10 PROCEDURE — 91306 HC SARSCOV2 VAC 50MCG/0.25ML IM: CPT | Performed by: INTERNAL MEDICINE

## 2022-01-03 ENCOUNTER — TRANSCRIBE ORDERS (OUTPATIENT)
Dept: ADMINISTRATIVE | Facility: HOSPITAL | Age: 42
End: 2022-01-03

## 2022-01-03 DIAGNOSIS — Z12.31 VISIT FOR SCREENING MAMMOGRAM: Primary | ICD-10-CM

## 2022-02-12 ENCOUNTER — HOSPITAL ENCOUNTER (OUTPATIENT)
Dept: MAMMOGRAPHY | Facility: HOSPITAL | Age: 42
Discharge: HOME | End: 2022-02-12
Admitting: INTERNAL MEDICINE

## 2022-02-12 DIAGNOSIS — Z12.31 VISIT FOR SCREENING MAMMOGRAM: ICD-10-CM

## 2022-02-12 PROCEDURE — 77067 SCR MAMMO BI INCL CAD: CPT

## 2022-02-12 PROCEDURE — 77067 SCR MAMMO BI INCL CAD: CPT | Performed by: RADIOLOGY

## 2022-02-12 PROCEDURE — 77063 BREAST TOMOSYNTHESIS BI: CPT | Performed by: RADIOLOGY

## 2022-02-12 PROCEDURE — 77063 BREAST TOMOSYNTHESIS BI: CPT

## 2022-03-22 ENCOUNTER — TRANSCRIBE ORDERS (OUTPATIENT)
Dept: MAMMOGRAPHY | Facility: HOSPITAL | Age: 42
End: 2022-03-22

## 2022-03-22 ENCOUNTER — HOSPITAL ENCOUNTER (OUTPATIENT)
Dept: MAMMOGRAPHY | Facility: HOSPITAL | Age: 42
Discharge: HOME | End: 2022-03-22

## 2022-03-22 ENCOUNTER — HOSPITAL ENCOUNTER (OUTPATIENT)
Dept: ULTRASOUND IMAGING | Facility: HOSPITAL | Age: 42
Discharge: HOME | End: 2022-03-22

## 2022-03-22 DIAGNOSIS — R92.8 ABNORMAL MAMMOGRAM: ICD-10-CM

## 2022-03-22 DIAGNOSIS — R92.8 ABNORMAL MAMMOGRAM: Primary | ICD-10-CM

## 2022-03-22 PROCEDURE — 76642 ULTRASOUND BREAST LIMITED: CPT | Performed by: RADIOLOGY

## 2022-03-22 PROCEDURE — 77061 BREAST TOMOSYNTHESIS UNI: CPT | Performed by: RADIOLOGY

## 2022-03-22 PROCEDURE — 77065 DX MAMMO INCL CAD UNI: CPT | Performed by: RADIOLOGY

## 2022-03-22 PROCEDURE — 77065 DX MAMMO INCL CAD UNI: CPT

## 2022-03-22 PROCEDURE — G0279 TOMOSYNTHESIS, MAMMO: HCPCS

## 2022-03-22 PROCEDURE — 76642 ULTRASOUND BREAST LIMITED: CPT

## 2022-03-30 ENCOUNTER — HOSPITAL ENCOUNTER (OUTPATIENT)
Dept: MAMMOGRAPHY | Facility: HOSPITAL | Age: 42
Discharge: HOME | End: 2022-03-30

## 2022-03-30 DIAGNOSIS — R92.8 ABNORMAL MAMMOGRAM: ICD-10-CM

## 2022-03-30 PROCEDURE — 77065 DX MAMMO INCL CAD UNI: CPT | Performed by: RADIOLOGY

## 2022-03-30 PROCEDURE — A4648 IMPLANTABLE TISSUE MARKER: HCPCS

## 2022-03-30 PROCEDURE — 19081 BX BREAST 1ST LESION STRTCTC: CPT | Performed by: RADIOLOGY

## 2022-03-30 PROCEDURE — 88305 TISSUE EXAM BY PATHOLOGIST: CPT | Performed by: INTERNAL MEDICINE

## 2022-03-30 PROCEDURE — 0 LIDOCAINE 1 % SOLUTION: Performed by: INTERNAL MEDICINE

## 2022-03-30 RX ORDER — LIDOCAINE HYDROCHLORIDE 10 MG/ML
10 INJECTION, SOLUTION INFILTRATION; PERINEURAL ONCE
Status: SHIPPED | OUTPATIENT
Start: 2022-03-30

## 2022-03-30 RX ORDER — LIDOCAINE HYDROCHLORIDE 10 MG/ML
15 INJECTION, SOLUTION INFILTRATION; PERINEURAL ONCE
Status: COMPLETED | OUTPATIENT
Start: 2022-03-30 | End: 2022-03-30

## 2022-03-30 RX ADMIN — LIDOCAINE HYDROCHLORIDE 13 ML: 10 INJECTION, SOLUTION INFILTRATION; PERINEURAL at 14:13

## 2022-03-30 NOTE — PROGRESS NOTES
Alert and orientated. Denies discomfort. No active bleeding. Steri-strips not visualized, gauze dressing intact. Ice packs given. Verbalizes and demonstrates understanding of post-care instructions, written copy given.

## 2022-04-01 ENCOUNTER — TRANSCRIBE ORDERS (OUTPATIENT)
Dept: ADMINISTRATIVE | Facility: HOSPITAL | Age: 42
End: 2022-04-01

## 2022-04-01 ENCOUNTER — TELEPHONE (OUTPATIENT)
Dept: MAMMOGRAPHY | Facility: HOSPITAL | Age: 42
End: 2022-04-01

## 2022-04-01 DIAGNOSIS — R92.8 ABNORMAL MAMMOGRAM: Primary | ICD-10-CM

## 2022-04-01 LAB
CYTO UR: NORMAL
LAB AP CASE REPORT: NORMAL
LAB AP CLINICAL INFORMATION: NORMAL
LAB AP DIAGNOSIS COMMENT: NORMAL
PATH REPORT.FINAL DX SPEC: NORMAL
PATH REPORT.GROSS SPEC: NORMAL

## 2022-04-01 NOTE — TELEPHONE ENCOUNTER
Patient notified of pathology results and recommendation. Verbalizes understanding. Denies discomfort. Denies signs and symptoms of infection. Questions answered, verbalized understanding.

## 2022-08-24 NOTE — PLAN OF CARE
-- DO NOT REPLY / DO NOT REPLY ALL --  -- Message is from Engagement Center Operations ([facility]) --    General Patient Message  Patient is returning a call this is the 3rd call back.    Caller Information       Type Contact Phone/Fax    08/24/2022 10:31 AM CDT Phone (Incoming) Philip Campos (Self) [phone] (H)        Alternative phone number: none    Can a detailed message be left? No    Message Turnaround:     Did the caller agree that this message can wait until the office reopens in the morning? YES - The Message Can Wait - Send a message to the provider's clinical support pool     [place]:    Please give this turnaround time to the caller:   \"This message will be sent to [state Provider's name]. The clinical team will fulfill your request as soon as they review your message.\"                 Problem: Patient Care Overview  Goal: Plan of Care Review  Outcome: Ongoing (interventions implemented as appropriate)   10/20/19 1105   Coping/Psychosocial   Plan of Care Reviewed With patient   Plan of Care Review   Progress improving   OTHER   Outcome Summary VSS; patient rested well; ambulating and voiding; discharge instructions given.     Goal: Individualization and Mutuality  Outcome: Ongoing (interventions implemented as appropriate)    Goal: Discharge Needs Assessment  Outcome: Ongoing (interventions implemented as appropriate)    Goal: Interprofessional Rounds/Family Conf  Outcome: Ongoing (interventions implemented as appropriate)      Problem: Skin Injury Risk (Adult)  Goal: Skin Health and Integrity  Outcome: Ongoing (interventions implemented as appropriate)      Problem: Sepsis/Septic Shock (Adult)  Goal: Signs and Symptoms of Listed Potential Problems Will be Absent, Minimized or Managed (Sepsis/Septic Shock)  Outcome: Ongoing (interventions implemented as appropriate)

## 2022-10-27 ENCOUNTER — APPOINTMENT (OUTPATIENT)
Dept: MAMMOGRAPHY | Facility: HOSPITAL | Age: 42
End: 2022-10-27

## 2023-01-19 DIAGNOSIS — N92.0 MENORRHAGIA WITH REGULAR CYCLE: Primary | ICD-10-CM

## 2023-01-23 ENCOUNTER — OFFICE VISIT (OUTPATIENT)
Dept: OBSTETRICS AND GYNECOLOGY | Facility: CLINIC | Age: 43
End: 2023-01-23
Payer: COMMERCIAL

## 2023-01-23 VITALS
WEIGHT: 260.8 LBS | HEIGHT: 67 IN | SYSTOLIC BLOOD PRESSURE: 128 MMHG | BODY MASS INDEX: 40.93 KG/M2 | DIASTOLIC BLOOD PRESSURE: 90 MMHG

## 2023-01-23 DIAGNOSIS — Z01.419 WOMEN'S ANNUAL ROUTINE GYNECOLOGICAL EXAMINATION: Primary | ICD-10-CM

## 2023-01-23 DIAGNOSIS — Z12.31 BREAST CANCER SCREENING BY MAMMOGRAM: ICD-10-CM

## 2023-01-23 DIAGNOSIS — N92.0 MENORRHAGIA WITH REGULAR CYCLE: ICD-10-CM

## 2023-01-23 PROCEDURE — 99396 PREV VISIT EST AGE 40-64: CPT | Performed by: OBSTETRICS & GYNECOLOGY

## 2023-01-23 PROCEDURE — 99213 OFFICE O/P EST LOW 20 MIN: CPT | Performed by: OBSTETRICS & GYNECOLOGY

## 2023-01-23 RX ORDER — SEMAGLUTIDE 1.34 MG/ML
INJECTION, SOLUTION SUBCUTANEOUS
COMMUNITY
Start: 2022-11-23 | End: 2023-03-07

## 2023-01-23 RX ORDER — TRANEXAMIC ACID 650 MG/1
TABLET ORAL
Qty: 30 TABLET | Refills: 12 | Status: SHIPPED | OUTPATIENT
Start: 2023-01-23 | End: 2023-03-07

## 2023-01-23 NOTE — PROGRESS NOTES
Gynecologic Annual Exam Note          GYN Annual Exam     Gynecologic Exam        Subjective     HPI  Brandy Garcia is a 42 y.o. female, , who presents for annual well woman exam as a established patient of practice who is new to me . Patient's last menstrual period was 2023 (approximate)..  Patient reports problems with: heavy bleeding on day 3-5. The patient uses 1 of tampons/pads per hour, irritability and dysmenorrhea.  Her periods occur every 25-35 days.  Her menses are typically every last 7 to 10 days, however last month it lasted up to 14 days.  The flow is heavy saturating a pad/tampon every 1 hour with golf ball sized clots. This heavy bleeding lasts for 2-3 days of her period... She reports dysmenorrhea is severe, occurring days 3-5. Patient states her cramping causes her to nauseous. She takes ibuprofen and tylenol for relief. Also complains of severe irritability 1-2 days before her period. States that she spoke with Dr. Granados about her menorrhagia at her annual in  but is has worsened since then. Partner Status: Marital Status: . She is is sexually active. She has not had new partners.. STD testing recommendations have been explained to the patient and she does not desire STD testing. There were no changes to her medical or surgical history since her last visit..       Additional OB/GYN History   Current contraception: contraceptive methods: Vasectomy   Desires to: do not start contraception      US done today: Yes.  Findings showed Anteverted uterus uniform endometrium measuring 11.7 mm.  No fibroids or polyps seen.  Small incidental left ovarian follicle noted.  No free fluid noted..  I have personally evaluated the U/S and agree with the findings. Lorna Wright MD    Last Pap : 21. Result: negative. HPV: negative.   Last Completed Pap Smear          PAP SMEAR (Every 3 Years) Next due on 2021  Pap IG, HPV-hr    2019  Done -  Negative    2018  Done - negative              History of abnormal Pap smear: yes - in , LEEP  Family history of uterine, colon, breast, or ovarian cancer: no  Performs monthly Self-Breast Exam: yes  Last mammogram: 3/22/22. Done at .    Last Completed Mammogram     This patient has no relevant Health Maintenance data.          History of abnormal mammogram: yes -  breast biopsy on left    Colonoscopy: has never had a colonoscopy.  Exercises Regularly: yes  Feelings of Anxiety or Depression: yes - feels it is uncontrolled and every day. States she has an appointment with psych in February.  Tobacco Usage?: No       Current Outpatient Medications:   •  carvedilol (COREG) 3.125 MG tablet, Take 1 tablet by mouth 2 (two) times a day., Disp: 180 tablet, Rfl: 1  •  esomeprazole (nexIUM) 40 MG capsule, Take 1 capsule by mouth Daily., Disp: 90 capsule, Rfl: 1  •  levothyroxine sodium (TIROSINT) 75 MCG capsule, Take 1 Capsule by mouth daily, Disp: 30 capsule, Rfl: 0  •  Ozempic, 0.25 or 0.5 MG/DOSE, 2 MG/1.5ML solution pen-injector, DIAL AND INJECT UNDER THE SKIN 0.25 MG WEEKLY FOR 4 WEEKS THEN DIAL AND INJECT 0.5 MG WEEKLY THEREAFTER, Disp: , Rfl:   •  Tranexamic Acid (Lysteda) 650 MG tablet, 2 tablets by mouth 3 times daily for 5 days, Disp: 30 tablet, Rfl: 12    Current Facility-Administered Medications:   •  lidocaine (XYLOCAINE) 1 % injection 10 mL, 10 mL, Injection, Once, Kendal Mello MD     Patient denies the need for medication refills today.    OB History        3    Para   3    Term   2       1    AB   0    Living   3       SAB        IAB        Ectopic        Molar        Multiple        Live Births                    Past Medical History:   Diagnosis Date   • Anxiety    • Depression    • Disease of thyroid gland    • GERD (gastroesophageal reflux disease)    • Kidney stone    • Sepsis (HCC)         Past Surgical History:   Procedure Laterality Date   • CHOLECYSTECTOMY     •  "CYSTOSCOPY BLADDER STONE LITHOTRIPSY     • [facility]         Health Maintenance   Topic Date Due   • TDAP/TD VACCINES (1 - Tdap) Never done   • HEPATITIS C SCREENING  Never done   • ANNUAL PHYSICAL  Never done   • Annual Gynecologic Pelvic and Breast Exam  01/27/2022   • COVID-19 Vaccine (4 - Booster for Pfizer series) 02/04/2022   • INFLUENZA VACCINE  Never done   • PAP SMEAR  02/11/2024   • Pneumococcal Vaccine 0-64  Aged Out       The additional following portions of the patient's history were reviewed and updated as appropriate: allergies, current medications, past family history, past medical history, past social history, past surgical history and problem list.    Review of Systems   Constitutional: Negative.    HENT: Negative.    Eyes: Negative.    Respiratory: Negative.    Cardiovascular: Negative.    Gastrointestinal: Negative.    Endocrine: Negative.    Genitourinary: Positive for menstrual problem.   Musculoskeletal: Negative.    Skin: Negative.    Allergic/Immunologic: Negative.    Neurological: Negative.    Hematological: Negative.    Psychiatric/Behavioral: Positive for depressed mood. The patient is nervous/anxious.          I have reviewed and agree with the HPI, ROS, and historical information as entered above. Lorna Wright MD      Objective   /90   Ht 170.2 cm (67\")   Wt 118 kg (260 lb 12.8 oz)   LMP 01/09/2023 (Approximate)   BMI 40.85 kg/m²     Physical Exam  Vitals and nursing note reviewed. Exam conducted with a chaperone present.   Constitutional:       Appearance: She is well-developed.   HENT:      Head: Normocephalic and atraumatic.   Neck:      Thyroid: No thyroid mass or thyromegaly.   Cardiovascular:      Rate and Rhythm: Normal rate and regular rhythm.      Heart sounds: No murmur heard.  Pulmonary:      Effort: Pulmonary effort is normal. No retractions.      Breath sounds: Normal breath sounds. No wheezing, rhonchi or rales.   Chest:      Chest wall: No mass or " tenderness.   Breasts:     Right: Normal. No mass, nipple discharge, skin change or tenderness.      Left: Normal. No mass, nipple discharge, skin change or tenderness.   Abdominal:      General: Bowel sounds are normal.      Palpations: Abdomen is soft. Abdomen is not rigid. There is no mass.      Tenderness: There is no abdominal tenderness. There is no guarding.      Hernia: No hernia is present. There is no hernia in the left inguinal area or right inguinal area.   Genitourinary:     General: Normal vulva.      Exam position: Lithotomy position.      Pubic Area: No rash.       Labia:         Right: No rash, tenderness or lesion.         Left: No rash, tenderness or lesion.       Urethra: No urethral pain or urethral swelling.      Vagina: Normal. No vaginal discharge or lesions.      Cervix: No cervical motion tenderness, discharge, lesion or cervical bleeding.      Uterus: Normal. Not enlarged, not fixed and not tender.       Adnexa:         Right: No mass, tenderness or fullness.          Left: No mass, tenderness or fullness.        Rectum: No external hemorrhoid.   Musculoskeletal:      Cervical back: Normal range of motion. No muscular tenderness.   Neurological:      Mental Status: She is alert and oriented to person, place, and time.   Psychiatric:         Behavior: Behavior normal.            Assessment and Plan    Problem List Items Addressed This Visit        Genitourinary and Reproductive     Menorrhagia with regular cycle    Overview     1/23/2023-ultrasound within normal limits.  CBC, TSH drawn.  Ultrasound demonstrated:Anteverted uterus uniform endometrium measuring 11.7 mm.  No fibroids or polyps seen.  Small incidental left ovarian follicle noted.  No free fluid noted.  Discussed treatment options with patient including but not limited to OCPs, POPs, NuvaRing, IUD, Lysteda, ablation, hysterectomy.  Patient would like to try Lysteda and schedule for Mirena.         Relevant Medications     Tranexamic Acid (Lysteda) 650 MG tablet    Other Relevant Orders    CBC & Differential    TSH    Mammo Screening Digital Tomosynthesis Bilateral With CAD   Other Visit Diagnoses     Women's annual routine gynecological examination    -  Primary    Breast cancer screening by mammogram              1. GYN annual well woman exam.   2. Pap guidelines reviewed.  3. Reviewed monthly self breast exams.  Instructed to call with lumps, pain, or breast discharge.    4. Ordered Mammogram today  5. Recommended use of Vitamin D replacement and getting adequate calcium in her diet. (1500mg)  6. Reviewed BMI and weight loss as preventative health measures.   7. Reviewed exercise as a preventative health measures.   8. Reccommended Flu Vaccine in Fall of each year.  9. Symptoms of menopausal transition reviewed with patient.   10. RTC in 1 year or PRN with problems.  11. Return if symptoms worsen or fail to improve, for Precert Mirena and place with next menses.     Lorna Wright MD  01/23/2023

## 2023-02-06 ENCOUNTER — TELEPHONE (OUTPATIENT)
Dept: OBSTETRICS AND GYNECOLOGY | Facility: CLINIC | Age: 43
End: 2023-02-06

## 2023-02-06 NOTE — TELEPHONE ENCOUNTER
PROVIDER: DR. DANIELS    Caller: Brandy Garcia    Relationship to patient: Self    Best call back number: [phone]    Chief complaint: SHE IS ONLY OFF MARCH 7 OF THAT WEEK - CALLER THOUGHT HER APPT WAS FOR 2-6 SAYS THAT'S WHEN SHE WAS TOLD IT WAS, AS SHE IS ON HER CYCLE.  SHE STATED SHE WOULD HAVE NEVER SCHEDULED FOR 3-6 AS SHE IS WORKING THAT DAY.  WANTS TO KNOW IF POSS MARCH 7 SHE COULD BE SCHEDULED (IF HER CYCLE STARTS THEN) OR CAN ARRANGE TO CALL WHEN CYCLE STARTS    Type of visit: IUD INSERTION    Requested date: 3-7    If rescheduling, when is the original appointment: 3-6    Additional notes: CAN LVM IF NA        ”

## 2023-03-07 ENCOUNTER — OFFICE VISIT (OUTPATIENT)
Dept: OBSTETRICS AND GYNECOLOGY | Facility: CLINIC | Age: 43
End: 2023-03-07
Payer: COMMERCIAL

## 2023-03-07 VITALS
DIASTOLIC BLOOD PRESSURE: 90 MMHG | HEIGHT: 67 IN | BODY MASS INDEX: 39.39 KG/M2 | SYSTOLIC BLOOD PRESSURE: 120 MMHG | WEIGHT: 251 LBS

## 2023-03-07 DIAGNOSIS — Z97.5 IUD (INTRAUTERINE DEVICE) IN PLACE: ICD-10-CM

## 2023-03-07 DIAGNOSIS — N92.0 MENORRHAGIA WITH REGULAR CYCLE: Primary | ICD-10-CM

## 2023-03-07 DIAGNOSIS — Z30.430 ENCOUNTER FOR IUD INSERTION: ICD-10-CM

## 2023-03-07 PROCEDURE — 58300 INSERT INTRAUTERINE DEVICE: CPT | Performed by: OBSTETRICS & GYNECOLOGY

## 2023-03-07 RX ORDER — LEVOTHYROXINE SODIUM 88 UG/1
TABLET ORAL
COMMUNITY
Start: 2023-03-06

## 2023-03-07 NOTE — PROGRESS NOTES
Gynecologic Procedure Note        Procedure: IUD Insertion     IUD insertion    Date/Time: 3/7/2023 10:04 AM  Performed by: Lorna Wright MD  Authorized by: Lorna Wright MD   Preparation: Patient was prepped and draped in the usual sterile fashion.  Local anesthesia used: no    Anesthesia:  Local anesthesia used: no    Sedation:  Patient sedated: no    Patient tolerance: patient tolerated the procedure well with no immediate complications          Pre procedure indication 1) Desires Mirena  Post procedure indication 1) Desires Mirena    NDC: Mirena 49606-721-29  Lot: [id number]  Exp Date: 03/2025  BH device    The risks, benefits, and alternatives to Mirena were explained at length with the patient. All her questions were answered and consents were signed.  Her LMP was 3-4-2023 .  Urine Pregnancy Test was Negative.  Patient does not have an allergy to betadine or shellfish.    The patient was placed in a dorsal lithotomy position on the examining table in [facility]. A bimanual exam confirmed the uterus was midposition. A speculum was inserted into the vagina and the cervix was brought into view.  The cervix was prepped with Betadine. The anterior lip of the cervix was then grasped with a single-tooth tenaculum. The endometrial cavity was then sounded to 9 centimeters. The sealed Mirena package was opened and the IUD was removed in a sterile fashion.    The upper edge of the depth setting the flange was set at the uterine sound measurement. The  was then carefully advanced to the cervical canal into the uterus to the level of the fundus.  The slider was then retracted about 1 cm and deployed the device. The device was then gently advanced to the fundus. The IUD was then released by pulling the slider down all the way. The  was removed carefully from the uterus. The threads were then cut leaving 2-3 cm visible outside of the cervix.  The single-tooth tenaculum was removed from the  anterior lip. Good hemostasis was noted.   All other instruments were removed from the vagina.       The patient tolerated the procedure well with a mild amount of discomfort.  She was monitored for 10 minutes prior to discharge.      There were no complications.    The patient was counseled about the need to return in 4 weeks for IUD check.     She was counseled about the need to use a backup method of contraception such as condoms until her post insertion exam was performed. The patient verbalized understanding when the IUD will need to be removed/replaced. Written information was given to the patient.  The patient is counseled to contact us if she has any significant or increasing bleeding, pain, fever, chills, or other concerns. She is instructed to see a doctor right away if she believes that she may be pregnant at any time with the IUD in place.    Lorna Wright MD  03/07/2023

## 2023-05-22 ENCOUNTER — TELEPHONE (OUTPATIENT)
Dept: OBSTETRICS AND GYNECOLOGY | Facility: CLINIC | Age: 43
End: 2023-05-22
Payer: COMMERCIAL

## 2023-05-22 DIAGNOSIS — R92.8 ABNORMAL MAMMOGRAM OF LEFT BREAST: Primary | ICD-10-CM

## 2023-05-22 NOTE — TELEPHONE ENCOUNTER
Mammogram referral sent back: 5/22/23 PER RADIOLOGIST READ ON 3/30/22 NEEDS A 6 MONTH DIAGNOSTIC FOLLOW UP OF LEFT THAT HAS NOT BEEN PREFORMED YET. PLEASE ENTER ORDER FOR EITHER OR DIAGNOSTIC BILATERAL OR DIAGNOSTIC LEFT TO BE PREFORMED BEFORE PT CAN GO BACK TO SCREENING MAMMOGRAM

## 2023-06-02 ENCOUNTER — TELEPHONE (OUTPATIENT)
Dept: OBSTETRICS AND GYNECOLOGY | Facility: CLINIC | Age: 43
End: 2023-06-02

## 2023-06-02 NOTE — TELEPHONE ENCOUNTER
Informed pt BB placed mammogram order because it looks like she was on call when mammogram called for an order, pt HEMA. Pt states she will call mammogram to schedule.

## 2023-06-02 NOTE — TELEPHONE ENCOUNTER
Per mammogram:    6/1/23: pt stated they have never been seen by chris and would like to hear from monie eubanks

## 2024-05-29 ENCOUNTER — OFFICE VISIT (OUTPATIENT)
Dept: OBSTETRICS AND GYNECOLOGY | Facility: CLINIC | Age: 44
End: 2024-05-29
Payer: COMMERCIAL

## 2024-05-29 VITALS
BODY MASS INDEX: 35.12 KG/M2 | WEIGHT: 223.8 LBS | DIASTOLIC BLOOD PRESSURE: 92 MMHG | SYSTOLIC BLOOD PRESSURE: 138 MMHG | HEIGHT: 67 IN

## 2024-05-29 DIAGNOSIS — Z30.432 ENCOUNTER FOR IUD REMOVAL: Primary | ICD-10-CM

## 2024-05-29 NOTE — PROGRESS NOTES
"     Gynecologic Procedure Note        Procedure: IUD Removal     Procedures    Pre procedure indication     1) Desires Removal of IUD    Post procedure indication   1) Desires Removal of IUD    /92 (BP Location: Right arm, Patient Position: Sitting, Cuff Size: Adult)   Ht 170.2 cm (67.01\")   Wt 102 kg (223 lb 12.8 oz)   LMP 05/03/2024 (Approximate)   BMI 35.04 kg/m²       The patient presents today for removal of her IUD.  She requests removal of her IUD due to  increased bleeding . This is her third IUD.  She states she did not bleed with the first 2 IUDs like she is with this 1. Patient reports every day she is bleeding during intercourse and bowel movements. She plans to use vasectomy  for contraception. All her questions were answered and consents were signed.    Time out: immediate members of the procedure team and patient agree to the following: correct patient, correct site, correct procedure to be performed. SHYLA Sorensen        The patient was placed in a dorsal lithotomy position on the examining table in [facility].  A speculum was inserted into the vagina and the cervix was brought into view.  An IUD string was visualized.  The string was then grasped and removed intact with gentle traction.  There was a Minimal amount of bleeding and cramping.    All  instruments were removed from the vagina.       The patient tolerated the procedure very well with a mild amount of discomfort.  She was monitored for 5 minutes prior to discharge.      There were no complications.    The patient was counseled on other options for birth control and options for management of irregular bleeding with IUD use.  She declined.  She plans to use vasectomy for contraception.  She plans to discuss ablation for heavy menstrual cycles with Dr. Wright.  Medical options offered.  Patient declined.    Problem List Items Addressed This Visit    None  Visit Diagnoses       Encounter for IUD removal    -  Primary      "         Paola Holman, APRN  05/29/2024

## 2025-02-28 ENCOUNTER — OFFICE VISIT (OUTPATIENT)
Dept: OBSTETRICS AND GYNECOLOGY | Facility: CLINIC | Age: 45
End: 2025-02-28
Payer: COMMERCIAL

## 2025-02-28 VITALS
BODY MASS INDEX: 37.01 KG/M2 | DIASTOLIC BLOOD PRESSURE: 86 MMHG | SYSTOLIC BLOOD PRESSURE: 130 MMHG | HEIGHT: 67 IN | WEIGHT: 235.8 LBS

## 2025-02-28 DIAGNOSIS — N72 CERVICITIS AND ENDOCERVICITIS: ICD-10-CM

## 2025-02-28 DIAGNOSIS — R92.8 ABNORMAL MAMMOGRAM: ICD-10-CM

## 2025-02-28 DIAGNOSIS — R53.82 CHRONIC FATIGUE: ICD-10-CM

## 2025-02-28 DIAGNOSIS — N92.1 MENORRHAGIA WITH IRREGULAR CYCLE: ICD-10-CM

## 2025-02-28 DIAGNOSIS — Z01.419 WOMEN'S ANNUAL ROUTINE GYNECOLOGICAL EXAMINATION: Primary | ICD-10-CM

## 2025-02-28 RX ORDER — DOXYCYCLINE 100 MG/1
100 CAPSULE ORAL 2 TIMES DAILY
Qty: 14 CAPSULE | Refills: 0 | Status: SHIPPED | OUTPATIENT
Start: 2025-02-28 | End: 2025-03-07

## 2025-02-28 RX ORDER — BUSPIRONE HYDROCHLORIDE 5 MG/1
1 TABLET ORAL 3 TIMES DAILY
COMMUNITY
Start: 2025-02-07

## 2025-02-28 RX ORDER — METRONIDAZOLE 500 MG/1
500 TABLET ORAL 2 TIMES DAILY
Qty: 14 TABLET | Refills: 0 | Status: SHIPPED | OUTPATIENT
Start: 2025-02-28 | End: 2025-03-07

## 2025-02-28 RX ORDER — FLUCONAZOLE 150 MG/1
TABLET ORAL
Qty: 2 TABLET | Refills: 1 | Status: SHIPPED | OUTPATIENT
Start: 2025-02-28

## 2025-02-28 RX ORDER — HYDROCHLOROTHIAZIDE 12.5 MG/1
CAPSULE ORAL
COMMUNITY
Start: 2025-02-22

## 2025-02-28 NOTE — PROGRESS NOTES
Gynecologic Annual Exam Note          GYN Annual Exam     Gynecologic Exam (annual)        Subjective     HPI  Brandy Garcia is a 44 y.o. female, , who presents for annual well woman exam as an established patient of practice who is new to me. There were no changes to her medical or surgical history since her last visit.  Patient's last menstrual period was 2025 (approximate).  Her periods occur every 28 days, lasting 7-10 days.  The flow is light the first 3 days, then heavy the next 3-4 days, saturating a pad and tampon every 2 hours, then moderate to light. She reports dysmenorrhea is mild occurring first 1-2 days of flow. Patient also reports spotting in between periods. Marital Status: . She is sexually active. She has not had new partners. STD testing recommendations have been explained to the patient and she declines STD testing.    The patient would like to discuss the following complaints today: spotting between periods and PMS the week before periods. Feels extremely valentino for 3-4 days before her period. When she was on the Mounjaro her bleeding wasn't as long/heavy. Now she's having breakthrough bleeding, postcoital bleeding, and bleeding when she strains with BM's.         Additional OB/GYN History   contraceptive methods: Vasectomy   Desires to: continue contraception  History of migraines: no    Last Pap : 22. Result: negative. HPV: negative.   Last Completed Pap Smear       This patient has no relevant Health Maintenance data.          History of abnormal Pap smear: yes - LEEP in   Family history of uterine, colon, breast, or ovarian cancer: no  Performs monthly Self-Breast Exam: yes  Last mammogram: 22 and Diagnostic on left breast with biopsy on 22. Done at . There is a copy in the chart.    Last Completed Mammogram       This patient has no relevant Health Maintenance data.            Colonoscopy: has never had a colonoscopy or  cologuard  Exercises Regularly: yes, walking  Feelings of Anxiety or Depression: yes - anxiety  Tobacco Usage?: No       Current Outpatient Medications:     busPIRone (BUSPAR) 5 MG tablet, Take 1 tablet by mouth 3 times a day., Disp: , Rfl:     esomeprazole (nexIUM) 40 MG capsule, Take 1 capsule by mouth Daily., Disp: 90 capsule, Rfl: 1    hydroCHLOROthiazide (MICROZIDE) 12.5 MG capsule, , Disp: , Rfl:     levothyroxine (SYNTHROID, LEVOTHROID) 88 MCG tablet, , Disp: , Rfl:     Current Facility-Administered Medications:     lidocaine (XYLOCAINE) 1 % injection 10 mL, 10 mL, Injection, Once, Kendal Mello MD     Patient denies the need for medication refills today.    OB History          3    Para   3    Term   2       1    AB   0    Living   3         SAB        IAB        Ectopic        Molar        Multiple        Live Births                    Past Medical History:   Diagnosis Date    Abnormal Pap smear of cervix     Anxiety     Cervical dysplasia     Depression     Disease of thyroid gland     GERD (gastroesophageal reflux disease)     Gestational diabetes     Gestational hypertension     Kidney stone     Preeclampsia     Sepsis         Past Surgical History:   Procedure Laterality Date    CHOLECYSTECTOMY      CYSTOSCOPY BLADDER STONE LITHOTRIPSY      LEEP         Health Maintenance   Topic Date Due    ANNUAL PHYSICAL  Never done    BMI FOLLOWUP  2020    Annual Gynecologic Pelvic and Breast Exam  2024    PAP SMEAR  2024    MAMMOGRAM  2024    INFLUENZA VACCINE  2024    COVID-19 Vaccine (2024- season) 2024    TDAP/TD VACCINES (2 - Td or Tdap) 2033    HEPATITIS C SCREENING  Completed    Pneumococcal Vaccine 0-49  Aged Out       The additional following portions of the patient's history were reviewed and updated as appropriate: allergies, current medications, past family history, past medical history, past social history, past surgical history, and  "problem list.    Review of Systems   Constitutional: Negative.    HENT: Negative.     Eyes: Negative.    Respiratory: Negative.     Cardiovascular: Negative.    Gastrointestinal: Negative.    Endocrine: Negative.    Genitourinary:  Positive for menstrual problem.   Musculoskeletal: Negative.    Skin: Negative.    Allergic/Immunologic: Negative.    Neurological: Negative.    Hematological: Negative.    Psychiatric/Behavioral:  Positive for dysphoric mood.          I have reviewed and agree with the HPI, ROS, and historical information as entered above. Vesna Harris APRN          Objective   /86   Ht 170.2 cm (67\")   Wt 107 kg (235 lb 12.8 oz)   LMP 02/14/2025 (Approximate)   BMI 36.93 kg/m²     Physical Exam  Vitals and nursing note reviewed. Exam conducted with a chaperone present.   Constitutional:       General: She is not in acute distress.     Appearance: Normal appearance. She is well-developed. She is not ill-appearing.   Neck:      Thyroid: No thyroid mass or thyromegaly.   Pulmonary:      Effort: Pulmonary effort is normal. No respiratory distress or retractions.   Chest:      Chest wall: No mass.   Breasts:     Right: Normal. No mass, nipple discharge, skin change or tenderness.      Left: Normal. No mass, nipple discharge, skin change or tenderness.      Comments: Fibrocystic tissue present bilaterally    Abdominal:      General: There is no distension.      Palpations: Abdomen is soft. Abdomen is not rigid. There is no mass.      Tenderness: There is no abdominal tenderness. There is no guarding or rebound.      Hernia: No hernia is present.   Genitourinary:     General: Normal vulva.      Exam position: Lithotomy position.      Labia:         Right: No rash, tenderness or lesion.         Left: No rash, tenderness or lesion.       Vagina: Normal. No vaginal discharge or lesions.      Cervix: Friability and eversion present. No cervical motion tenderness or discharge.      Uterus: Enlarged. Not " fixed and not tender.       Adnexa: Right adnexa normal and left adnexa normal.        Right: No mass or tenderness.          Left: No mass or tenderness.        Rectum: Normal. No external hemorrhoid.      Comments: Cervix quite friable, silver nitrate was necessary to stop the bleeding.   Uterus feels enlarged.   Musculoskeletal:      Cervical back: No muscular tenderness.   Skin:     General: Skin is warm and dry.   Neurological:      Mental Status: She is alert and oriented to person, place, and time.   Psychiatric:         Mood and Affect: Mood normal.         Behavior: Behavior normal.            Assessment and Plan    Problem List Items Addressed This Visit    None  Visit Diagnoses       Women's annual routine gynecological examination    -  Primary    Relevant Orders    LIQUID-BASED PAP SMEAR WITH HPV GENOTYPING REGARDLESS OF INTERPRETATION (JOHNNIE,COR,[facility]AD)    Menorrhagia with irregular cycle        Relevant Orders    TSH    T4, Free    DHEA-Sulfate    Estradiol    Follicle Stimulating Hormone    Progesterone    Luteinizing Hormone    CBC (No Diff)    Comprehensive Metabolic Panel    Hemoglobin A1c    US Non-ob Transvaginal    Chronic fatigue        Relevant Orders    Vitamin D,25-Hydroxy    Cervicitis and endocervicitis                GYN annual well woman exam.   Pap guidelines reviewed. Obtained today, will add BV/Yeast testing as well.   Labs done today to check thyroid and evaluate for other causes of bleeding, mood changes, fatigue. Will obtain TVUS to assess for causes of heavy, prolonged menses with intermenstrual bleeding.   Will treat empirically for cervicitis with doxycycline and flagyl, will send diflucan as well.   Fibrocystic breast changes - Encouraged decreasing caffeine, supportive bra, low dose vitamin E supplementation.  Reviewed monthly self breast exams.  Instructed to call with lumps, pain, or breast discharge.    Ordered Mammogram today  Discussed importance of routine colon cancer  screening. Reviewed current guidelines. Recommended colonoscopy after age 45.  Return in about 2 weeks (around 3/14/2025) for Steven rosado.     Vesna Harris, SHYLA  02/28/2025

## 2025-03-01 LAB
25(OH)D3+25(OH)D2 SERPL-MCNC: 19.2 NG/ML (ref 30–100)
ALBUMIN SERPL-MCNC: 4.2 G/DL (ref 3.9–4.9)
ALP SERPL-CCNC: 93 IU/L (ref 44–121)
ALT SERPL-CCNC: 22 IU/L (ref 0–32)
AST SERPL-CCNC: 17 IU/L (ref 0–40)
BILIRUB SERPL-MCNC: 0.4 MG/DL (ref 0–1.2)
BUN SERPL-MCNC: 11 MG/DL (ref 6–24)
BUN/CREAT SERPL: 14 (ref 9–23)
CALCIUM SERPL-MCNC: 9.5 MG/DL (ref 8.7–10.2)
CHLORIDE SERPL-SCNC: 99 MMOL/L (ref 96–106)
CO2 SERPL-SCNC: 27 MMOL/L (ref 20–29)
CREAT SERPL-MCNC: 0.78 MG/DL (ref 0.57–1)
DHEA-S SERPL-MCNC: 221 UG/DL (ref 57.3–279.2)
EGFRCR SERPLBLD CKD-EPI 2021: 97 ML/MIN/1.73
ERYTHROCYTE [DISTWIDTH] IN BLOOD BY AUTOMATED COUNT: 14.3 % (ref 11.7–15.4)
ESTRADIOL SERPL-MCNC: 146 PG/ML
FSH SERPL-ACNC: 12.4 MIU/ML
GLOBULIN SER CALC-MCNC: 2.9 G/DL (ref 1.5–4.5)
GLUCOSE SERPL-MCNC: 90 MG/DL (ref 70–99)
HBA1C MFR BLD: 6 % (ref 4.8–5.6)
HCT VFR BLD AUTO: 34.2 % (ref 34–46.6)
HGB BLD-MCNC: 10.4 G/DL (ref 11.1–15.9)
LH SERPL-ACNC: 65.2 MIU/ML
MCH RBC QN AUTO: 23.5 PG (ref 26.6–33)
MCHC RBC AUTO-ENTMCNC: 30.4 G/DL (ref 31.5–35.7)
MCV RBC AUTO: 77 FL (ref 79–97)
PLATELET # BLD AUTO: 545 X10E3/UL (ref 150–450)
POTASSIUM SERPL-SCNC: 3.6 MMOL/L (ref 3.5–5.2)
PROGEST SERPL-MCNC: 0.5 NG/ML
PROT SERPL-MCNC: 7.1 G/DL (ref 6–8.5)
RBC # BLD AUTO: 4.42 X10E6/UL (ref 3.77–5.28)
SODIUM SERPL-SCNC: 139 MMOL/L (ref 134–144)
T4 FREE SERPL-MCNC: 1.37 NG/DL (ref 0.82–1.77)
TSH SERPL DL<=0.005 MIU/L-ACNC: 3.91 UIU/ML (ref 0.45–4.5)
WBC # BLD AUTO: 8.2 X10E3/UL (ref 3.4–10.8)

## 2025-03-06 DIAGNOSIS — E55.9 VITAMIN D DEFICIENCY: Primary | ICD-10-CM

## 2025-03-06 LAB — REF LAB TEST METHOD: NORMAL

## 2025-03-06 RX ORDER — ERGOCALCIFEROL 1.25 MG/1
50000 CAPSULE ORAL WEEKLY
Qty: 24 CAPSULE | Refills: 1 | Status: SHIPPED | OUTPATIENT
Start: 2025-03-06